# Patient Record
Sex: MALE | Race: WHITE | NOT HISPANIC OR LATINO | Employment: OTHER | URBAN - METROPOLITAN AREA
[De-identification: names, ages, dates, MRNs, and addresses within clinical notes are randomized per-mention and may not be internally consistent; named-entity substitution may affect disease eponyms.]

---

## 2021-03-26 ENCOUNTER — APPOINTMENT (OUTPATIENT)
Dept: GENERAL RADIOLOGY | Facility: HOSPITAL | Age: 81
End: 2021-03-26

## 2021-03-26 ENCOUNTER — APPOINTMENT (OUTPATIENT)
Dept: CT IMAGING | Facility: HOSPITAL | Age: 81
End: 2021-03-26

## 2021-03-26 ENCOUNTER — APPOINTMENT (OUTPATIENT)
Dept: MRI IMAGING | Facility: HOSPITAL | Age: 81
End: 2021-03-26

## 2021-03-26 ENCOUNTER — HOSPITAL ENCOUNTER (INPATIENT)
Facility: HOSPITAL | Age: 81
LOS: 11 days | Discharge: NURSING, REHAB OR LONG-TERM CARE | End: 2021-04-08
Attending: EMERGENCY MEDICINE | Admitting: INTERNAL MEDICINE

## 2021-03-26 DIAGNOSIS — R47.01 APHASIA: ICD-10-CM

## 2021-03-26 DIAGNOSIS — R47.1 DYSARTHRIA: ICD-10-CM

## 2021-03-26 DIAGNOSIS — G30.9 ALZHEIMER'S DEMENTIA WITHOUT BEHAVIORAL DISTURBANCE, UNSPECIFIED TIMING OF DEMENTIA ONSET: Primary | ICD-10-CM

## 2021-03-26 DIAGNOSIS — F02.80 ALZHEIMER'S DEMENTIA WITHOUT BEHAVIORAL DISTURBANCE, UNSPECIFIED TIMING OF DEMENTIA ONSET: Primary | ICD-10-CM

## 2021-03-26 DIAGNOSIS — R13.12 OROPHARYNGEAL DYSPHAGIA: ICD-10-CM

## 2021-03-26 DIAGNOSIS — R53.1 ACUTE LEFT-SIDED WEAKNESS: ICD-10-CM

## 2021-03-26 PROBLEM — E03.9 HYPOTHYROIDISM (ACQUIRED): Status: ACTIVE | Noted: 2021-03-26

## 2021-03-26 PROBLEM — K21.9 GERD WITHOUT ESOPHAGITIS: Status: ACTIVE | Noted: 2021-03-26

## 2021-03-26 PROBLEM — F03.90 DEMENTIA WITHOUT BEHAVIORAL DISTURBANCE (HCC): Status: ACTIVE | Noted: 2021-03-26

## 2021-03-26 LAB
ALT SERPL W P-5'-P-CCNC: 12 U/L (ref 1–41)
APTT PPP: 31.1 SECONDS (ref 24–37)
AST SERPL-CCNC: 20 U/L (ref 1–40)
BASE EXCESS BLDA CALC-SCNC: 6 MMOL/L (ref -5–5)
BASOPHILS # BLD AUTO: 0.04 10*3/MM3 (ref 0–0.2)
BASOPHILS NFR BLD AUTO: 0.5 % (ref 0–1.5)
CA-I BLDA-SCNC: 1.2 MMOL/L (ref 1.2–1.32)
CO2 BLDA-SCNC: 32 MMOL/L (ref 24–29)
CREAT BLDA-MCNC: 1.3 MG/DL (ref 0.6–1.3)
DEPRECATED RDW RBC AUTO: 44 FL (ref 37–54)
EOSINOPHIL # BLD AUTO: 0.09 10*3/MM3 (ref 0–0.4)
EOSINOPHIL NFR BLD AUTO: 1.1 % (ref 0.3–6.2)
ERYTHROCYTE [DISTWIDTH] IN BLOOD BY AUTOMATED COUNT: 13.2 % (ref 12.3–15.4)
FLUAV RNA RESP QL NAA+PROBE: NOT DETECTED
FLUBV RNA RESP QL NAA+PROBE: NOT DETECTED
GLUCOSE BLDC GLUCOMTR-MCNC: 113 MG/DL (ref 70–130)
HCO3 BLDA-SCNC: 30.7 MMOL/L (ref 22–26)
HCT VFR BLD AUTO: 45.2 % (ref 37.5–51)
HCT VFR BLDA CALC: 46 % (ref 38–51)
HGB BLD-MCNC: 14.4 G/DL (ref 13–17.7)
HGB BLDA-MCNC: 15.6 G/DL (ref 12–17)
HOLD SPECIMEN: NORMAL
IMM GRANULOCYTES # BLD AUTO: 0.02 10*3/MM3 (ref 0–0.05)
IMM GRANULOCYTES NFR BLD AUTO: 0.2 % (ref 0–0.5)
LYMPHOCYTES # BLD AUTO: 1.83 10*3/MM3 (ref 0.7–3.1)
LYMPHOCYTES NFR BLD AUTO: 22.6 % (ref 19.6–45.3)
MCH RBC QN AUTO: 28.9 PG (ref 26.6–33)
MCHC RBC AUTO-ENTMCNC: 31.9 G/DL (ref 31.5–35.7)
MCV RBC AUTO: 90.8 FL (ref 79–97)
MONOCYTES # BLD AUTO: 0.68 10*3/MM3 (ref 0.1–0.9)
MONOCYTES NFR BLD AUTO: 8.4 % (ref 5–12)
NEUTROPHILS NFR BLD AUTO: 5.45 10*3/MM3 (ref 1.7–7)
NEUTROPHILS NFR BLD AUTO: 67.2 % (ref 42.7–76)
NRBC BLD AUTO-RTO: 0 /100 WBC (ref 0–0.2)
PCO2 BLDA: 49.8 MM HG (ref 35–45)
PH BLDA: 7.4 PH UNITS (ref 7.35–7.6)
PLATELET # BLD AUTO: 219 10*3/MM3 (ref 140–450)
PMV BLD AUTO: 10.1 FL (ref 6–12)
PO2 BLDA: 13 MMHG (ref 80–105)
POTASSIUM BLDA-SCNC: 3.9 MMOL/L (ref 3.5–4.9)
QT INTERVAL: 432 MS
QTC INTERVAL: 466 MS
RBC # BLD AUTO: 4.98 10*6/MM3 (ref 4.14–5.8)
SAO2 % BLDA: 15 % (ref 95–98)
SARS-COV-2 RNA RESP QL NAA+PROBE: NOT DETECTED
SODIUM BLD-SCNC: 145 MMOL/L (ref 138–146)
TROPONIN T SERPL-MCNC: <0.01 NG/ML (ref 0–0.03)
WBC # BLD AUTO: 8.11 10*3/MM3 (ref 3.4–10.8)
WHOLE BLOOD HOLD SPECIMEN: NORMAL
WHOLE BLOOD HOLD SPECIMEN: NORMAL

## 2021-03-26 PROCEDURE — 70450 CT HEAD/BRAIN W/O DYE: CPT

## 2021-03-26 PROCEDURE — 99223 1ST HOSP IP/OBS HIGH 75: CPT | Performed by: NURSE PRACTITIONER

## 2021-03-26 PROCEDURE — 82947 ASSAY GLUCOSE BLOOD QUANT: CPT

## 2021-03-26 PROCEDURE — 70496 CT ANGIOGRAPHY HEAD: CPT

## 2021-03-26 PROCEDURE — 0042T HC CT CEREBRAL PERFUSION W/WO CONTRAST: CPT

## 2021-03-26 PROCEDURE — 82330 ASSAY OF CALCIUM: CPT

## 2021-03-26 PROCEDURE — 84450 TRANSFERASE (AST) (SGOT): CPT | Performed by: EMERGENCY MEDICINE

## 2021-03-26 PROCEDURE — 85730 THROMBOPLASTIN TIME PARTIAL: CPT | Performed by: EMERGENCY MEDICINE

## 2021-03-26 PROCEDURE — G0378 HOSPITAL OBSERVATION PER HR: HCPCS

## 2021-03-26 PROCEDURE — 85025 COMPLETE CBC W/AUTO DIFF WBC: CPT | Performed by: EMERGENCY MEDICINE

## 2021-03-26 PROCEDURE — 84460 ALANINE AMINO (ALT) (SGPT): CPT | Performed by: EMERGENCY MEDICINE

## 2021-03-26 PROCEDURE — 70498 CT ANGIOGRAPHY NECK: CPT

## 2021-03-26 PROCEDURE — 84484 ASSAY OF TROPONIN QUANT: CPT | Performed by: EMERGENCY MEDICINE

## 2021-03-26 PROCEDURE — 87636 SARSCOV2 & INF A&B AMP PRB: CPT | Performed by: INTERNAL MEDICINE

## 2021-03-26 PROCEDURE — 82803 BLOOD GASES ANY COMBINATION: CPT

## 2021-03-26 PROCEDURE — 70551 MRI BRAIN STEM W/O DYE: CPT

## 2021-03-26 PROCEDURE — 84132 ASSAY OF SERUM POTASSIUM: CPT

## 2021-03-26 PROCEDURE — 85014 HEMATOCRIT: CPT

## 2021-03-26 PROCEDURE — 71045 X-RAY EXAM CHEST 1 VIEW: CPT

## 2021-03-26 PROCEDURE — 99285 EMERGENCY DEPT VISIT HI MDM: CPT

## 2021-03-26 PROCEDURE — 99220 PR INITIAL OBSERVATION CARE/DAY 70 MINUTES: CPT | Performed by: INTERNAL MEDICINE

## 2021-03-26 PROCEDURE — 84295 ASSAY OF SERUM SODIUM: CPT

## 2021-03-26 PROCEDURE — 82565 ASSAY OF CREATININE: CPT

## 2021-03-26 PROCEDURE — 93005 ELECTROCARDIOGRAM TRACING: CPT | Performed by: EMERGENCY MEDICINE

## 2021-03-26 PROCEDURE — 0 IOPAMIDOL PER 1 ML: Performed by: EMERGENCY MEDICINE

## 2021-03-26 RX ORDER — ASPIRIN 300 MG/1
300 SUPPOSITORY RECTAL DAILY
Status: DISCONTINUED | OUTPATIENT
Start: 2021-03-26 | End: 2021-04-08 | Stop reason: HOSPADM

## 2021-03-26 RX ORDER — SODIUM CHLORIDE 0.9 % (FLUSH) 0.9 %
10 SYRINGE (ML) INJECTION EVERY 12 HOURS SCHEDULED
Status: DISCONTINUED | OUTPATIENT
Start: 2021-03-26 | End: 2021-04-08 | Stop reason: HOSPADM

## 2021-03-26 RX ORDER — LEVOTHYROXINE SODIUM 0.07 MG/1
75 TABLET ORAL DAILY
COMMUNITY

## 2021-03-26 RX ORDER — BISACODYL 5 MG/1
10 TABLET, DELAYED RELEASE ORAL DAILY PRN
COMMUNITY

## 2021-03-26 RX ORDER — MELATONIN
1000 DAILY
COMMUNITY

## 2021-03-26 RX ORDER — CLOPIDOGREL BISULFATE 75 MG/1
75 TABLET ORAL DAILY
Status: DISCONTINUED | OUTPATIENT
Start: 2021-03-26 | End: 2021-04-08 | Stop reason: HOSPADM

## 2021-03-26 RX ORDER — CLONAZEPAM 0.5 MG/1
0.5 TABLET ORAL 2 TIMES DAILY PRN
Status: ON HOLD | COMMUNITY
End: 2021-04-08 | Stop reason: SDUPTHER

## 2021-03-26 RX ORDER — LEVOTHYROXINE SODIUM 0.07 MG/1
75 TABLET ORAL
Status: DISCONTINUED | OUTPATIENT
Start: 2021-03-27 | End: 2021-04-08 | Stop reason: HOSPADM

## 2021-03-26 RX ORDER — FLUDROCORTISONE ACETATE 0.1 MG/1
TABLET ORAL DAILY
COMMUNITY

## 2021-03-26 RX ORDER — PANTOPRAZOLE SODIUM 40 MG/1
40 TABLET, DELAYED RELEASE ORAL DAILY
COMMUNITY

## 2021-03-26 RX ORDER — CLONAZEPAM 1 MG/1
1 TABLET ORAL NIGHTLY
Status: DISPENSED | OUTPATIENT
Start: 2021-03-26 | End: 2021-04-02

## 2021-03-26 RX ORDER — ATORVASTATIN CALCIUM 80 MG/1
80 TABLET, FILM COATED ORAL DAILY
COMMUNITY

## 2021-03-26 RX ORDER — SODIUM CHLORIDE 0.9 % (FLUSH) 0.9 %
10 SYRINGE (ML) INJECTION AS NEEDED
Status: DISCONTINUED | OUTPATIENT
Start: 2021-03-26 | End: 2021-04-08 | Stop reason: HOSPADM

## 2021-03-26 RX ORDER — ASPIRIN 81 MG/1
81 TABLET ORAL DAILY
COMMUNITY
End: 2021-04-08 | Stop reason: HOSPADM

## 2021-03-26 RX ORDER — FLUDROCORTISONE ACETATE 0.1 MG/1
100 TABLET ORAL DAILY
Status: DISCONTINUED | OUTPATIENT
Start: 2021-03-27 | End: 2021-04-08 | Stop reason: HOSPADM

## 2021-03-26 RX ORDER — ASPIRIN 325 MG
325 TABLET ORAL DAILY
Status: DISCONTINUED | OUTPATIENT
Start: 2021-03-26 | End: 2021-04-08 | Stop reason: HOSPADM

## 2021-03-26 RX ORDER — LANOLIN ALCOHOL/MO/W.PET/CERES
1000 CREAM (GRAM) TOPICAL DAILY
COMMUNITY

## 2021-03-26 RX ORDER — CLONAZEPAM 1 MG/1
1 TABLET ORAL 2 TIMES DAILY PRN
COMMUNITY
End: 2021-04-08 | Stop reason: HOSPADM

## 2021-03-26 RX ORDER — CLOPIDOGREL BISULFATE 75 MG/1
75 TABLET ORAL DAILY
COMMUNITY

## 2021-03-26 RX ORDER — LORAZEPAM 2 MG/ML
0.25 INJECTION INTRAMUSCULAR NIGHTLY PRN
Status: COMPLETED | OUTPATIENT
Start: 2021-03-26 | End: 2021-03-27

## 2021-03-26 RX ORDER — ACETAMINOPHEN 325 MG/1
650 TABLET ORAL EVERY 6 HOURS PRN
COMMUNITY

## 2021-03-26 RX ORDER — CLONAZEPAM 0.5 MG/1
0.5 TABLET ORAL DAILY PRN
Status: DISCONTINUED | OUTPATIENT
Start: 2021-03-26 | End: 2021-04-08 | Stop reason: HOSPADM

## 2021-03-26 RX ORDER — PANTOPRAZOLE SODIUM 40 MG/1
40 TABLET, DELAYED RELEASE ORAL
Status: DISCONTINUED | OUTPATIENT
Start: 2021-03-27 | End: 2021-04-08 | Stop reason: HOSPADM

## 2021-03-26 RX ORDER — ATORVASTATIN CALCIUM 40 MG/1
80 TABLET, FILM COATED ORAL NIGHTLY
Status: DISCONTINUED | OUTPATIENT
Start: 2021-03-26 | End: 2021-04-08 | Stop reason: HOSPADM

## 2021-03-26 RX ADMIN — IOPAMIDOL 100 ML: 755 INJECTION, SOLUTION INTRAVENOUS at 15:00

## 2021-03-26 RX ADMIN — SODIUM CHLORIDE, PRESERVATIVE FREE 10 ML: 5 INJECTION INTRAVENOUS at 20:13

## 2021-03-27 ENCOUNTER — APPOINTMENT (OUTPATIENT)
Dept: CARDIOLOGY | Facility: HOSPITAL | Age: 81
End: 2021-03-27

## 2021-03-27 LAB
ANION GAP SERPL CALCULATED.3IONS-SCNC: 10 MMOL/L (ref 5–15)
BACTERIA UR QL AUTO: ABNORMAL /HPF
BILIRUB UR QL STRIP: NEGATIVE
BUN SERPL-MCNC: 11 MG/DL (ref 8–23)
BUN/CREAT SERPL: 8.9 (ref 7–25)
CALCIUM SPEC-SCNC: 8.5 MG/DL (ref 8.6–10.5)
CHLORIDE SERPL-SCNC: 106 MMOL/L (ref 98–107)
CHOLEST SERPL-MCNC: 142 MG/DL (ref 0–200)
CLARITY UR: CLEAR
CO2 SERPL-SCNC: 29 MMOL/L (ref 22–29)
COLOR UR: YELLOW
CREAT SERPL-MCNC: 1.24 MG/DL (ref 0.76–1.27)
DEPRECATED RDW RBC AUTO: 43.8 FL (ref 37–54)
ERYTHROCYTE [DISTWIDTH] IN BLOOD BY AUTOMATED COUNT: 13.1 % (ref 12.3–15.4)
GFR SERPL CREATININE-BSD FRML MDRD: 56 ML/MIN/1.73
GLUCOSE BLDC GLUCOMTR-MCNC: 120 MG/DL (ref 70–130)
GLUCOSE BLDC GLUCOMTR-MCNC: 94 MG/DL (ref 70–130)
GLUCOSE SERPL-MCNC: 87 MG/DL (ref 65–99)
GLUCOSE UR STRIP-MCNC: NEGATIVE MG/DL
HBA1C MFR BLD: 5.4 % (ref 4.8–5.6)
HCT VFR BLD AUTO: 44 % (ref 37.5–51)
HDLC SERPL-MCNC: 37 MG/DL (ref 40–60)
HGB BLD-MCNC: 13.9 G/DL (ref 13–17.7)
HGB UR QL STRIP.AUTO: ABNORMAL
HYALINE CASTS UR QL AUTO: ABNORMAL /LPF
KETONES UR QL STRIP: ABNORMAL
LDLC SERPL CALC-MCNC: 85 MG/DL (ref 0–100)
LDLC/HDLC SERPL: 2.25 {RATIO}
LEUKOCYTE ESTERASE UR QL STRIP.AUTO: NEGATIVE
MCH RBC QN AUTO: 28.7 PG (ref 26.6–33)
MCHC RBC AUTO-ENTMCNC: 31.6 G/DL (ref 31.5–35.7)
MCV RBC AUTO: 90.9 FL (ref 79–97)
NITRITE UR QL STRIP: NEGATIVE
PH UR STRIP.AUTO: 5.5 [PH] (ref 5–8)
PLATELET # BLD AUTO: 207 10*3/MM3 (ref 140–450)
PMV BLD AUTO: 10.5 FL (ref 6–12)
POTASSIUM SERPL-SCNC: 3.8 MMOL/L (ref 3.5–5.2)
PROT UR QL STRIP: ABNORMAL
RBC # BLD AUTO: 4.84 10*6/MM3 (ref 4.14–5.8)
RBC # UR: ABNORMAL /HPF
REF LAB TEST METHOD: ABNORMAL
SODIUM SERPL-SCNC: 145 MMOL/L (ref 136–145)
SP GR UR STRIP: 1.04 (ref 1–1.03)
SQUAMOUS #/AREA URNS HPF: ABNORMAL /HPF
TRIGL SERPL-MCNC: 109 MG/DL (ref 0–150)
TSH SERPL DL<=0.05 MIU/L-ACNC: 3.57 UIU/ML (ref 0.27–4.2)
UROBILINOGEN UR QL STRIP: ABNORMAL
VLDLC SERPL-MCNC: 20 MG/DL (ref 5–40)
WBC # BLD AUTO: 7.67 10*3/MM3 (ref 3.4–10.8)
WBC UR QL AUTO: ABNORMAL /HPF

## 2021-03-27 PROCEDURE — 99226 PR SBSQ OBSERVATION CARE/DAY 35 MINUTES: CPT | Performed by: FAMILY MEDICINE

## 2021-03-27 PROCEDURE — G0378 HOSPITAL OBSERVATION PER HR: HCPCS

## 2021-03-27 PROCEDURE — 92610 EVALUATE SWALLOWING FUNCTION: CPT

## 2021-03-27 PROCEDURE — 80061 LIPID PANEL: CPT | Performed by: NURSE PRACTITIONER

## 2021-03-27 PROCEDURE — 25010000002 LORAZEPAM PER 2 MG: Performed by: INTERNAL MEDICINE

## 2021-03-27 PROCEDURE — 82962 GLUCOSE BLOOD TEST: CPT

## 2021-03-27 PROCEDURE — 85027 COMPLETE CBC AUTOMATED: CPT | Performed by: INTERNAL MEDICINE

## 2021-03-27 PROCEDURE — 99223 1ST HOSP IP/OBS HIGH 75: CPT | Performed by: PSYCHIATRY & NEUROLOGY

## 2021-03-27 PROCEDURE — 83036 HEMOGLOBIN GLYCOSYLATED A1C: CPT | Performed by: NURSE PRACTITIONER

## 2021-03-27 PROCEDURE — 84443 ASSAY THYROID STIM HORMONE: CPT | Performed by: INTERNAL MEDICINE

## 2021-03-27 PROCEDURE — 93306 TTE W/DOPPLER COMPLETE: CPT | Performed by: INTERNAL MEDICINE

## 2021-03-27 PROCEDURE — 92523 SPEECH SOUND LANG COMPREHEN: CPT

## 2021-03-27 PROCEDURE — 80048 BASIC METABOLIC PNL TOTAL CA: CPT | Performed by: INTERNAL MEDICINE

## 2021-03-27 PROCEDURE — 93306 TTE W/DOPPLER COMPLETE: CPT

## 2021-03-27 PROCEDURE — 81001 URINALYSIS AUTO W/SCOPE: CPT | Performed by: INTERNAL MEDICINE

## 2021-03-27 RX ORDER — DONEPEZIL HYDROCHLORIDE 10 MG/1
5 TABLET, FILM COATED ORAL NIGHTLY
Status: DISCONTINUED | OUTPATIENT
Start: 2021-03-27 | End: 2021-04-08 | Stop reason: HOSPADM

## 2021-03-27 RX ADMIN — DONEPEZIL HYDROCHLORIDE 5 MG: 10 TABLET, FILM COATED ORAL at 21:08

## 2021-03-27 RX ADMIN — ATORVASTATIN CALCIUM 80 MG: 40 TABLET, FILM COATED ORAL at 21:08

## 2021-03-27 RX ADMIN — CLONAZEPAM 1 MG: 1 TABLET ORAL at 21:08

## 2021-03-27 RX ADMIN — CLOPIDOGREL BISULFATE 75 MG: 75 TABLET ORAL at 11:37

## 2021-03-27 RX ADMIN — FLUDROCORTISONE ACETATE 100 MCG: 0.1 TABLET ORAL at 11:37

## 2021-03-27 RX ADMIN — ASPIRIN 325 MG ORAL TABLET 325 MG: 325 PILL ORAL at 11:37

## 2021-03-27 RX ADMIN — LORAZEPAM 0.25 MG: 2 INJECTION INTRAMUSCULAR; INTRAVENOUS at 01:35

## 2021-03-27 RX ADMIN — SERTRALINE HYDROCHLORIDE 50 MG: 50 TABLET ORAL at 11:38

## 2021-03-27 RX ADMIN — SODIUM CHLORIDE, PRESERVATIVE FREE 10 ML: 5 INJECTION INTRAVENOUS at 11:38

## 2021-03-27 RX ADMIN — SODIUM CHLORIDE, PRESERVATIVE FREE 10 ML: 5 INJECTION INTRAVENOUS at 21:09

## 2021-03-28 LAB
ANION GAP SERPL CALCULATED.3IONS-SCNC: 10 MMOL/L (ref 5–15)
BH CV ECHO MEAS - AO MAX PG (FULL): 3.3 MMHG
BH CV ECHO MEAS - AO MAX PG: 5.7 MMHG
BH CV ECHO MEAS - AO MEAN PG (FULL): 2.1 MMHG
BH CV ECHO MEAS - AO MEAN PG: 3.3 MMHG
BH CV ECHO MEAS - AO ROOT AREA (BSA CORRECTED): 2.1
BH CV ECHO MEAS - AO ROOT AREA: 10.6 CM^2
BH CV ECHO MEAS - AO ROOT DIAM: 3.7 CM
BH CV ECHO MEAS - AO V2 MAX: 119.3 CM/SEC
BH CV ECHO MEAS - AO V2 MEAN: 85.3 CM/SEC
BH CV ECHO MEAS - AO V2 VTI: 30.9 CM
BH CV ECHO MEAS - ASC AORTA: 3.1 CM
BH CV ECHO MEAS - AVA(I,A): 1.9 CM^2
BH CV ECHO MEAS - AVA(I,D): 1.9 CM^2
BH CV ECHO MEAS - AVA(V,A): 2 CM^2
BH CV ECHO MEAS - AVA(V,D): 2 CM^2
BH CV ECHO MEAS - BSA(HAYCOCK): 1.7 M^2
BH CV ECHO MEAS - BSA: 1.8 M^2
BH CV ECHO MEAS - BZI_BMI: 20.2 KILOGRAMS/M^2
BH CV ECHO MEAS - BZI_METRIC_HEIGHT: 175.3 CM
BH CV ECHO MEAS - BZI_METRIC_WEIGHT: 62.1 KG
BH CV ECHO MEAS - EDV(CUBED): 176.4 ML
BH CV ECHO MEAS - EDV(MOD-SP4): 143 ML
BH CV ECHO MEAS - EDV(TEICH): 154.2 ML
BH CV ECHO MEAS - EF(CUBED): 46.6 %
BH CV ECHO MEAS - EF(MOD-SP4): 33.6 %
BH CV ECHO MEAS - EF(TEICH): 38.5 %
BH CV ECHO MEAS - ESV(CUBED): 94.2 ML
BH CV ECHO MEAS - ESV(MOD-SP4): 95 ML
BH CV ECHO MEAS - ESV(TEICH): 94.8 ML
BH CV ECHO MEAS - FS: 18.9 %
BH CV ECHO MEAS - IVS/LVPW: 0.81
BH CV ECHO MEAS - IVSD: 1 CM
BH CV ECHO MEAS - LA DIMENSION: 3 CM
BH CV ECHO MEAS - LA/AO: 0.81
BH CV ECHO MEAS - LAD MAJOR: 4.6 CM
BH CV ECHO MEAS - LAT PEAK E' VEL: 3.9 CM/SEC
BH CV ECHO MEAS - LATERAL E/E' RATIO: 13.5
BH CV ECHO MEAS - LV DIASTOLIC VOL/BSA (35-75): 81.3 ML/M^2
BH CV ECHO MEAS - LV MASS(C)D: 231.3 GRAMS
BH CV ECHO MEAS - LV MASS(C)DI: 131.5 GRAMS/M^2
BH CV ECHO MEAS - LV MAX PG: 2.4 MMHG
BH CV ECHO MEAS - LV MEAN PG: 1.3 MMHG
BH CV ECHO MEAS - LV SYSTOLIC VOL/BSA (12-30): 54 ML/M^2
BH CV ECHO MEAS - LV V1 MAX: 77.5 CM/SEC
BH CV ECHO MEAS - LV V1 MEAN: 52.4 CM/SEC
BH CV ECHO MEAS - LV V1 VTI: 18.8 CM
BH CV ECHO MEAS - LVIDD: 5.6 CM
BH CV ECHO MEAS - LVIDS: 4.5 CM
BH CV ECHO MEAS - LVLD AP4: 7.5 CM
BH CV ECHO MEAS - LVLS AP4: 7.2 CM
BH CV ECHO MEAS - LVOT AREA (M): 3.1 CM^2
BH CV ECHO MEAS - LVOT AREA: 3.1 CM^2
BH CV ECHO MEAS - LVOT DIAM: 2 CM
BH CV ECHO MEAS - LVPWD: 1.1 CM
BH CV ECHO MEAS - MED PEAK E' VEL: 3.3 CM/SEC
BH CV ECHO MEAS - MEDIAL E/E' RATIO: 15.7
BH CV ECHO MEAS - MV A MAX VEL: 76.5 CM/SEC
BH CV ECHO MEAS - MV DEC TIME: 0.29 SEC
BH CV ECHO MEAS - MV E MAX VEL: 54.3 CM/SEC
BH CV ECHO MEAS - MV E/A: 0.71
BH CV ECHO MEAS - MV MAX PG: 2.6 MMHG
BH CV ECHO MEAS - MV MEAN PG: 1 MMHG
BH CV ECHO MEAS - MV V2 MAX: 80 CM/SEC
BH CV ECHO MEAS - MV V2 MEAN: 47.9 CM/SEC
BH CV ECHO MEAS - MV V2 VTI: 39.6 CM
BH CV ECHO MEAS - MVA(VTI): 1.5 CM^2
BH CV ECHO MEAS - PA MAX PG: 3 MMHG
BH CV ECHO MEAS - PA V2 MAX: 86.9 CM/SEC
BH CV ECHO MEAS - SI(AO): 186.7 ML/M^2
BH CV ECHO MEAS - SI(CUBED): 46.7 ML/M^2
BH CV ECHO MEAS - SI(LVOT): 32.7 ML/M^2
BH CV ECHO MEAS - SI(MOD-SP4): 27.3 ML/M^2
BH CV ECHO MEAS - SI(TEICH): 33.7 ML/M^2
BH CV ECHO MEAS - SV(AO): 328.4 ML
BH CV ECHO MEAS - SV(CUBED): 82.2 ML
BH CV ECHO MEAS - SV(LVOT): 57.6 ML
BH CV ECHO MEAS - SV(MOD-SP4): 48 ML
BH CV ECHO MEAS - SV(TEICH): 59.3 ML
BH CV ECHO MEAS - TAPSE (>1.6): 2.5 CM
BH CV ECHO MEAS - TR MAX PG: 12 MMHG
BH CV ECHO MEAS - TR MAX VEL: 172.7 CM/SEC
BH CV ECHO MEAS - TV MAX PG: 0.46 MMHG
BH CV ECHO MEAS - TV V2 MAX: 34.1 CM/SEC
BH CV ECHO MEASUREMENTS AVERAGE E/E' RATIO: 15.08
BH CV VAS BP RIGHT ARM: NORMAL MMHG
BH CV XLRA - RV BASE: 2.8 CM
BH CV XLRA - RV LENGTH: 7.1 CM
BH CV XLRA - RV MID: 2.9 CM
BH CV XLRA - TDI S': 11.4 CM/SEC
BUN SERPL-MCNC: 11 MG/DL (ref 8–23)
BUN/CREAT SERPL: 10.1 (ref 7–25)
CALCIUM SPEC-SCNC: 8.2 MG/DL (ref 8.6–10.5)
CHLORIDE SERPL-SCNC: 104 MMOL/L (ref 98–107)
CO2 SERPL-SCNC: 27 MMOL/L (ref 22–29)
CREAT SERPL-MCNC: 1.09 MG/DL (ref 0.76–1.27)
DEPRECATED RDW RBC AUTO: 43 FL (ref 37–54)
ERYTHROCYTE [DISTWIDTH] IN BLOOD BY AUTOMATED COUNT: 13.2 % (ref 12.3–15.4)
GFR SERPL CREATININE-BSD FRML MDRD: 65 ML/MIN/1.73
GLUCOSE SERPL-MCNC: 81 MG/DL (ref 65–99)
HCT VFR BLD AUTO: 42 % (ref 37.5–51)
HGB BLD-MCNC: 13.4 G/DL (ref 13–17.7)
LEFT ATRIUM VOLUME INDEX: 21 ML/M^2
LEFT ATRIUM VOLUME: 37 ML
LV EF 2D ECHO EST: 35 %
MAGNESIUM SERPL-MCNC: 2 MG/DL (ref 1.6–2.4)
MAXIMAL PREDICTED HEART RATE: 138 BPM
MCH RBC QN AUTO: 28.3 PG (ref 26.6–33)
MCHC RBC AUTO-ENTMCNC: 31.9 G/DL (ref 31.5–35.7)
MCV RBC AUTO: 88.6 FL (ref 79–97)
PLATELET # BLD AUTO: 202 10*3/MM3 (ref 140–450)
PMV BLD AUTO: 10.8 FL (ref 6–12)
POTASSIUM SERPL-SCNC: 3.1 MMOL/L (ref 3.5–5.2)
POTASSIUM SERPL-SCNC: 3.5 MMOL/L (ref 3.5–5.2)
RBC # BLD AUTO: 4.74 10*6/MM3 (ref 4.14–5.8)
SODIUM SERPL-SCNC: 141 MMOL/L (ref 136–145)
STRESS TARGET HR: 117 BPM
WBC # BLD AUTO: 6.98 10*3/MM3 (ref 3.4–10.8)

## 2021-03-28 PROCEDURE — 97166 OT EVAL MOD COMPLEX 45 MIN: CPT

## 2021-03-28 PROCEDURE — 97162 PT EVAL MOD COMPLEX 30 MIN: CPT

## 2021-03-28 PROCEDURE — 84132 ASSAY OF SERUM POTASSIUM: CPT | Performed by: FAMILY MEDICINE

## 2021-03-28 PROCEDURE — 80048 BASIC METABOLIC PNL TOTAL CA: CPT | Performed by: FAMILY MEDICINE

## 2021-03-28 PROCEDURE — 99232 SBSQ HOSP IP/OBS MODERATE 35: CPT | Performed by: FAMILY MEDICINE

## 2021-03-28 PROCEDURE — 85027 COMPLETE CBC AUTOMATED: CPT | Performed by: FAMILY MEDICINE

## 2021-03-28 PROCEDURE — 99231 SBSQ HOSP IP/OBS SF/LOW 25: CPT | Performed by: PSYCHIATRY & NEUROLOGY

## 2021-03-28 PROCEDURE — 83735 ASSAY OF MAGNESIUM: CPT | Performed by: FAMILY MEDICINE

## 2021-03-28 RX ORDER — POTASSIUM CHLORIDE 750 MG/1
40 CAPSULE, EXTENDED RELEASE ORAL AS NEEDED
Status: DISCONTINUED | OUTPATIENT
Start: 2021-03-28 | End: 2021-04-08 | Stop reason: HOSPADM

## 2021-03-28 RX ORDER — POTASSIUM CHLORIDE 1.5 G/1.77G
40 POWDER, FOR SOLUTION ORAL AS NEEDED
Status: DISCONTINUED | OUTPATIENT
Start: 2021-03-28 | End: 2021-04-08 | Stop reason: HOSPADM

## 2021-03-28 RX ORDER — MAGNESIUM SULFATE HEPTAHYDRATE 40 MG/ML
2 INJECTION, SOLUTION INTRAVENOUS AS NEEDED
Status: DISCONTINUED | OUTPATIENT
Start: 2021-03-28 | End: 2021-04-08 | Stop reason: HOSPADM

## 2021-03-28 RX ORDER — MAGNESIUM SULFATE HEPTAHYDRATE 40 MG/ML
4 INJECTION, SOLUTION INTRAVENOUS AS NEEDED
Status: DISCONTINUED | OUTPATIENT
Start: 2021-03-28 | End: 2021-04-08 | Stop reason: HOSPADM

## 2021-03-28 RX ORDER — POTASSIUM CHLORIDE 7.45 MG/ML
10 INJECTION INTRAVENOUS
Status: DISCONTINUED | OUTPATIENT
Start: 2021-03-28 | End: 2021-04-08 | Stop reason: HOSPADM

## 2021-03-28 RX ADMIN — SERTRALINE HYDROCHLORIDE 50 MG: 50 TABLET ORAL at 10:40

## 2021-03-28 RX ADMIN — ATORVASTATIN CALCIUM 80 MG: 40 TABLET, FILM COATED ORAL at 20:56

## 2021-03-28 RX ADMIN — SODIUM CHLORIDE, PRESERVATIVE FREE 10 ML: 5 INJECTION INTRAVENOUS at 20:56

## 2021-03-28 RX ADMIN — POTASSIUM CHLORIDE 40 MEQ: 750 CAPSULE, EXTENDED RELEASE ORAL at 20:57

## 2021-03-28 RX ADMIN — DONEPEZIL HYDROCHLORIDE 5 MG: 10 TABLET, FILM COATED ORAL at 20:57

## 2021-03-28 RX ADMIN — LEVOTHYROXINE SODIUM 75 MCG: 75 TABLET ORAL at 05:16

## 2021-03-28 RX ADMIN — ASPIRIN 325 MG ORAL TABLET 325 MG: 325 PILL ORAL at 10:39

## 2021-03-28 RX ADMIN — FLUDROCORTISONE ACETATE 100 MCG: 0.1 TABLET ORAL at 10:40

## 2021-03-28 RX ADMIN — SODIUM CHLORIDE, PRESERVATIVE FREE 10 ML: 5 INJECTION INTRAVENOUS at 10:40

## 2021-03-28 RX ADMIN — POTASSIUM CHLORIDE 40 MEQ: 750 CAPSULE, EXTENDED RELEASE ORAL at 17:07

## 2021-03-28 RX ADMIN — PANTOPRAZOLE SODIUM 40 MG: 40 TABLET, DELAYED RELEASE ORAL at 05:15

## 2021-03-28 RX ADMIN — CLONAZEPAM 1 MG: 1 TABLET ORAL at 20:57

## 2021-03-28 RX ADMIN — CLOPIDOGREL BISULFATE 75 MG: 75 TABLET ORAL at 10:39

## 2021-03-29 PROBLEM — E78.5 DYSLIPIDEMIA: Status: ACTIVE | Noted: 2021-03-29

## 2021-03-29 PROBLEM — I42.9 CARDIOMYOPATHY (HCC): Status: ACTIVE | Noted: 2021-03-29

## 2021-03-29 PROBLEM — I10 ESSENTIAL HYPERTENSION: Status: ACTIVE | Noted: 2021-03-29

## 2021-03-29 LAB
ANION GAP SERPL CALCULATED.3IONS-SCNC: 7 MMOL/L (ref 5–15)
BUN SERPL-MCNC: 11 MG/DL (ref 8–23)
BUN/CREAT SERPL: 9 (ref 7–25)
CALCIUM SPEC-SCNC: 8.8 MG/DL (ref 8.6–10.5)
CHLORIDE SERPL-SCNC: 109 MMOL/L (ref 98–107)
CO2 SERPL-SCNC: 29 MMOL/L (ref 22–29)
CREAT SERPL-MCNC: 1.22 MG/DL (ref 0.76–1.27)
GFR SERPL CREATININE-BSD FRML MDRD: 57 ML/MIN/1.73
GLUCOSE SERPL-MCNC: 99 MG/DL (ref 65–99)
PA ADP PRP-ACNC: 107 PRU
POTASSIUM SERPL-SCNC: 3.8 MMOL/L (ref 3.5–5.2)
SODIUM SERPL-SCNC: 145 MMOL/L (ref 136–145)

## 2021-03-29 PROCEDURE — 92526 ORAL FUNCTION THERAPY: CPT

## 2021-03-29 PROCEDURE — 85576 BLOOD PLATELET AGGREGATION: CPT | Performed by: NURSE PRACTITIONER

## 2021-03-29 PROCEDURE — 97530 THERAPEUTIC ACTIVITIES: CPT

## 2021-03-29 PROCEDURE — 97110 THERAPEUTIC EXERCISES: CPT

## 2021-03-29 PROCEDURE — 80048 BASIC METABOLIC PNL TOTAL CA: CPT | Performed by: FAMILY MEDICINE

## 2021-03-29 PROCEDURE — 99232 SBSQ HOSP IP/OBS MODERATE 35: CPT | Performed by: FAMILY MEDICINE

## 2021-03-29 PROCEDURE — 99233 SBSQ HOSP IP/OBS HIGH 50: CPT | Performed by: STUDENT IN AN ORGANIZED HEALTH CARE EDUCATION/TRAINING PROGRAM

## 2021-03-29 PROCEDURE — 99222 1ST HOSP IP/OBS MODERATE 55: CPT | Performed by: INTERNAL MEDICINE

## 2021-03-29 PROCEDURE — 92507 TX SP LANG VOICE COMM INDIV: CPT

## 2021-03-29 RX ORDER — CARVEDILOL 3.12 MG/1
3.12 TABLET ORAL 2 TIMES DAILY WITH MEALS
Status: DISCONTINUED | OUTPATIENT
Start: 2021-03-29 | End: 2021-04-08 | Stop reason: HOSPADM

## 2021-03-29 RX ADMIN — FLUDROCORTISONE ACETATE 100 MCG: 0.1 TABLET ORAL at 10:04

## 2021-03-29 RX ADMIN — ASPIRIN 325 MG ORAL TABLET 325 MG: 325 PILL ORAL at 10:04

## 2021-03-29 RX ADMIN — PANTOPRAZOLE SODIUM 40 MG: 40 TABLET, DELAYED RELEASE ORAL at 05:09

## 2021-03-29 RX ADMIN — LEVOTHYROXINE SODIUM 75 MCG: 75 TABLET ORAL at 05:09

## 2021-03-29 RX ADMIN — DONEPEZIL HYDROCHLORIDE 5 MG: 10 TABLET, FILM COATED ORAL at 22:38

## 2021-03-29 RX ADMIN — SERTRALINE HYDROCHLORIDE 50 MG: 50 TABLET ORAL at 10:04

## 2021-03-29 RX ADMIN — SODIUM CHLORIDE, PRESERVATIVE FREE 10 ML: 5 INJECTION INTRAVENOUS at 22:39

## 2021-03-29 RX ADMIN — SODIUM CHLORIDE, PRESERVATIVE FREE 10 ML: 5 INJECTION INTRAVENOUS at 10:04

## 2021-03-29 RX ADMIN — CARVEDILOL 3.12 MG: 3.12 TABLET, FILM COATED ORAL at 17:03

## 2021-03-29 RX ADMIN — CLONAZEPAM 0.5 MG: 0.5 TABLET ORAL at 13:49

## 2021-03-29 RX ADMIN — CLONAZEPAM 1 MG: 1 TABLET ORAL at 22:39

## 2021-03-29 RX ADMIN — CLOPIDOGREL BISULFATE 75 MG: 75 TABLET ORAL at 10:04

## 2021-03-29 RX ADMIN — ATORVASTATIN CALCIUM 80 MG: 40 TABLET, FILM COATED ORAL at 22:38

## 2021-03-30 ENCOUNTER — APPOINTMENT (OUTPATIENT)
Dept: GENERAL RADIOLOGY | Facility: HOSPITAL | Age: 81
End: 2021-03-30

## 2021-03-30 PROCEDURE — 99231 SBSQ HOSP IP/OBS SF/LOW 25: CPT | Performed by: NURSE PRACTITIONER

## 2021-03-30 PROCEDURE — 74230 X-RAY XM SWLNG FUNCJ C+: CPT

## 2021-03-30 PROCEDURE — 99232 SBSQ HOSP IP/OBS MODERATE 35: CPT | Performed by: FAMILY MEDICINE

## 2021-03-30 PROCEDURE — 92507 TX SP LANG VOICE COMM INDIV: CPT

## 2021-03-30 PROCEDURE — 97535 SELF CARE MNGMENT TRAINING: CPT

## 2021-03-30 PROCEDURE — 97530 THERAPEUTIC ACTIVITIES: CPT

## 2021-03-30 PROCEDURE — 92611 MOTION FLUOROSCOPY/SWALLOW: CPT

## 2021-03-30 RX ADMIN — BARIUM SULFATE 100 ML: 0.81 POWDER, FOR SUSPENSION ORAL at 11:55

## 2021-03-30 RX ADMIN — PANTOPRAZOLE SODIUM 40 MG: 40 TABLET, DELAYED RELEASE ORAL at 05:26

## 2021-03-30 RX ADMIN — CARVEDILOL 3.12 MG: 3.12 TABLET, FILM COATED ORAL at 10:09

## 2021-03-30 RX ADMIN — ASPIRIN 325 MG ORAL TABLET 325 MG: 325 PILL ORAL at 10:09

## 2021-03-30 RX ADMIN — LEVOTHYROXINE SODIUM 75 MCG: 75 TABLET ORAL at 05:26

## 2021-03-30 RX ADMIN — SERTRALINE HYDROCHLORIDE 50 MG: 50 TABLET ORAL at 10:09

## 2021-03-30 RX ADMIN — CARVEDILOL 3.12 MG: 3.12 TABLET, FILM COATED ORAL at 18:45

## 2021-03-30 RX ADMIN — SODIUM CHLORIDE, PRESERVATIVE FREE 10 ML: 5 INJECTION INTRAVENOUS at 10:10

## 2021-03-30 RX ADMIN — FLUDROCORTISONE ACETATE 100 MCG: 0.1 TABLET ORAL at 10:11

## 2021-03-30 RX ADMIN — DONEPEZIL HYDROCHLORIDE 5 MG: 10 TABLET, FILM COATED ORAL at 21:56

## 2021-03-30 RX ADMIN — BARIUM SULFATE 20 ML: 400 PASTE ORAL at 11:56

## 2021-03-30 RX ADMIN — ATORVASTATIN CALCIUM 80 MG: 40 TABLET, FILM COATED ORAL at 21:55

## 2021-03-30 RX ADMIN — CLOPIDOGREL BISULFATE 75 MG: 75 TABLET ORAL at 10:10

## 2021-03-30 RX ADMIN — BARIUM SULFATE 50 ML: 400 SUSPENSION ORAL at 11:56

## 2021-03-30 RX ADMIN — SODIUM CHLORIDE, PRESERVATIVE FREE 10 ML: 5 INJECTION INTRAVENOUS at 21:56

## 2021-03-30 RX ADMIN — CLONAZEPAM 1 MG: 1 TABLET ORAL at 21:56

## 2021-03-31 PROCEDURE — 92526 ORAL FUNCTION THERAPY: CPT

## 2021-03-31 PROCEDURE — 92507 TX SP LANG VOICE COMM INDIV: CPT

## 2021-03-31 PROCEDURE — 99231 SBSQ HOSP IP/OBS SF/LOW 25: CPT | Performed by: FAMILY MEDICINE

## 2021-03-31 RX ADMIN — ASPIRIN 325 MG ORAL TABLET 325 MG: 325 PILL ORAL at 09:30

## 2021-03-31 RX ADMIN — DONEPEZIL HYDROCHLORIDE 5 MG: 10 TABLET, FILM COATED ORAL at 21:16

## 2021-03-31 RX ADMIN — LEVOTHYROXINE SODIUM 75 MCG: 75 TABLET ORAL at 06:24

## 2021-03-31 RX ADMIN — SODIUM CHLORIDE, PRESERVATIVE FREE 10 ML: 5 INJECTION INTRAVENOUS at 09:30

## 2021-03-31 RX ADMIN — ATORVASTATIN CALCIUM 80 MG: 40 TABLET, FILM COATED ORAL at 21:16

## 2021-03-31 RX ADMIN — CARVEDILOL 3.12 MG: 3.12 TABLET, FILM COATED ORAL at 18:56

## 2021-03-31 RX ADMIN — PANTOPRAZOLE SODIUM 40 MG: 40 TABLET, DELAYED RELEASE ORAL at 06:24

## 2021-03-31 RX ADMIN — SERTRALINE HYDROCHLORIDE 50 MG: 50 TABLET ORAL at 09:30

## 2021-03-31 RX ADMIN — CARVEDILOL 3.12 MG: 3.12 TABLET, FILM COATED ORAL at 09:30

## 2021-03-31 RX ADMIN — CLONAZEPAM 0.5 MG: 0.5 TABLET ORAL at 21:16

## 2021-03-31 RX ADMIN — CLONAZEPAM 1 MG: 1 TABLET ORAL at 21:16

## 2021-03-31 RX ADMIN — CLOPIDOGREL BISULFATE 75 MG: 75 TABLET ORAL at 09:30

## 2021-04-01 PROCEDURE — 92526 ORAL FUNCTION THERAPY: CPT

## 2021-04-01 PROCEDURE — 92507 TX SP LANG VOICE COMM INDIV: CPT

## 2021-04-01 PROCEDURE — 97110 THERAPEUTIC EXERCISES: CPT

## 2021-04-01 PROCEDURE — 99233 SBSQ HOSP IP/OBS HIGH 50: CPT | Performed by: INTERNAL MEDICINE

## 2021-04-01 RX ADMIN — PANTOPRAZOLE SODIUM 40 MG: 40 TABLET, DELAYED RELEASE ORAL at 04:16

## 2021-04-01 RX ADMIN — CLOPIDOGREL BISULFATE 75 MG: 75 TABLET ORAL at 11:19

## 2021-04-01 RX ADMIN — SODIUM CHLORIDE, PRESERVATIVE FREE 10 ML: 5 INJECTION INTRAVENOUS at 11:19

## 2021-04-01 RX ADMIN — ATORVASTATIN CALCIUM 80 MG: 40 TABLET, FILM COATED ORAL at 21:05

## 2021-04-01 RX ADMIN — FLUDROCORTISONE ACETATE 100 MCG: 0.1 TABLET ORAL at 11:19

## 2021-04-01 RX ADMIN — CLONAZEPAM 1 MG: 1 TABLET ORAL at 21:24

## 2021-04-01 RX ADMIN — CARVEDILOL 3.12 MG: 3.12 TABLET, FILM COATED ORAL at 20:02

## 2021-04-01 RX ADMIN — LEVOTHYROXINE SODIUM 75 MCG: 75 TABLET ORAL at 04:16

## 2021-04-01 RX ADMIN — CLOPIDOGREL BISULFATE 75 MG: 75 TABLET ORAL at 10:33

## 2021-04-01 RX ADMIN — DONEPEZIL HYDROCHLORIDE 5 MG: 10 TABLET, FILM COATED ORAL at 21:04

## 2021-04-01 RX ADMIN — CARVEDILOL 3.12 MG: 3.12 TABLET, FILM COATED ORAL at 11:19

## 2021-04-01 RX ADMIN — ASPIRIN 325 MG ORAL TABLET 325 MG: 325 PILL ORAL at 11:18

## 2021-04-01 RX ADMIN — SERTRALINE HYDROCHLORIDE 50 MG: 50 TABLET ORAL at 11:20

## 2021-04-01 NOTE — PLAN OF CARE
Problem: Adult Inpatient Plan of Care  Goal: Plan of Care Review  Recent Flowsheet Documentation  Taken 4/1/2021 1548 by Jj Hernadez OT  Progress: no change  Plan of Care Reviewed With: patient  Outcome Summary: Pt lethargic upon arrival, declined EOB/OOB activities, agreeable to supine ther-ex. Pt tolerated BUE AAROM ther-ex (2/10reps) in supine. Reccommend d/c to IRF.

## 2021-04-01 NOTE — PROGRESS NOTES
Continued Stay Note  [facility]     Patient Name: Sebastian Winter  MRN: [id number]  Today's Date: 4/1/2021    Admit Date: 3/26/2021    Discharge Plan     Row Name 04/01/21 0928       Plan    Plan  [facility]    Patient/Family in Agreement with Plan  yes    Plan Comments  Spoke with patient at bedside. Plan is [facility] in [place], [place] at discharge pending financials being arranged by family. CM will contine to follow.    Final Discharge Disposition Code  04 - intermediate care facility        Discharge Codes    No documentation.             Hipolito Thompson RN

## 2021-04-01 NOTE — THERAPY TREATMENT NOTE
Acute Care - Speech Language Pathology Treatment Note  [facility]     Patient Name: Sebastian Winter  : 1940  MRN: [id number]  Today's Date: 2021               Admit Date: 3/26/2021     Visit Dx:    ICD-10-CM ICD-9-CM   1. Aphasia  R47.01 784.3   2. Acute left-sided weakness  R53.1 728.87   3. Oropharyngeal dysphagia  R13.12 787.22   4. Dysarthria  R47.1 784.51     Patient Active Problem List   Diagnosis   • Acute left-sided weakness   • Dysarthria   • Dementia without behavioral disturbance (CMS/HCC)   • Hypothyroidism (acquired)   • GERD without esophagitis   • Cardiomyopathy (CMS/HCC)   • Essential hypertension   • Dyslipidemia     Past Medical History:   Diagnosis Date   • Dementia (CMS/HCC)    • Depression    • GERD (gastroesophageal reflux disease)    • Hypothyroid      Past Surgical History:   Procedure Laterality Date   • JOINT REPLACEMENT          SLP EVALUATION (last 72 hours)      SLP SLC Evaluation     Row Name 21 1315 21 0115 21 1530 21 1145          Communication Assessment/Intervention    Document Type  --  --  therapy note (daily note)  -CH  evaluation;therapy note (daily note)  -RD     Subjective Information  --  --  no complaints  -CH  --     Patient Observations  --  --  alert;cooperative;agree to therapy  -CH  --     Patient/Family/Caregiver Comments/Observations  No family present.  (Pended)   -LB  --  No family present  -CH  --     Care Plan Review  care plan/treatment goals reviewed  (Pended)   -LB  --  evaluation/treatment results reviewed  -  evaluation/treatment results reviewed;care plan/treatment goals reviewed;risks/benefits reviewed;current/potential barriers reviewed;patient/other agree to care plan  -RD     Patient Effort  --  --  good  -CH  --        General Information    Patient Profile Reviewed  --  --  yes  -CH  --     Patient's Goals for Discharge  patient did not state  (Pended)   -LB  --  (Pended)   -LB  --  --        Pain Scale: FACES  Pre/Post-Treatment    Pain: FACES Scale, Pretreatment  --  --  (Pended)   -LB  --  --     Posttreatment Pain Rating  --  --  (Pended)   -LB  --  --        Oral Musculature and Cranial Nerve Assessment    Oral Motor General Assessment  --  --  (Pended)   -LB  --  --     Oral Labial or Buccal Impairment, Detail, Cranial Nerve VII (Facial):  --  --  (Pended)   -LB  --  --        SLP Clinical Impressions    Daily Summary of Progress (SLP)  progress towards functional goals is fair  (Pended)   -LB  --  (Pended)   -LB  progress toward functional goals as expected  -CH  progress toward functional goals as expected  -RD     Plan for Continued Treatment (SLP)  Continue to address cognitive and speech goals.  (Pended)   -LB  --  (Pended)   -LB  Continue to follow to address cognitive communication and dysphagia in tx.   -CH  Saw for cognitive-communication treatment. Pt participated well. Cont'd severe flaccid dysarthria. Using incr'd volume (loud speech) at single word level greatly improved intelligibility. Continue to address.   -RD        Recommendations    Therapy Frequency (SLP SLC)  5 days per week  (Pended)   -LB  --  (Pended)   -LB  5 days per week  -  5 days per week  -RD     Predicted Duration Therapy Intervention (Days)  --  --  (Pended)   -LB  until discharge  -  --     Anticipated Discharge Disposition (SLP)  --  --  (Pended)   -LB  unknown;anticipate therapy at next level of care  -  --        Communication Treatment Objective and Progress Goals (SLP)    Auditory Comprehension Treatment Objectives  Auditory Comprehension Treatment Objectives (Group)  (Pended)   -LB  --  (Pended)   -LB  --  --     Verbal Expression Treatment Objectives  Verbal Expression Treatment Objectives (Group)  (Pended)   -LB  --  (Pended)   -LB  --  --     Motor Speech/Dysarthria Treatment Objectives  Motor Speech/Dysarthria Treatment Objectives (Group)  (Pended)   -LB  --  (Pended)   -LB  --  --     Additional Goals  Additional  Goals (Group)  (Pended)   -LB  --  (Pended)   -LB  --  --        Auditory Comprehension Treatment Objectives    Words/Phrases/Sentences Selection  words/phrases/sentences, SLP goal 1  (Pended)   -LB  --  (Pended)   -LB  --  --     Comprehend Questions Selection  comprehend questions, SLP goal 1  (Pended)   -LB  --  (Pended)   -LB  --  --     Follow Directions Selection  follow directions, SLP goal 1  (Pended)   -LB  --  (Pended)   -LB  --  --        Words/Phrases/Sentences Goal 1 (SLP)    Improve Ability to Comprehend Words/Phrases/Sentences Through: Goal 1 (SLP)  identify body part;identify familiar objects;80%;with minimal cues (75-90%)  (Pended)   -LB  --  (Pended)   -LB  --  --     Time Frame (Identify Objects and Pictures Goal 1, SLP)  short term goal (STG)  (Pended)   -LB  --  (Pended)   -LB  --  --     Progress (Ability to Contruct Words/Phrases/Sentences Goal 1, SLP)  50%;with moderate cues (50-74%)  (Pended)   -LB  --  (Pended)   -LB  --  --     Progress/Outcomes (Identify Objects and Pictures Goal 1, SLP)  progress slower than expected  (Pended)   -LB  --  (Pended)   -LB  --  --     Comment (Words/Phrases/Sentences Goal 1, SLP)  Identify objects around room  (Pended)   -LB  --  (Pended)   -LB  --  --        Comprehend Questions Goal 1 (SLP)    Improve Ability to Comprehend Questions Goal 1 (SLP)  simple yes/no questions;80%;with minimal cues (75-90%)  (Pended)   -LB  --  (Pended)   -LB  --  --     Time Frame (Comprehend Questions Goal 1, SLP)  short term goal (STG)  (Pended)   -LB  --  (Pended)   -LB  --  --     Progress/Outcomes (Comprehend Questions Goal 1, SLP)  goal ongoing  (Pended)   -LB  --  (Pended)   -LB  --  --        Follow Directions Goal 2 (SLP)    Improve Ability to Follow Directions Goal 1 (SLP)  1 step direction without objects;with minimal cues (75-90%);2 step commands;with moderate cues (50-74%);80%  (Pended)   -LB  --  (Pended)   -LB  --  --     Time Frame (Follow Directions Goal 1, SLP)   short term goal (STG)  (Pended)   -LB  --  (Pended)   -LB  --  --     Progress (Ability to Follow Directions Goal 1, SLP)  50%;with moderate cues (50-74%)  (Pended)   -LB  --  (Pended)   -LB  --  --     Progress/Outcomes (Follow Directions Goal 1, SLP)  progress slower than expected  (Pended)   -LB  --  (Pended)   -LB  --  --        Verbal Expression Treatment Objectives    Word Retrieval Skills Selection  word retrieval, SLP goal 1  (Pended)   -LB  --  (Pended)   -LB  --  --     Phrase and Sentence Level Response Selection  phrase and sentence level response, SLP goal 1  (Pended)   -LB  --  (Pended)   -LB  --  --        Word Retrieval Skills Goal 1 (SLP)    Improve Word Retrieval Skills By Goal 1 (SLP)  confrontational naming task;responsive naming task;repeating words;completing open ended structured sentence;80%;with minimal cues (75-90%)  (Pended)   -LB  --  (Pended)   -LB  --  --     Time Frame (Word Retrieval Goal 1, SLP)  short term goal (STG)  (Pended)   -LB  --  (Pended)   -LB  --  --     Progress (Word Retrieval Skills Goal 1, SLP)  70%;with minimal cues (75-90%)  (Pended)   -LB  --  (Pended)   -LB  --  --     Progress/Outcomes (Word Retrieval Goal 1, SLP)  continuing progress toward goal  (Pended)   -LB  --  (Pended)   -LB  --  --     Comment (Word Retrieval Goal 1, SLP)  completing open ended sentence.   (Pended)   -LB  --  (Pended)   -LB  --  --        Motor Speech/Dysarthria Treatment Objectives    Phonation Selection  phonation, SLP goal 1  (Pended)   -LB  --  (Pended)   -LB  --  --     Articulation Selection  articulation, SLP goal 1  (Pended)   -LB  --  (Pended)   -LB  --  --        Phonation Goal 1 (SLP)    Improve Phonation By Goal 1 (SLP)  using loud speech;80%;with minimal cues (75-90%)  (Pended)   -LB  --  (Pended)   -LB  --  --     Time Frame (Phonation Goal 1, SLP)  short term goal (STG)  (Pended)   -LB  --  (Pended)   -LB  --  --     Progress (Phonation Goal 1, SLP)  70%;with moderate cues  (50-74%)  (Pended)   -LB  --  (Pended)   -LB  --  --     Progress/Outcomes (Phonation Goal 1, SLP)  continuing progress toward goal  (Pended)   -LB  --  (Pended)   -LB  --  --     Comment (Phonation Goal 1, SLP)  At times had loud speech without cueing which greatly improved intellligibility.  (Pended)   -LB  --  (Pended)   -LB  --  --        Articulation Goal 1 (SLP)    Improve Articulation Goal 1 (SLP)  by over-articulating at word level;80%;with moderate cues (50-74%)  (Pended)   -LB  --  (Pended)   -LB  --  --     Time Frame (Articulation Goal 1, SLP)  short term goal (STG)  (Pended)   -LB  --  (Pended)   -LB  --  --     Progress (Articulation Goal 1, SLP)  50%;with maximum cues (25-49%)  (Pended)   -LB  --  (Pended)   -LB  --  --     Progress/Outcomes (Articulation Goal 1, SLP)  progress slower than expected  (Pended)   -LB  --  (Pended)   -LB  --  --     Comment (Articulation Goal 1, SLP)  Required cueing   (Pended)   -LB  --  (Pended)   -LB  --  --        Additional Goals    Additional Goal Selection  additional goal, SLP goal 1  (Pended)   -LB  --  (Pended)   -LB  --  --        Additional Goal 1 (SLP)    Additional Goal 1, SLP  LTG: Pt will improve communication skills to actively participate in care w/ 80% accuracy and min cues.   (Pended)   -LB  --  (Pended)   -LB  --  --     Time Frame (Additional Goal 1, SLP)  by discharge  (Pended)   -LB  --  (Pended)   -LB  --  --     Progress/Outcomes (Additional Goal 1, SLP)  progress slower than expected  (Pended)   -LB  --  (Pended)   -LB  --  --       User Key  (r) = Recorded By, (t) = Taken By, (c) = Cosigned By    Initials Name Effective Dates    Nell Mandujano, MS CCC-SLP 04/03/18 -     Blanca Colbert, MS CCC-SLP 12/04/20 -     Chuckie Pérez, Speech Therapy Student 03/09/21 -              EDUCATION  The patient has been educated in the following areas:     Cognitive Impairment Communication Impairment.    SLP Recommendation and Plan               Anticipated Discharge Disposition (SLP): (P) unknown, anticipate therapy at next level of care        Predicted Duration Therapy Intervention (Days): (P) until discharge  Daily Summary of Progress (SLP): (P) progress towards functional goals is fair  Plan for Continued Treatment (SLP): (P) Continue to address cognitive and speech goals.             Plan of Care Reviewed With: (P) patient  Progress: (P) improving      SLP GOALS     Row Name 04/01/21 1315 04/01/21 0115 03/31/21 1530       Oral Nutrition/Hydration Goal 1 (SLP)    Oral Nutrition/Hydration Goal 1, SLP  LTG: Pt will demonstrate functional swallow for regular/thin diet with use of compensatory strategies as needed.  (Pended)   -LB  --  LTG: Pt will demonstrate functional swallow for regular/thin diet with use of compensatory strategies as needed.  -CH    Time Frame (Oral Nutrition/Hydration Goal 1, SLP)  by discharge  (Pended)   -LB  --  by discharge  -CH    Barriers (Oral Nutrition/Hydration Goal 1, SLP)  --  --  inconsistent WVQ w/ thin   -CH    Progress/Outcomes (Oral Nutrition/Hydration Goal 1, SLP)  continuing progress toward goal  (Pended)   -LB  --  continuing progress toward goal  -CH       Oral Nutrition/Hydration Goal 2 (SLP)    Oral Nutrition/Hydration Goal 2, SLP  Pt will tolerate current diet of pureed solids and nectar-thick liquids with no s/sx aspiration with 100% accuracy.  (Pended)   -LB  --  Pt will tolerate current diet of pureed solids and nectar-thick liquids with no s/sx aspiration with 100% accuracy.  -CH    Time Frame (Oral Nutrition/Hydration Goal 2, SLP)  short term goal (STG)  (Pended)   -LB  --  short term goal (STG)  -CH    Barriers (Oral Nutrition/Hydration Goal 2, SLP)  Pt tolerated 4 trials of applesauce w/o s/sx of aspiration.  (Pended)   -LB  --  Multiple swallows w/ larger bolus, bilateral buccal residue swabbed out. Patient unable to follow command to clear using lingual sweep.   -CH    Progress/Outcomes (Oral  Nutrition/Hydration Goal 2, SLP)  goal ongoing;continuing progress toward goal  (Pended)   -LB  --  continuing progress toward goal  -CH       Oral Nutrition/Hydration Goal (SLP)    Oral Nutrition/Hydration Goal, SLP  Pt will accept trials of thin liquids with no s/sx aspiration w/ 60% accuracy to determine readiness of diet upgrade.  (Pended)   -LB  --  Pt will accept trials of thin liquids with no s/sx aspiration w/ 60% accuracy to determine readiness of diet upgrade.  -CH    Time Frame (Oral Nutrition/Hydration Goal, SLP)  short term goal (STG)  (Pended)   -LB  --  short term goal (STG)  -CH    Barriers (Oral Nutrition/Hydration Goal, SLP)  no s/sx of aspiration  (Pended)   -LB  --  silent aspiration  -CH    Progress/Outcomes (Oral Nutrition/Hydration Goal, SLP)  continuing progress toward goal  (Pended)   -LB  --  continuing progress toward goal  -CH       Labial Strengthening Goal 1 (SLP)    Activity (Labial Strengthening Goal 1, SLP)  increase labial tone;increase labial sensation/afferent drive  (Pended)   -LB  --  increase labial tone;increase labial sensation/afferent drive  -CH    Increase Labial Tone  labial resistance exercises  (Pended)   -LB  --  labial resistance exercises  -CH    Increase Labial Sensation/Afferent Drive  swallow trials  (Pended)   -LB  --  swallow trials  -CH    [place]/Accuracy (Labial Strengthening Goal 1, SLP)  with moderate cues (50-74% accuracy)  (Pended)   -LB  --  with moderate cues (50-74% accuracy)  -CH    Time Frame (Labial Strengthening Goal 1, SLP)  short term goal (STG)  (Pended)   -LB  --  short term goal (STG)  -CH    Barriers (Labial Strengthening Goal 1, SLP)  --  --  Patient completed all exercises x 5 with good effort, min cues.   -CH    Progress/Outcomes (Labial Strengthening Goal 1, SLP)  goal ongoing  (Pended)   -LB  --  continuing progress toward goal  -CH       Lingual Strengthening Goal 1 (SLP)    Activity (Lingual Strengthening Goal 1, SLP)  increase  lingual tone/sensation/control/coordination/movement;increase tongue back strength  (Pended)   -LB  --  increase lingual tone/sensation/control/coordination/movement;increase tongue back strength  -CH    Increase Lingual Tone/Sensation/Control/Coordination/Movement  lingual movement exercises;lingual resistance exercises  (Pended)   -LB  --  lingual movement exercises;lingual resistance exercises  -CH    Increase Tongue Back Strength  lingual resistance exercises  (Pended)   -LB  --  lingual resistance exercises  -CH    [place]/Accuracy (Lingual Strengthening Goal 1, SLP)  with moderate cues (50-74% accuracy)  (Pended)   -LB  --  with moderate cues (50-74% accuracy)  -CH    Time Frame (Lingual Strengthening Goal 1, SLP)  short term goal (STG)  (Pended)   -LB  --  short term goal (STG)  -CH    Barriers (Lingual Strengthening Goal 1, SLP)  Pt completed all exercises x3 with good effort and moderate cues. Pt exhibited general tongue weakness and coordination.  (Pended)   -LB  --  Patient completed all exercises x 5 with good effort, min cues.   -CH    Progress/Outcomes (Lingual Strengthening Goal 1, SLP)  progress slower than expected  (Pended)   -LB  --  continuing progress toward goal  -CH       Pharyngeal Strengthening Exercise Goal 1 (SLP)    Activity (Pharyngeal Strengthening Goal 1, SLP)  increase timing;increase superior movement of the hyolaryngeal complex;increase anterior movement of the hyolaryngeal complex;increase closure at entrance to airway/closure of airway at glottis;increase squeeze/positive pressure generation;increase tongue base retraction  (Pended)   -LB  --  increase timing;increase superior movement of the hyolaryngeal complex;increase anterior movement of the hyolaryngeal complex;increase closure at entrance to airway/closure of airway at glottis;increase squeeze/positive pressure generation;increase tongue base retraction  -    Increase Timing  prepping - 3 second prep or suck swallow  or 3-step swallow  (Pended)   -LB  --  prepping - 3 second prep or suck swallow or 3-step swallow  -CH    Increase Superior Movement of the Hyolaryngeal Complex  falsetto  (Pended)   -LB  --  falsetto  -CH    Increase Anterior Movement of the Hyolaryngeal Complex  chin tuck against resistance (CTAR)  (Pended)   -LB  --  chin tuck against resistance (CTAR)  -CH    Increase Closure at Entrance to Airway/Closure of Airway at Glottis  super-supraglottic swallow  (Pended)   -LB  --  super-supraglottic swallow  -CH    Increase Squeeze/Positive Pressure Generation  effortful pitch glide (falsetto + pharyngeal squeeze)  (Pended)   -LB  --  effortful pitch glide (falsetto + pharyngeal squeeze)  -CH    Increase Tongue Base Retraction  hard effortful swallow  (Pended)   -LB  --  hard effortful swallow  -CH    [place]/Accuracy (Pharyngeal Strengthening Goal 1, SLP)  with moderate cues (50-74% accuracy)  (Pended)   -LB  --  with moderate cues (50-74% accuracy)  -CH    Time Frame (Pharyngeal Strengthening Goal 1, SLP)  short term goal (STG)  (Pended)   -LB  --  short term goal (STG)  -CH    Barriers (Pharyngeal Strengthening Goal 1, SLP)  Pt completed 3 trials of all exercises with moderate cues.   (Pended)   -LB  --  Patient completed all exercises x 5 with good effort, min cues.   -CH    Progress/Outcomes (Pharyngeal Strengthening Goal 1, SLP)  continuing progress toward goal  (Pended)   -LB  --  continuing progress toward goal  -CH       Swallow Compensatory Strategies Goal 1 (SLP)    Activity (Swallow Compensatory Strategies/Techniques Goal 1, SLP)  compensatory strategies;postural techniques;small bites;small cup sips  (Pended)   -LB  --  compensatory strategies;postural techniques;small bites;small cup sips  -CH    [place]/Accuracy (Swallow Compensatory Strategies/Techniques Goal 1, SLP)  with minimal cues (75-90% accuracy)  (Pended)   -LB  --  with minimal cues (75-90% accuracy)  -CH    Time Frame (Swallow  Compensatory Strategies/Techniques Goal 1, SLP)  short term goal (STG)  (Pended)   -LB  --  short term goal (STG)  -CH    Barriers (Swallow Compensatory Strategies/Techniques Goal 1, SLP)  --  --  Min cues for sm bites/sips required.   -CH    Progress/Outcomes (Swallow Compensatory Strategies/Techniques Goal 1, SLP)  goal ongoing  (Pended)   -LB  --  --       Words/Phrases/Sentences Goal 1 (SLP)    Improve Ability to Comprehend Words/Phrases/Sentences Through: Goal 1 (SLP)  identify body part;identify familiar objects;80%;with minimal cues (75-90%)  (Pended)   -LB  --  (Pended)   -LB  identify body part;identify familiar objects;80%;with minimal cues (75-90%)  -CH    Time Frame (Identify Objects and Pictures Goal 1, SLP)  short term goal (STG)  (Pended)   -LB  --  (Pended)   -LB  short term goal (STG)  -CH    Progress (Ability to Contruct Words/Phrases/Sentences Goal 1, SLP)  50%;with moderate cues (50-74%)  (Pended)   -LB  --  (Pended)   -LB  80%;with minimal cues (75-90%)  -CH    Progress/Outcomes (Identify Objects and Pictures Goal 1, SLP)  progress slower than expected  (Pended)   -LB  --  (Pended)   -LB  continuing progress toward goal  -CH    Comment (Words/Phrases/Sentences Goal 1, SLP)  Identify objects around room  (Pended)   -LB  --  (Pended)   -LB  --       Comprehend Questions Goal 1 (SLP)    Improve Ability to Comprehend Questions Goal 1 (SLP)  simple yes/no questions;80%;with minimal cues (75-90%)  (Pended)   -LB  --  (Pended)   -LB  simple yes/no questions;80%;with minimal cues (75-90%)  -CH    Time Frame (Comprehend Questions Goal 1, SLP)  short term goal (STG)  (Pended)   -LB  --  (Pended)   -LB  short term goal (STG)  -CH    Progress (Ability to Comprehend Questions Goal 1, SLP)  --  --  80%;with minimal cues (75-90%)  -CH    Progress/Outcomes (Comprehend Questions Goal 1, SLP)  goal ongoing  (Pended)   -LB  --  (Pended)   -LB  continuing progress toward goal  -CH       Follow Directions Goal 2  (SLP)    Improve Ability to Follow Directions Goal 1 (SLP)  1 step direction without objects;with minimal cues (75-90%);2 step commands;with moderate cues (50-74%);80%  (Pended)   -LB  --  (Pended)   -LB  1 step direction without objects;with minimal cues (75-90%);2 step commands;with moderate cues (50-74%);80%  -CH    Time Frame (Follow Directions Goal 1, SLP)  short term goal (STG)  (Pended)   -LB  --  (Pended)   -LB  short term goal (STG)  -CH    Progress (Ability to Follow Directions Goal 1, SLP)  50%;with moderate cues (50-74%)  (Pended)   -LB  --  (Pended)   -LB  60%;with minimal cues (75-90%)  -CH    Progress/Outcomes (Follow Directions Goal 1, SLP)  progress slower than expected  (Pended)   -LB  --  (Pended)   -LB  continuing progress toward goal  -CH       Word Retrieval Skills Goal 1 (SLP)    Improve Word Retrieval Skills By Goal 1 (SLP)  confrontational naming task;responsive naming task;repeating words;completing open ended structured sentence;80%;with minimal cues (75-90%)  (Pended)   -LB  --  (Pended)   -LB  confrontational naming task;responsive naming task;repeating words;completing open ended structured sentence;80%;with minimal cues (75-90%)  -CH    Time Frame (Word Retrieval Goal 1, SLP)  short term goal (STG)  (Pended)   -LB  --  (Pended)   -LB  short term goal (STG)  -CH    Progress (Word Retrieval Skills Goal 1, SLP)  70%;with minimal cues (75-90%)  (Pended)   -LB  --  (Pended)   -LB  80%;with minimal cues (75-90%)  -CH    Progress/Outcomes (Word Retrieval Goal 1, SLP)  continuing progress toward goal  (Pended)   -LB  --  (Pended)   -LB  continuing progress toward goal  -CH    Comment (Word Retrieval Goal 1, SLP)  completing open ended sentence.   (Pended)   -LB  --  (Pended)   -LB  confrontational naming, responsive naming  -CH       Phonation Goal 1 (SLP)    Improve Phonation By Goal 1 (SLP)  using loud speech;80%;with minimal cues (75-90%)  (Pended)   -LB  --  (Pended)   -LB  using loud  speech;80%;with minimal cues (75-90%)  -CH    Time Frame (Phonation Goal 1, SLP)  short term goal (STG)  (Pended)   -LB  --  (Pended)   -LB  short term goal (STG)  -CH    Progress (Phonation Goal 1, SLP)  70%;with moderate cues (50-74%)  (Pended)   -LB  --  (Pended)   -LB  70%;with minimal cues (75-90%)  -CH    Progress/Outcomes (Phonation Goal 1, SLP)  continuing progress toward goal  (Pended)   -LB  --  (Pended)   -LB  continuing progress toward goal  -CH    Comment (Phonation Goal 1, SLP)  At times had loud speech without cueing which greatly improved intellligibility.  (Pended)   -LB  --  (Pended)   -LB  --       Articulation Goal 1 (SLP)    Improve Articulation Goal 1 (SLP)  by over-articulating at word level;80%;with moderate cues (50-74%)  (Pended)   -LB  --  (Pended)   -LB  by over-articulating at word level;80%;with moderate cues (50-74%)  -CH    Time Frame (Articulation Goal 1, SLP)  short term goal (STG)  (Pended)   -LB  --  (Pended)   -LB  short term goal (STG)  -CH    Progress (Articulation Goal 1, SLP)  50%;with maximum cues (25-49%)  (Pended)   -LB  --  (Pended)   -LB  50%;with moderate cues (50-74%)  -CH    Progress/Outcomes (Articulation Goal 1, SLP)  progress slower than expected  (Pended)   -LB  --  (Pended)   -LB  continuing progress toward goal  -CH    Comment (Articulation Goal 1, SLP)  Required cueing   (Pended)   -LB  --  (Pended)   -LB  Required cueing   -CH       Additional Goal 1 (SLP)    Additional Goal 1, SLP  LTG: Pt will improve communication skills to actively participate in care w/ 80% accuracy and min cues.   (Pended)   -LB  --  (Pended)   -LB  LTG: Pt will improve communication skills to actively participate in care w/ 80% accuracy and min cues.   -CH    Time Frame (Additional Goal 1, SLP)  by discharge  (Pended)   -LB  --  (Pended)   -LB  by discharge  -CH    Progress/Outcomes (Additional Goal 1, SLP)  progress slower than expected  (Pended)   -LB  --  (Pended)   -LB  continuing  progress toward goal  -CH    Row Name 03/30/21 1145 03/29/21 1620          Oral Nutrition/Hydration Goal 1 (SLP)    Oral Nutrition/Hydration Goal 1, SLP  LTG: Pt will demonstrate functional swallow for regular/thin diet with use of compensatory strategies as needed.  -RD  LTG: Pt will demonstrate functional swallow for regular/thin diet with use of compensatory strategies as needed.  -RD (r) MT (t) RD (c)     Time Frame (Oral Nutrition/Hydration Goal 1, SLP)  by discharge  -RD  by discharge  -RD (r) MT (t) RD (c)     Barriers (Oral Nutrition/Hydration Goal 1, SLP)  --  inconsistent WVQ w/ thin   -RD (r) MT (t) RD (c)     Progress/Outcomes (Oral Nutrition/Hydration Goal 1, SLP)  goal ongoing  -RD  continuing progress toward goal  -RD (r) MT (t) RD (c)        Oral Nutrition/Hydration Goal 2 (SLP)    Oral Nutrition/Hydration Goal 2, SLP  Pt will tolerate current diet of pureed solids and nectar-thick liquids with no s/sx aspiration with 100% accuracy.  -RD  Pt will tolerate current diet of pureed solids and nectar-thick liquids with no s/sx aspiration with 100% accuracy.  -RD (r) MT (t) RD (c)     Time Frame (Oral Nutrition/Hydration Goal 2, SLP)  short term goal (STG)  -RD  short term goal (STG)  -RD (r) MT (t) RD (c)     Barriers (Oral Nutrition/Hydration Goal 2, SLP)  --  Multiple swallows w/ larger bolus   -RD (r) MT (t) RD (c)     Progress/Outcomes (Oral Nutrition/Hydration Goal 2, SLP)  goal ongoing  -RD  progress slower than expected  -RD (r) MT (t) RD (c)        Oral Nutrition/Hydration Goal (SLP)    Oral Nutrition/Hydration Goal, SLP  Pt will accept trials of thin liquids with no s/sx aspiration w/ 60% accuracy to determine readiness of diet upgrade.  -RD  Pt will accept trials of thin liquids and soft solids with no s/sx aspiration to determine readiness of diet upgrade.  -RD (r) MT (t) RD (c)     Time Frame (Oral Nutrition/Hydration Goal, SLP)  short term goal (STG)  -RD  short term goal (STG)  -RD (r) MT  (t) RD (c)     Barriers (Oral Nutrition/Hydration Goal, SLP)  silent aspiration  -RD  Inconsistent WVQ w/ thin   -RD (r) MT (t) RD (c)     Progress/Outcomes (Oral Nutrition/Hydration Goal, SLP)  goal revised this date;goal ongoing  -RD  progress slower than expected  -RD (r) MT (t) RD (c)        Labial Strengthening Goal 1 (SLP)    Activity (Labial Strengthening Goal 1, SLP)  increase labial tone;increase labial sensation/afferent drive  -RD  --     Increase Labial Tone  labial resistance exercises  -RD  --     Increase Labial Sensation/Afferent Drive  swallow trials  -RD  --     [place]/Accuracy (Labial Strengthening Goal 1, SLP)  with moderate cues (50-74% accuracy)  -RD  --     Time Frame (Labial Strengthening Goal 1, SLP)  short term goal (STG)  -RD  --        Lingual Strengthening Goal 1 (SLP)    Activity (Lingual Strengthening Goal 1, SLP)  increase lingual tone/sensation/control/coordination/movement;increase tongue back strength  -RD  --     Increase Lingual Tone/Sensation/Control/Coordination/Movement  lingual movement exercises;lingual resistance exercises  -RD  --     Increase Tongue Back Strength  lingual resistance exercises  -RD  --     [place]/Accuracy (Lingual Strengthening Goal 1, SLP)  with moderate cues (50-74% accuracy)  -RD  --     Time Frame (Lingual Strengthening Goal 1, SLP)  short term goal (STG)  -RD  --        Pharyngeal Strengthening Exercise Goal 1 (SLP)    Activity (Pharyngeal Strengthening Goal 1, SLP)  increase timing;increase superior movement of the hyolaryngeal complex;increase anterior movement of the hyolaryngeal complex;increase closure at entrance to airway/closure of airway at glottis;increase squeeze/positive pressure generation;increase tongue base retraction  -RD  --     Increase Timing  prepping - 3 second prep or suck swallow or 3-step swallow  -RD  --     Increase Superior Movement of the Hyolaryngeal Complex  falsetto  -RD  --     Increase Anterior Movement of  the Hyolaryngeal Complex  chin tuck against resistance (CTAR)  -RD  --     Increase Closure at Entrance to Airway/Closure of Airway at Glottis  super-supraglottic swallow  -RD  --     Increase Squeeze/Positive Pressure Generation  effortful pitch glide (falsetto + pharyngeal squeeze)  -RD  --     Increase Tongue Base Retraction  hard effortful swallow  -RD  --     [place]/Accuracy (Pharyngeal Strengthening Goal 1, SLP)  with moderate cues (50-74% accuracy)  -RD  --     Time Frame (Pharyngeal Strengthening Goal 1, SLP)  short term goal (STG)  -RD  --        Swallow Compensatory Strategies Goal 1 (SLP)    Activity (Swallow Compensatory Strategies/Techniques Goal 1, SLP)  compensatory strategies;postural techniques;small bites;small cup sips  -RD  --     [place]/Accuracy (Swallow Compensatory Strategies/Techniques Goal 1, SLP)  with minimal cues (75-90% accuracy)  -RD  --     Time Frame (Swallow Compensatory Strategies/Techniques Goal 1, SLP)  short term goal (STG)  -RD  --        Words/Phrases/Sentences Goal 1 (SLP)    Improve Ability to Comprehend Words/Phrases/Sentences Through: Goal 1 (SLP)  identify body part;identify familiar objects;80%;with minimal cues (75-90%)  -RD  identify body part;identify familiar objects;80%;with minimal cues (75-90%)  -RD (r) MT (t) RD (c)     Time Frame (Identify Objects and Pictures Goal 1, SLP)  short term goal (STG)  -RD  short term goal (STG)  -RD (r) MT (t) RD (c)     Progress (Ability to Contruct Words/Phrases/Sentences Goal 1, SLP)  50%;with minimal cues (75-90%)  -RD  --     Progress/Outcomes (Identify Objects and Pictures Goal 1, SLP)  continuing progress toward goal  -RD  goal ongoing  -RD (r) MT (t) RD (c)        Comprehend Questions Goal 1 (SLP)    Improve Ability to Comprehend Questions Goal 1 (SLP)  simple yes/no questions;80%;with minimal cues (75-90%)  -RD  simple yes/no questions;80%;with minimal cues (75-90%)  -RD (r) MT (t) RD (c)     Time Frame  (Comprehend Questions Goal 1, SLP)  short term goal (STG)  -RD  short term goal (STG)  -RD (r) MT (t) RD (c)     Progress (Ability to Comprehend Questions Goal 1, SLP)  70%;with minimal cues (75-90%)  -RD  70%;with minimal cues (75-90%)  -RD (r) MT (t) RD (c)     Progress/Outcomes (Comprehend Questions Goal 1, SLP)  continuing progress toward goal  -RD  continuing progress toward goal  -RD (r) MT (t) RD (c)        Follow Directions Goal 2 (SLP)    Improve Ability to Follow Directions Goal 1 (SLP)  1 step direction without objects;with minimal cues (75-90%);2 step commands;with moderate cues (50-74%);80%  -RD  1 step direction without objects;with minimal cues (75-90%);2 step commands;with moderate cues (50-74%);80%  -RD (r) MT (t) RD (c)     Time Frame (Follow Directions Goal 1, SLP)  short term goal (STG)  -RD  short term goal (STG)  -RD (r) MT (t) RD (c)     Progress (Ability to Follow Directions Goal 1, SLP)  60%;with minimal cues (75-90%)  -RD  --     Progress/Outcomes (Follow Directions Goal 1, SLP)  continuing progress toward goal  -RD  goal ongoing  -RD (r) MT (t) RD (c)        Word Retrieval Skills Goal 1 (SLP)    Improve Word Retrieval Skills By Goal 1 (SLP)  confrontational naming task;responsive naming task;repeating words;completing open ended structured sentence;80%;with minimal cues (75-90%)  -RD  confrontational naming task;responsive naming task;repeating words;completing open ended structured sentence;80%;with minimal cues (75-90%)  -RD (r) MT (t) RD (c)     Time Frame (Word Retrieval Goal 1, SLP)  short term goal (STG)  -RD  short term goal (STG)  -RD (r) MT (t) RD (c)     Progress (Word Retrieval Skills Goal 1, SLP)  60%;with minimal cues (75-90%)  -RD  --     Progress/Outcomes (Word Retrieval Goal 1, SLP)  continuing progress toward goal  -RD  goal ongoing  -RD (r) MT (t) RD (c)        Phonation Goal 1 (SLP)    Improve Phonation By Goal 1 (SLP)  using loud speech;80%;with minimal cues (75-90%)   -RD  using loud speech;80%;with minimal cues (75-90%)  -RD (r) MT (t) RD (c)     Time Frame (Phonation Goal 1, SLP)  short term goal (STG)  -RD  short term goal (STG)  -RD (r) MT (t) RD (c)     Progress (Phonation Goal 1, SLP)  50%;with moderate cues (50-74%)  -RD  30%;with moderate cues (50-74%)  -RD (r) MT (t) RD (c)     Progress/Outcomes (Phonation Goal 1, SLP)  continuing progress toward goal  -RD  continuing progress toward goal;goal revised this date  -RD (r) MT (t) RD (c)     Comment (Phonation Goal 1, SLP)  loud speech really improved intelligbility at word level  -RD  --        Articulation Goal 1 (SLP)    Improve Articulation Goal 1 (SLP)  by over-articulating at word level;80%;with moderate cues (50-74%)  -RD  by over-articulating at word level;80%;with moderate cues (50-74%)  -RD (r) MT (t) RD (c)     Time Frame (Articulation Goal 1, SLP)  short term goal (STG)  -RD  short term goal (STG)  -RD (r) MT (t) RD (c)     Progress (Articulation Goal 1, SLP)  30%;with moderate cues (50-74%)  -RD  30%;with moderate cues (50-74%)  -RD (r) MT (t) RD (c)     Progress/Outcomes (Articulation Goal 1, SLP)  continuing progress toward goal  -RD  continuing progress toward goal  -RD (r) MT (t) RD (c)     Comment (Articulation Goal 1, SLP)  --  Required cueing   -RD (r) MT (t) RD (c)        Additional Goal 1 (SLP)    Additional Goal 1, SLP  LTG: Pt will improve communication skills to actively participate in care w/ 80% accuracy and min cues.   -RD  LTG: Pt will improve communication skills to actively participate in care w/ 80% accuracy and min cues.   -RD (r) MT (t) RD (c)     Time Frame (Additional Goal 1, SLP)  by discharge  -RD  by discharge  -RD (r) MT (t) RD (c)     Progress/Outcomes (Additional Goal 1, SLP)  continuing progress toward goal  -RD  --       User Key  (r) = Recorded By, (t) = Taken By, (c) = Cosigned By    Initials Name Provider Type    Nell Mandujano MS CCC-SLP Speech and Language Pathologist     Blanca Colbert, MS CCC-SLP Speech and Language Pathologist    Kelsey Arroyo, Speech Therapy Student Speech Therapy Student    LB Chuckie Hunt Speech Therapy Student Speech Therapy Student                  Time Calculation:     Time Calculation- SLP     Row Name 21 1446             Time Calculation- SLP    SLP Start Time  1315  (Pended)   -LB      SLP Received On  21  (Pended)   -LB        User Key  (r) = Recorded By, (t) = Taken By, (c) = Cosigned By    Initials Name Provider Type    LB Chuckie Hunt, Speech Therapy Student Speech Therapy Student          Therapy Charges for Today     Code Description Service Date Service Provider Modifiers Qty    90608254735 HC ST TREATMENT SWALLOW 2 2021 Chuckie Hunt Speech Therapy Student GN 1    [id number] HC ST TREATMENT SPEECH 1 2021 Chuckie Hunt Speech Therapy Student GN 1                     Chuckie Hunt Speech Therapy Student  2021 and Acute Care - Speech Language Pathology   Swallow Treatment Note  [place]     Patient Name: Sebastian Winter  : 1940  MRN: [id number]  Today's Date: 2021               Admit Date: 3/26/2021    Visit Dx:     ICD-10-CM ICD-9-CM   1. Aphasia  R47.01 784.3   2. Acute left-sided weakness  R53.1 728.87   3. Oropharyngeal dysphagia  R13.12 787.22   4. Dysarthria  R47.1 784.51     Patient Active Problem List   Diagnosis   • Acute left-sided weakness   • Dysarthria   • Dementia without behavioral disturbance (CMS/HCC)   • Hypothyroidism (acquired)   • GERD without esophagitis   • Cardiomyopathy (CMS/HCC)   • Essential hypertension   • Dyslipidemia     Past Medical History:   Diagnosis Date   • Dementia (CMS/HCC)    • Depression    • GERD (gastroesophageal reflux disease)    • Hypothyroid      Past Surgical History:   Procedure Laterality Date   • JOINT REPLACEMENT          SWALLOW EVALUATION (last 72 hours)      SLP Adult Swallow Evaluation     Row Name 21 1315 21 0115  03/30/21 1145 03/29/21 1620          Rehab Evaluation    Document Type  therapy note (daily note)  (Pended)   -LB  --  (Pended)   -LB  --  therapy note (daily note)  -RD (r) MT (t) RD (c)     Subjective Information  no complaints  (Pended)   -LB  --  (Pended)   -LB  no complaints  -RD  no complaints  -RD (r) MT (t) RD (c)     Patient Observations  alert;cooperative;agree to therapy  (Pended)   -LB  --  (Pended)   -LB  alert;cooperative;agree to therapy  -RD  alert;cooperative;agree to therapy  -RD (r) MT (t) RD (c)     Patient/Family/Caregiver Comments/Observations  --  --  No family present  -RD  No family present   -RD (r) MT (t) RD (c)     Care Plan Review  --  --  --  evaluation/treatment results reviewed;care plan/treatment goals reviewed;risks/benefits reviewed;current/potential barriers reviewed;patient/other agree to care plan  -RD (r) MT (t) RD (c)     Patient Effort  good  (Pended)   -LB  --  (Pended)   -LB  good  -RD  good  -RD (r) MT (t) RD (c)        General Information    Patient Profile Reviewed  yes  (Pended)   -LB  --  (Pended)   -LB  yes  -RD  --     Pertinent History Of Current Problem  --  --  Adm w/ R facial droop, dysarthia/dysphagia, L leg wkns. Hx of dementia, prior L frontal stroke. Per neuro pt w/ suspected R frontal stroke as well as possible exacerbation of chronic R pontine infarct. Hx of CAD, GERD, HTN. MBS today to r/o pharyngeal dysphagia.   -RD  --     Current Method of Nutrition  --  --  NPO  -RD  --     Precautions/Limitations, Vision  --  --  WFL with corrective lenses;for purposes of eval  -RD  --     Precautions/Limitations, Hearing  other (see comments)  (Pended)  Pt had difficulties hearing at times.   -LB  --  (Pended)  Pt had difficulties hearing at times.   -LB  WFL;for purposes of eval  -RD  --     Prior Level of Function-Communication  --  --  cognitive-linguistic impairment;other (see comments) prior strokes, hx dementia  -RD  --     Prior Level of Function-Swallowing   --  --  unknown  -RD  --     Plans/Goals Discussed with  --  --  patient;agreed upon  -RD  --     Barriers to Rehab  hearing deficit  (Pended)   -LB  --  (Pended)   -LB  previous functional deficit  -RD  --     Patient's Goals for Discharge  --  --  patient did not state  -RD  --        Pain    Additional Documentation  --  --  (Pended)   -LB  Pain Scale: FACES Pre/Post-Treatment (Group)  -RD  Pain Scale: FACES Pre/Post-Treatment (Group)  -RD (r) MT (t) RD (c)        Pain Scale: Numbers Pre/Post-Treatment    Pretreatment Pain Rating  0/10 - no pain  (Pended)   -LB  --  (Pended)   -LB  --  --     Posttreatment Pain Rating  0/10 - no pain  (Pended)   -LB  --  (Pended)   -LB  --  --        Pain Scale: FACES Pre/Post-Treatment    Pain: FACES Scale, Pretreatment  0-->no hurt  (Pended)   -LB  --  0-->no hurt  -RD  0-->no hurt  -RD (r) MT (t) RD (c)     Posttreatment Pain Rating  0-->no hurt  (Pended)   -LB  --  0-->no hurt  -RD  0-->no hurt  -RD (r) MT (t) RD (c)        Oral Musculature and Cranial Nerve Assessment    Oral Motor General Assessment  generalized oral motor weakness;oral labial or buccal impairment  (Pended)   -LB  --  --  --     Oral Labial or Buccal Impairment, Detail, Cranial Nerve VII (Facial):  right labial droop;reduced ROM;reduced strength bilaterally  (Pended)   -LB  --  --  --        MBS/VFSS    Utensils Used  --  --  spoon;cup;straw  -RD  --     Consistencies Trialed  --  --  thin liquids;nectar/syrup-thick liquids;pudding thick;regular textures  -RD  --        MBS/VFSS Interpretation    Oral Prep Phase  --  --  impaired oral phase of swallowing  -RD  --     Oral Transit Phase  --  --  impaired  -RD  --     Oral Residue  --  --  impaired  -RD  --     Oral Phase, Comment  --  --  Moderate-severe oral dysphagia. Significantly delayed oral initiation, prep, & transit. Manipulation appears adquate for pureed/pudding consistency at this time.   -RD  --        Oral Preparatory Phase    Oral Preparatory  Phase  --  --  reduced lip closure around utensil;anterior loss;prolonged manipulation;inadequate manipulation  -RD  --     Reduced Lip Closure Around Utensil  --  --  all consistencies tested;secondary to reduced labial seal  -RD  --     Anterior Loss  --  --  thin liquids;secondary to reduced labial seal  -RD  --     Prolonged Manipulation  --  --  all consistencies tested;secondary to reduced lingual strength;secondary to reduced lingual range of motion  -RD  --     Inadequate Manipulation  --  --  regular textures;secondary to reduced lingual strength;secondary to reduced lingual range of motion;other (see comments) significantly prolonged prep w/ unchewed pieces  -RD  --     Oral Preparatory Phase, Comment  --  --  Pt able to pull thin liquid via straw, but it was uncontrolled & inconsistent & contributed to amount of pre-spill when using.   -RD  --        Oral Transit Phase    Impaired Oral Transit Phase  --  --  delayed initiation of bolus transit;increased A-P transit time;premature spillage of liquids into pharynx  -RD  --     Delayed Initiation of bolus transit  --  --  all consistencies tested;secondary to impaired cognitive status  -RD  --     Increased A-P Transit Time  --  --  all consistencies tested;secondary to reduced lingual control  -RD  --     Premature Spillage of Liquids into Pharynx  --  --  thin liquids;secondary to reduced lingual control  -RD  --        Oral Residue    Impaired Oral Residue  --  --  diffuse residue throughout oral cavity;lingual residue;lateral sulci residue  -RD  --     Lingual Residue  --  --  all consistencies tested;secondary to reduced lingual strength;secondary to reduced lingual range of motion  -RD  --     Lateral Sulci Residue  --  --  all consistencies tested;secondary to reduced lingual strength;secondary to reduced lingual range of motion  -RD  --     Diffuse Residue throughout Oral Cavity  --  --  all consistencies tested;secondary to reduced lingual  strength;secondary to reduced lingual range of motion  -RD  --     Response to Oral Residue  --  --  unable to clear residue  -RD  --     Oral Residue, Comment  --  --  Moderate oral residue w/ pudding, moderate-severe w/ solids, otherwise mild diffuse oral residue w/ liquids.   -RD  --        Initiation of Pharyngeal Swallow    Initiation of Pharyngeal Swallow  --  --  bolus in pyriform sinuses  -RD  --     Pharyngeal Phase  --  --  impaired pharyngeal phase of swallowing  -RD  --     Penetration During the Swallow  --  --  thin liquids;nectar-thick liquids;secondary to delayed swallow initiation or mistiming;secondary to reduced laryngeal elevation;secondary to reduced vestibular closure  -RD  --     Aspiration During the Swallow  --  --  thin liquids;secondary to delayed swallow initiation or mistiming;secondary to reduced laryngeal elevation;secondary to reduced vestibular closure  -RD  --     Aspiration After the Swallow  --  --  thin liquids;secondary to residue;in laryngeal vestibule  -RD  --     Response to Penetration  --  --  no response  -RD  --     Response to Aspiration  --  --  no response, silent aspiration;response with cue only;could not clear subglottic material  -RD  --     Rosenbek's Scale  --  --  thin:;8--->level 8;nectar:;2--->level 2;pudding/puree:;regular textures:;1--->level 1  -RD  --     Pharyngeal Residue  --  --  all consistencies tested;diffuse within pharynx;secondary to reduced base of tongue retraction;secondary to reduced posterior pharyngeal wall stripping;secondary to reduced laryngeal elevation;secondary to reduced hyolaryngeal excursion;other (see comments) mild diffuse pharyngeal residue  -RD  --     Response to Residue  --  --  unable to clear residue  -RD  --     Attempted Compensatory Maneuvers  --  --  bolus size;bolus presentation style;other (see comments) compensations difficult 2' comprehension/cognition  -RD  --     Response to Attempted Compensatory Maneuvers  --  --   prevented aspiration;other (see comments) w/ small single tsp/cup sips of nectar-thick  -RD  --     Pharyngeal Phase, Comment  --  --  Mild-moderate pharyngeal dysphagia. Initially no aspiration, but pt quickly fatigued. Penetration/aspiration during the swallow w/ thin liquids 2' pre-spill, significantly delayed initiation, and reduced vestibular closure. Pt continued to aspirate thin vestibular residue t/o exam w/o sensation. All aspiration was silent. Cued cough was weak & did not clear subglottic material. No penetration w/ nectar-thick via tsp. Minimal penetration during the swallow w/ nectar-thick via cup, which appeared to clear upon completion of the swallow. DNT straw w/ nectar-thick given oral control issues noted w/ thin straw bolus. No aspiration observed w/ small single tsp/cup sips of nectar-thick liquids or pudding consistencies. Mild diffuse pharyngeal residue w/ all trials 2' reduced BOT retraction, decr'd pharyngeal stripping wave, reduced elevation, and decr'd excursion. Residue did not appear to build, although thin vestibular residue remained upon completion of exam. Pt unable to adequately follow compensations 2' comprehension/cog deficits & initiation difficulty.   -RD  --        Esophageal Phase    Esophageal Phase  --  --  other (see comments)  -RD  --     Esophageal Phase, Comment  --  --  No immediate retrograde flow observed, although shoulder obstructing full view of esophageal phase.   -RD  --        Clinical Impression    Daily Summary of Progress (SLP)  --  --  --  progress toward functional goals as expected  -RD (r) MT (t) RD (c)     SLP Swallowing Diagnosis  --  --  mod-severe;oral dysphagia;mild-moderate;pharyngeal dysphagia  -RD  oral dysphagia;suspected pharyngeal dysphagia  -RD (r) MT (t) RD (c)     Functional Impact  --  --  risk of aspiration/pneumonia  -RD  risk of aspiration/pneumonia  -RD (r) MT (t) RD (c)     Rehab Potential/Prognosis, Swallowing  --  --  good, to  achieve stated therapy goals  -RD  good, to achieve stated therapy goals  -RD (r) MT (t) RD (c)     Swallow Criteria for Skilled Therapeutic Interventions Met  --  --  demonstrates skilled criteria  -RD  demonstrates skilled criteria  -RD (r) MT (t) RD (c)     Plan for Continued Treatment (SLP)  --  --  --  Overt s/sxs of aspiration characterized by multiple swallows w/ large boluses of nectar thick liquids and incosistent wet vocal quality following thins. MBS warranted to r/o pharyngeal dysphagia. Pt agreeable. Continue to address dysarthria and aphasia with treatment.   -RD (r) MT (t) RD (c)        Recommendations    Therapy Frequency (Swallow)  --  --  5 days per week  -RD  5 days per week  -RD (r) MT (t) RD (c)     Predicted Duration Therapy Intervention (Days)  until discharge  (Pended)   -LB  --  until discharge  -RD  until discharge  -RD (r) MT (t) RD (c)     SLP Diet Recommendation  --  --  puree;nectar thick liquids  -RD  puree;nectar thick liquids  -RD (r) MT (t) RD (c)     Recommended Diagnostics  --  --  --  VFSS (MBS)  -RD (r) MT (t) RD (c)     Recommended Precautions and Strategies  --  --  upright posture during/after eating;small bites of food and sips of liquid;no straw;check mouth frequently for oral residue/pocketing;general aspiration precautions;reflux precautions;1:1 supervision  -RD  upright posture during/after eating;small bites of food and sips of liquid;general aspiration precautions;fatigue precautions;assist with feeding  -RD (r) MT (t) RD (c)     Oral Care Recommendations  --  --  Oral Care BID/PRN  -RD  Oral Care BID/PRN  -RD (r) MT (t) RD (c)     SLP Rec. for Method of Medication Administration  --  --  meds crushed;with pudding or applesauce;as tolerated  -RD  meds crushed;with pudding or applesauce;as tolerated  -RD (r) MT (t) RD (c)     Monitor for Signs of Aspiration  --  --  yes;notify SLP if any concerns  -RD  yes;notify SLP if any concerns  -RD (r) MT (t) RD (c)      Anticipated Discharge Disposition (SLP)  unknown;anticipate therapy at next level of care  (Pended)   -LB  --  unknown;anticipate therapy at next level of care  -RD  skilled nursing facility;anticipate therapy at next level of care  -RD (r) MT (t) RD (c)       User Key  (r) = Recorded By, (t) = Taken By, (c) = Cosigned By    Initials Name Effective Dates    RD Wilberjuancolin Nell SHAKA, MS CCC-SLP 04/03/18 -     Kelsey Arroyo, Speech Therapy Student 01/14/21 -     Chuckie Pérez, Speech Therapy Student 03/09/21 -           EDUCATION  The patient has been educated in the following areas:   Dysphagia (Swallowing Impairment).    SLP Recommendation and Plan                          Anticipated Discharge Disposition (SLP): (P) unknown, anticipate therapy at next level of care        Predicted Duration Therapy Intervention (Days): (P) until discharge     Daily Summary of Progress (SLP): (P) progress towards functional goals is fair    Plan for Continued Treatment (SLP): (P) Continue to address cognitive and speech goals.              Plan of Care Reviewed With: (P) patient  Progress: (P) improving    SLP GOALS     Row Name 04/01/21 1315 04/01/21 0115 03/31/21 1530       Oral Nutrition/Hydration Goal 1 (SLP)    Oral Nutrition/Hydration Goal 1, SLP  LTG: Pt will demonstrate functional swallow for regular/thin diet with use of compensatory strategies as needed.  (Pended)   -LB  --  LTG: Pt will demonstrate functional swallow for regular/thin diet with use of compensatory strategies as needed.  -CH    Time Frame (Oral Nutrition/Hydration Goal 1, SLP)  by discharge  (Pended)   -LB  --  by discharge  -CH    Barriers (Oral Nutrition/Hydration Goal 1, SLP)  --  --  inconsistent WVQ w/ thin   -CH    Progress/Outcomes (Oral Nutrition/Hydration Goal 1, SLP)  continuing progress toward goal  (Pended)   -LB  --  continuing progress toward goal  -CH       Oral Nutrition/Hydration Goal 2 (SLP)    Oral Nutrition/Hydration Goal 2, SLP   Pt will tolerate current diet of pureed solids and nectar-thick liquids with no s/sx aspiration with 100% accuracy.  (Pended)   -LB  --  Pt will tolerate current diet of pureed solids and nectar-thick liquids with no s/sx aspiration with 100% accuracy.  -CH    Time Frame (Oral Nutrition/Hydration Goal 2, SLP)  short term goal (STG)  (Pended)   -LB  --  short term goal (STG)  -CH    Barriers (Oral Nutrition/Hydration Goal 2, SLP)  Pt tolerated 4 trials of applesauce w/o s/sx of aspiration.  (Pended)   -LB  --  Multiple swallows w/ larger bolus, bilateral buccal residue swabbed out. Patient unable to follow command to clear using lingual sweep.   -CH    Progress/Outcomes (Oral Nutrition/Hydration Goal 2, SLP)  goal ongoing;continuing progress toward goal  (Pended)   -LB  --  continuing progress toward goal  -CH       Oral Nutrition/Hydration Goal (SLP)    Oral Nutrition/Hydration Goal, SLP  Pt will accept trials of thin liquids with no s/sx aspiration w/ 60% accuracy to determine readiness of diet upgrade.  (Pended)   -LB  --  Pt will accept trials of thin liquids with no s/sx aspiration w/ 60% accuracy to determine readiness of diet upgrade.  -CH    Time Frame (Oral Nutrition/Hydration Goal, SLP)  short term goal (STG)  (Pended)   -LB  --  short term goal (STG)  -CH    Barriers (Oral Nutrition/Hydration Goal, SLP)  no s/sx of aspiration  (Pended)   -LB  --  silent aspiration  -CH    Progress/Outcomes (Oral Nutrition/Hydration Goal, SLP)  continuing progress toward goal  (Pended)   -LB  --  continuing progress toward goal  -CH       Labial Strengthening Goal 1 (SLP)    Activity (Labial Strengthening Goal 1, SLP)  increase labial tone;increase labial sensation/afferent drive  (Pended)   -LB  --  increase labial tone;increase labial sensation/afferent drive  -CH    Increase Labial Tone  labial resistance exercises  (Pended)   -LB  --  labial resistance exercises  -    Increase Labial Sensation/Afferent Drive  swallow  trials  (Pended)   -LB  --  swallow trials  -CH    Refugio/Accuracy (Labial Strengthening Goal 1, SLP)  with moderate cues (50-74% accuracy)  (Pended)   -LB  --  with moderate cues (50-74% accuracy)  -CH    Time Frame (Labial Strengthening Goal 1, SLP)  short term goal (STG)  (Pended)   -LB  --  short term goal (STG)  -CH    Barriers (Labial Strengthening Goal 1, SLP)  --  --  Patient completed all exercises x 5 with good effort, min cues.   -CH    Progress/Outcomes (Labial Strengthening Goal 1, SLP)  goal ongoing  (Pended)   -LB  --  continuing progress toward goal  -CH       Lingual Strengthening Goal 1 (SLP)    Activity (Lingual Strengthening Goal 1, SLP)  increase lingual tone/sensation/control/coordination/movement;increase tongue back strength  (Pended)   -LB  --  increase lingual tone/sensation/control/coordination/movement;increase tongue back strength  -    Increase Lingual Tone/Sensation/Control/Coordination/Movement  lingual movement exercises;lingual resistance exercises  (Pended)   -LB  --  lingual movement exercises;lingual resistance exercises  -    Increase Tongue Back Strength  lingual resistance exercises  (Pended)   -LB  --  lingual resistance exercises  -    Refugio/Accuracy (Lingual Strengthening Goal 1, SLP)  with moderate cues (50-74% accuracy)  (Pended)   -LB  --  with moderate cues (50-74% accuracy)  -    Time Frame (Lingual Strengthening Goal 1, SLP)  short term goal (STG)  (Pended)   -LB  --  short term goal (STG)  -CH    Barriers (Lingual Strengthening Goal 1, SLP)  Pt completed all exercises x3 with good effort and moderate cues. Pt exhibited general tongue weakness and coordination.  (Pended)   -LB  --  Patient completed all exercises x 5 with good effort, min cues.   -CH    Progress/Outcomes (Lingual Strengthening Goal 1, SLP)  progress slower than expected  (Pended)   -LB  --  continuing progress toward goal  -CH       Pharyngeal Strengthening Exercise Goal 1 (SLP)     Activity (Pharyngeal Strengthening Goal 1, SLP)  increase timing;increase superior movement of the hyolaryngeal complex;increase anterior movement of the hyolaryngeal complex;increase closure at entrance to airway/closure of airway at glottis;increase squeeze/positive pressure generation;increase tongue base retraction  (Pended)   -LB  --  increase timing;increase superior movement of the hyolaryngeal complex;increase anterior movement of the hyolaryngeal complex;increase closure at entrance to airway/closure of airway at glottis;increase squeeze/positive pressure generation;increase tongue base retraction  -CH    Increase Timing  prepping - 3 second prep or suck swallow or 3-step swallow  (Pended)   -LB  --  prepping - 3 second prep or suck swallow or 3-step swallow  -CH    Increase Superior Movement of the Hyolaryngeal Complex  falsetto  (Pended)   -LB  --  falsetto  -CH    Increase Anterior Movement of the Hyolaryngeal Complex  chin tuck against resistance (CTAR)  (Pended)   -LB  --  chin tuck against resistance (CTAR)  -CH    Increase Closure at Entrance to Airway/Closure of Airway at Glottis  super-supraglottic swallow  (Pended)   -LB  --  super-supraglottic swallow  -CH    Increase Squeeze/Positive Pressure Generation  effortful pitch glide (falsetto + pharyngeal squeeze)  (Pended)   -LB  --  effortful pitch glide (falsetto + pharyngeal squeeze)  -CH    Increase Tongue Base Retraction  hard effortful swallow  (Pended)   -LB  --  hard effortful swallow  -CH    Ransom/Accuracy (Pharyngeal Strengthening Goal 1, SLP)  with moderate cues (50-74% accuracy)  (Pended)   -LB  --  with moderate cues (50-74% accuracy)  -CH    Time Frame (Pharyngeal Strengthening Goal 1, SLP)  short term goal (STG)  (Pended)   -LB  --  short term goal (STG)  -CH    Barriers (Pharyngeal Strengthening Goal 1, SLP)  Pt completed 3 trials of all exercises with moderate cues.   (Pended)   -LB  --  Patient completed all exercises x 5  with good effort, min cues.   -CH    Progress/Outcomes (Pharyngeal Strengthening Goal 1, SLP)  continuing progress toward goal  (Pended)   -LB  --  continuing progress toward goal  -CH       Swallow Compensatory Strategies Goal 1 (SLP)    Activity (Swallow Compensatory Strategies/Techniques Goal 1, SLP)  compensatory strategies;postural techniques;small bites;small cup sips  (Pended)   -LB  --  compensatory strategies;postural techniques;small bites;small cup sips  -CH    [place]/Accuracy (Swallow Compensatory Strategies/Techniques Goal 1, SLP)  with minimal cues (75-90% accuracy)  (Pended)   -LB  --  with minimal cues (75-90% accuracy)  -    Time Frame (Swallow Compensatory Strategies/Techniques Goal 1, SLP)  short term goal (STG)  (Pended)   -LB  --  short term goal (STG)  -CH    Barriers (Swallow Compensatory Strategies/Techniques Goal 1, SLP)  --  --  Min cues for sm bites/sips required.   -CH    Progress/Outcomes (Swallow Compensatory Strategies/Techniques Goal 1, SLP)  goal ongoing  (Pended)   -LB  --  --       Words/Phrases/Sentences Goal 1 (SLP)    Improve Ability to Comprehend Words/Phrases/Sentences Through: Goal 1 (SLP)  identify body part;identify familiar objects;80%;with minimal cues (75-90%)  (Pended)   -LB  --  (Pended)   -LB  identify body part;identify familiar objects;80%;with minimal cues (75-90%)  -CH    Time Frame (Identify Objects and Pictures Goal 1, SLP)  short term goal (STG)  (Pended)   -LB  --  (Pended)   -LB  short term goal (STG)  -CH    Progress (Ability to Contruct Words/Phrases/Sentences Goal 1, SLP)  50%;with moderate cues (50-74%)  (Pended)   -LB  --  (Pended)   -LB  80%;with minimal cues (75-90%)  -CH    Progress/Outcomes (Identify Objects and Pictures Goal 1, SLP)  progress slower than expected  (Pended)   -LB  --  (Pended)   -LB  continuing progress toward goal  -CH    Comment (Words/Phrases/Sentences Goal 1, SLP)  Identify objects around room  (Pended)   -LB  --   (Pended)   -LB  --       Comprehend Questions Goal 1 (SLP)    Improve Ability to Comprehend Questions Goal 1 (SLP)  simple yes/no questions;80%;with minimal cues (75-90%)  (Pended)   -LB  --  (Pended)   -LB  simple yes/no questions;80%;with minimal cues (75-90%)  -CH    Time Frame (Comprehend Questions Goal 1, SLP)  short term goal (STG)  (Pended)   -LB  --  (Pended)   -LB  short term goal (STG)  -CH    Progress (Ability to Comprehend Questions Goal 1, SLP)  --  --  80%;with minimal cues (75-90%)  -CH    Progress/Outcomes (Comprehend Questions Goal 1, SLP)  goal ongoing  (Pended)   -LB  --  (Pended)   -LB  continuing progress toward goal  -CH       Follow Directions Goal 2 (SLP)    Improve Ability to Follow Directions Goal 1 (SLP)  1 step direction without objects;with minimal cues (75-90%);2 step commands;with moderate cues (50-74%);80%  (Pended)   -LB  --  (Pended)   -LB  1 step direction without objects;with minimal cues (75-90%);2 step commands;with moderate cues (50-74%);80%  -CH    Time Frame (Follow Directions Goal 1, SLP)  short term goal (STG)  (Pended)   -LB  --  (Pended)   -LB  short term goal (STG)  -CH    Progress (Ability to Follow Directions Goal 1, SLP)  50%;with moderate cues (50-74%)  (Pended)   -LB  --  (Pended)   -LB  60%;with minimal cues (75-90%)  -CH    Progress/Outcomes (Follow Directions Goal 1, SLP)  progress slower than expected  (Pended)   -LB  --  (Pended)   -LB  continuing progress toward goal  -CH       Word Retrieval Skills Goal 1 (SLP)    Improve Word Retrieval Skills By Goal 1 (SLP)  confrontational naming task;responsive naming task;repeating words;completing open ended structured sentence;80%;with minimal cues (75-90%)  (Pended)   -LB  --  (Pended)   -LB  confrontational naming task;responsive naming task;repeating words;completing open ended structured sentence;80%;with minimal cues (75-90%)  -CH    Time Frame (Word Retrieval Goal 1, SLP)  short term goal (STG)  (Pended)   -LB   --  (Pended)   -LB  short term goal (STG)  -CH    Progress (Word Retrieval Skills Goal 1, SLP)  70%;with minimal cues (75-90%)  (Pended)   -LB  --  (Pended)   -LB  80%;with minimal cues (75-90%)  -CH    Progress/Outcomes (Word Retrieval Goal 1, SLP)  continuing progress toward goal  (Pended)   -LB  --  (Pended)   -LB  continuing progress toward goal  -CH    Comment (Word Retrieval Goal 1, SLP)  completing open ended sentence.   (Pended)   -LB  --  (Pended)   -LB  confrontational naming, responsive naming  -CH       Phonation Goal 1 (SLP)    Improve Phonation By Goal 1 (SLP)  using loud speech;80%;with minimal cues (75-90%)  (Pended)   -LB  --  (Pended)   -LB  using loud speech;80%;with minimal cues (75-90%)  -CH    Time Frame (Phonation Goal 1, SLP)  short term goal (STG)  (Pended)   -LB  --  (Pended)   -LB  short term goal (STG)  -CH    Progress (Phonation Goal 1, SLP)  70%;with moderate cues (50-74%)  (Pended)   -LB  --  (Pended)   -LB  70%;with minimal cues (75-90%)  -CH    Progress/Outcomes (Phonation Goal 1, SLP)  continuing progress toward goal  (Pended)   -LB  --  (Pended)   -LB  continuing progress toward goal  -CH    Comment (Phonation Goal 1, SLP)  At times had loud speech without cueing which greatly improved intellligibility.  (Pended)   -LB  --  (Pended)   -LB  --       Articulation Goal 1 (SLP)    Improve Articulation Goal 1 (SLP)  by over-articulating at word level;80%;with moderate cues (50-74%)  (Pended)   -LB  --  (Pended)   -LB  by over-articulating at word level;80%;with moderate cues (50-74%)  -CH    Time Frame (Articulation Goal 1, SLP)  short term goal (STG)  (Pended)   -LB  --  (Pended)   -LB  short term goal (STG)  -CH    Progress (Articulation Goal 1, SLP)  50%;with maximum cues (25-49%)  (Pended)   -LB  --  (Pended)   -LB  50%;with moderate cues (50-74%)  -CH    Progress/Outcomes (Articulation Goal 1, SLP)  progress slower than expected  (Pended)   -LB  --  (Pended)   -LB  continuing  progress toward goal  -CH    Comment (Articulation Goal 1, SLP)  Required cueing   (Pended)   -LB  --  (Pended)   -LB  Required cueing   -CH       Additional Goal 1 (SLP)    Additional Goal 1, SLP  LTG: Pt will improve communication skills to actively participate in care w/ 80% accuracy and min cues.   (Pended)   -LB  --  (Pended)   -LB  LTG: Pt will improve communication skills to actively participate in care w/ 80% accuracy and min cues.   -CH    Time Frame (Additional Goal 1, SLP)  by discharge  (Pended)   -LB  --  (Pended)   -LB  by discharge  -CH    Progress/Outcomes (Additional Goal 1, SLP)  progress slower than expected  (Pended)   -LB  --  (Pended)   -LB  continuing progress toward goal  -CH    Row Name 03/30/21 1145 03/29/21 1620          Oral Nutrition/Hydration Goal 1 (SLP)    Oral Nutrition/Hydration Goal 1, SLP  LTG: Pt will demonstrate functional swallow for regular/thin diet with use of compensatory strategies as needed.  -RD  LTG: Pt will demonstrate functional swallow for regular/thin diet with use of compensatory strategies as needed.  -RD (r) MT (t) RD (c)     Time Frame (Oral Nutrition/Hydration Goal 1, SLP)  by discharge  -RD  by discharge  -RD (r) MT (t) RD (c)     Barriers (Oral Nutrition/Hydration Goal 1, SLP)  --  inconsistent WVQ w/ thin   -RD (r) MT (t) RD (c)     Progress/Outcomes (Oral Nutrition/Hydration Goal 1, SLP)  goal ongoing  -RD  continuing progress toward goal  -RD (r) MT (t) RD (c)        Oral Nutrition/Hydration Goal 2 (SLP)    Oral Nutrition/Hydration Goal 2, SLP  Pt will tolerate current diet of pureed solids and nectar-thick liquids with no s/sx aspiration with 100% accuracy.  -RD  Pt will tolerate current diet of pureed solids and nectar-thick liquids with no s/sx aspiration with 100% accuracy.  -RD (r) MT (t) RD (c)     Time Frame (Oral Nutrition/Hydration Goal 2, SLP)  short term goal (STG)  -RD  short term goal (STG)  -RD (r) MT (t) RD (c)     Barriers (Oral  Nutrition/Hydration Goal 2, SLP)  --  Multiple swallows w/ larger bolus   -RD (r) MT (t) RD (c)     Progress/Outcomes (Oral Nutrition/Hydration Goal 2, SLP)  goal ongoing  -RD  progress slower than expected  -RD (r) MT (t) RD (c)        Oral Nutrition/Hydration Goal (SLP)    Oral Nutrition/Hydration Goal, SLP  Pt will accept trials of thin liquids with no s/sx aspiration w/ 60% accuracy to determine readiness of diet upgrade.  -RD  Pt will accept trials of thin liquids and soft solids with no s/sx aspiration to determine readiness of diet upgrade.  -RD (r) MT (t) RD (c)     Time Frame (Oral Nutrition/Hydration Goal, SLP)  short term goal (STG)  -RD  short term goal (STG)  -RD (r) MT (t) RD (c)     Barriers (Oral Nutrition/Hydration Goal, SLP)  silent aspiration  -RD  Inconsistent WVQ w/ thin   -RD (r) MT (t) RD (c)     Progress/Outcomes (Oral Nutrition/Hydration Goal, SLP)  goal revised this date;goal ongoing  -RD  progress slower than expected  -RD (r) MT (t) RD (c)        Labial Strengthening Goal 1 (SLP)    Activity (Labial Strengthening Goal 1, SLP)  increase labial tone;increase labial sensation/afferent drive  -RD  --     Increase Labial Tone  labial resistance exercises  -RD  --     Increase Labial Sensation/Afferent Drive  swallow trials  -RD  --     [place]aines/Accuracy (Labial Strengthening Goal 1, SLP)  with moderate cues (50-74% accuracy)  -RD  --     Time Frame (Labial Strengthening Goal 1, SLP)  short term goal (STG)  -RD  --        Lingual Strengthening Goal 1 (SLP)    Activity (Lingual Strengthening Goal 1, SLP)  increase lingual tone/sensation/control/coordination/movement;increase tongue back strength  -RD  --     Increase Lingual Tone/Sensation/Control/Coordination/Movement  lingual movement exercises;lingual resistance exercises  -RD  --     Increase Tongue Back Strength  lingual resistance exercises  -RD  --     [place]aines/Accuracy (Lingual Strengthening Goal 1, SLP)  with moderate cues  (50-74% accuracy)  -RD  --     Time Frame (Lingual Strengthening Goal 1, SLP)  short term goal (STG)  -RD  --        Pharyngeal Strengthening Exercise Goal 1 (SLP)    Activity (Pharyngeal Strengthening Goal 1, SLP)  increase timing;increase superior movement of the hyolaryngeal complex;increase anterior movement of the hyolaryngeal complex;increase closure at entrance to airway/closure of airway at glottis;increase squeeze/positive pressure generation;increase tongue base retraction  -RD  --     Increase Timing  prepping - 3 second prep or suck swallow or 3-step swallow  -RD  --     Increase Superior Movement of the Hyolaryngeal Complex  falsetto  -RD  --     Increase Anterior Movement of the Hyolaryngeal Complex  chin tuck against resistance (CTAR)  -RD  --     Increase Closure at Entrance to Airway/Closure of Airway at Glottis  super-supraglottic swallow  -RD  --     Increase Squeeze/Positive Pressure Generation  effortful pitch glide (falsetto + pharyngeal squeeze)  -RD  --     Increase Tongue Base Retraction  hard effortful swallow  -RD  --     Baker/Accuracy (Pharyngeal Strengthening Goal 1, SLP)  with moderate cues (50-74% accuracy)  -RD  --     Time Frame (Pharyngeal Strengthening Goal 1, SLP)  short term goal (STG)  -RD  --        Swallow Compensatory Strategies Goal 1 (SLP)    Activity (Swallow Compensatory Strategies/Techniques Goal 1, SLP)  compensatory strategies;postural techniques;small bites;small cup sips  -RD  --     Baker/Accuracy (Swallow Compensatory Strategies/Techniques Goal 1, SLP)  with minimal cues (75-90% accuracy)  -RD  --     Time Frame (Swallow Compensatory Strategies/Techniques Goal 1, SLP)  short term goal (STG)  -RD  --        Words/Phrases/Sentences Goal 1 (SLP)    Improve Ability to Comprehend Words/Phrases/Sentences Through: Goal 1 (SLP)  identify body part;identify familiar objects;80%;with minimal cues (75-90%)  -RD  identify body part;identify familiar  objects;80%;with minimal cues (75-90%)  -RD (r) MT (t) RD (c)     Time Frame (Identify Objects and Pictures Goal 1, SLP)  short term goal (STG)  -RD  short term goal (STG)  -RD (r) MT (t) RD (c)     Progress (Ability to Contruct Words/Phrases/Sentences Goal 1, SLP)  50%;with minimal cues (75-90%)  -RD  --     Progress/Outcomes (Identify Objects and Pictures Goal 1, SLP)  continuing progress toward goal  -RD  goal ongoing  -RD (r) MT (t) RD (c)        Comprehend Questions Goal 1 (SLP)    Improve Ability to Comprehend Questions Goal 1 (SLP)  simple yes/no questions;80%;with minimal cues (75-90%)  -RD  simple yes/no questions;80%;with minimal cues (75-90%)  -RD (r) MT (t) RD (c)     Time Frame (Comprehend Questions Goal 1, SLP)  short term goal (STG)  -RD  short term goal (STG)  -RD (r) MT (t) RD (c)     Progress (Ability to Comprehend Questions Goal 1, SLP)  70%;with minimal cues (75-90%)  -RD  70%;with minimal cues (75-90%)  -RD (r) MT (t) RD (c)     Progress/Outcomes (Comprehend Questions Goal 1, SLP)  continuing progress toward goal  -RD  continuing progress toward goal  -RD (r) MT (t) RD (c)        Follow Directions Goal 2 (SLP)    Improve Ability to Follow Directions Goal 1 (SLP)  1 step direction without objects;with minimal cues (75-90%);2 step commands;with moderate cues (50-74%);80%  -RD  1 step direction without objects;with minimal cues (75-90%);2 step commands;with moderate cues (50-74%);80%  -RD (r) MT (t) RD (c)     Time Frame (Follow Directions Goal 1, SLP)  short term goal (STG)  -RD  short term goal (STG)  -RD (r) MT (t) RD (c)     Progress (Ability to Follow Directions Goal 1, SLP)  60%;with minimal cues (75-90%)  -RD  --     Progress/Outcomes (Follow Directions Goal 1, SLP)  continuing progress toward goal  -RD  goal ongoing  -RD (r) MT (t) RD (c)        Word Retrieval Skills Goal 1 (SLP)    Improve Word Retrieval Skills By Goal 1 (SLP)  confrontational naming task;responsive naming task;repeating  words;completing open ended structured sentence;80%;with minimal cues (75-90%)  -RD  confrontational naming task;responsive naming task;repeating words;completing open ended structured sentence;80%;with minimal cues (75-90%)  -RD (r) MT (t) RD (c)     Time Frame (Word Retrieval Goal 1, SLP)  short term goal (STG)  -RD  short term goal (STG)  -RD (r) MT (t) RD (c)     Progress (Word Retrieval Skills Goal 1, SLP)  60%;with minimal cues (75-90%)  -RD  --     Progress/Outcomes (Word Retrieval Goal 1, SLP)  continuing progress toward goal  -RD  goal ongoing  -RD (r) MT (t) RD (c)        Phonation Goal 1 (SLP)    Improve Phonation By Goal 1 (SLP)  using loud speech;80%;with minimal cues (75-90%)  -RD  using loud speech;80%;with minimal cues (75-90%)  -RD (r) MT (t) RD (c)     Time Frame (Phonation Goal 1, SLP)  short term goal (STG)  -RD  short term goal (STG)  -RD (r) MT (t) RD (c)     Progress (Phonation Goal 1, SLP)  50%;with moderate cues (50-74%)  -RD  30%;with moderate cues (50-74%)  -RD (r) MT (t) RD (c)     Progress/Outcomes (Phonation Goal 1, SLP)  continuing progress toward goal  -RD  continuing progress toward goal;goal revised this date  -RD (r) MT (t) RD (c)     Comment (Phonation Goal 1, SLP)  loud speech really improved intelligbility at word level  -RD  --        Articulation Goal 1 (SLP)    Improve Articulation Goal 1 (SLP)  by over-articulating at word level;80%;with moderate cues (50-74%)  -RD  by over-articulating at word level;80%;with moderate cues (50-74%)  -RD (r) MT (t) RD (c)     Time Frame (Articulation Goal 1, SLP)  short term goal (STG)  -RD  short term goal (STG)  -RD (r) MT (t) RD (c)     Progress (Articulation Goal 1, SLP)  30%;with moderate cues (50-74%)  -RD  30%;with moderate cues (50-74%)  -RD (r) MT (t) RD (c)     Progress/Outcomes (Articulation Goal 1, SLP)  continuing progress toward goal  -RD  continuing progress toward goal  -RD (r) MT (t) RD (c)     Comment (Articulation Goal 1,  SLP)  --  Required cueing   -RD (r) MT (t) RD (c)        Additional Goal 1 (SLP)    Additional Goal 1, SLP  LTG: Pt will improve communication skills to actively participate in care w/ 80% accuracy and min cues.   -RD  LTG: Pt will improve communication skills to actively participate in care w/ 80% accuracy and min cues.   -RD (r) MT (t) RD (c)     Time Frame (Additional Goal 1, SLP)  by discharge  -RD  by discharge  -RD (r) MT (t) RD (c)     Progress/Outcomes (Additional Goal 1, SLP)  continuing progress toward goal  -RD  --       User Key  (r) = Recorded By, (t) = Taken By, (c) = Cosigned By    Initials Name Provider Type    Nell Mandjuano, MS CCC-SLP Speech and Language Pathologist    [facility] Blanca Lenz, MS CCC-SLP Speech and Language Pathologist    Kelsey Arroyo, Speech Therapy Student Speech Therapy Student    Chuckie Pérez, Speech Therapy Student Speech Therapy Student             Time Calculation:   Time Calculation- SLP     Row Name 04/01/21 1446             Time Calculation- SLP    SLP Start Time  1315  (Pended)   -LB      SLP Received On  04/01/21  (Pended)   -LB        User Key  (r) = Recorded By, (t) = Taken By, (c) = Cosigned By    Initials Name Provider Type    Chuckie Pérez, Speech Therapy Student Speech Therapy Student          Therapy Charges for Today     Code Description Service Date Service Provider Modifiers Qty    [id number] HC ST TREATMENT SWALLOW 2 4/1/2021 Chuckie Hunt Speech Therapy Student GN 1    [id number] HC ST TREATMENT SPEECH 1 4/1/2021 Chuckie Hunt Speech Therapy Student GN 1            Patient was not wearing a face mask and did not exhibit coughing during this therapy encounter.  Procedure performed was not aerosolizing, involved close contact (within 6 feet for at least 15 minutes or longer), and did not involve contact with infectious secretions or specimens.  Therapist used appropriate personal protective equipment including gloves, standard  procedure mask and eye protection.  Appropriate PPE was worn during the entire therapy session.  Hand hygiene was completed before and after therapy session.   Chuckie Hunt, Speech Therapy Student  4/1/2021

## 2021-04-01 NOTE — PROGRESS NOTES
"                  Clinical Nutrition     Reason for Visit:   Follow-up protocol    Patient Name: Sebastian Winter  YOB: 1940  MRN: [id number]  Date of Encounter: 04/01/21 08:58 EDT  Admission date: 3/26/2021    Nutrition Assessment   Assessment     Admission diagnosis  Dysarthria  CVA    Additional diagnosis/conditions/procedures this admission  L-sided weakness    (3/27) SLP eval, rec -puree, nectar thick liquids  (3/29) SLP eval - puree, nectar thick liquids  (3/30) SLP MBS, rec- puree, nectar thick liquids    Additional PMH/procedures:  HF  GERD  Hypothyroid  Dementia  Depression    Joint replacement    Reported/Observed/Food/Nutrition Related History:     4/1: Patient continues to tolerate pureed diet well. Total feed. Per available PO data, consuming average of 50% of past meals. Patient reports eating well. Still full from breakfast. Denies any preferences.    3/29: Patient sitting in chair eating lunch at visit. No family present in room. Patient still with dysarthria but able to make out some of responses. Reports food was good today. RD observed patient ate ~50% at time of visit. He reports tolerating textures/consistencies well. Unsure if patient has tried Magic Cup supplement.    3/27: Noted results of SLP masoud done today and recs for pureed texture foods with nectar thick liquids.  RD spoke with pt in room; difficult to obtain info r/t po/wt hx given pt's dysarthria. RD attempted to call Aranza Nelson(Friend) contact listed in EMR, but no answer when I called.      Anthropometrics     Height: 175.3 cm (69\")  Last filed wt: Weight: 62.1 kg (137 lb) (03/26/21 1524)    BMI: BMI (Calculated): 20.2  Normal: 18.5-24.9kg/m2    Ideal Body Weight (IBW) (kg): 73.69  Admission wt: 137 lb  Method obtained: weight per charting 3/26      Labs reviewed     Results from last 7 days   Lab Units 03/29/21  0718 03/28/21  1431 03/28/21  0529 03/27/21  0730 03/26/21  1459   GLUCOSE mg/dL 99  --  81 87  --  "   BUN mg/dL 11  --  11 11  --    CREATININE mg/dL 1.22  --  1.09 1.24  --    SODIUM mmol/L 145  --  141 145  --    CHLORIDE mmol/L 109*  --  104 106  --    POTASSIUM mmol/L 3.8 3.5 3.1* 3.8  --    MAGNESIUM mg/dL  --  2.0  --   --   --    ALT (SGPT) U/L  --   --   --   --  12     Results from last 7 days   Lab Units 03/27/21  0730   CHOLESTEROL mg/dL 142   TRIGLYCERIDES mg/dL 109       Results from last 7 days   Lab Units 03/27/21  0626 03/27/21  0001 03/26/21  1455   GLUCOSE mg/dL 94 120 113     Lab Results   Lab Value Date/Time    HGBA1C 5.40 03/27/2021 0730       Medications reviewed   Pertinent: coreg, klonopin, aricept, fludrocortisone, synthroid, protonix      Intake/Output 24 hrs (7:00AM - 6:59 AM)     Intake & Output (last day)       03/31 0701 - 04/01 0700 04/01 0701 - 04/02 0700    P.O. 240     Total Intake(mL/kg) 240 (3.9)     Urine (mL/kg/hr) 250 (0.2)     Total Output 250     Net -10           Urine Unmeasured Occurrence  1 x    Stool Unmeasured Occurrence  1 x          Current Nutrition Prescription     PO: Diet Dysphagia; II - Pureed; Nectar / Syrup Thick; Other, No Straws; small sips; Cardiac  Dietary Nutrition Supplements: Magic Cup Daily  Intake: 50% x 5 meals      Nutrition Diagnosis     3/27 updated 3/29, 4/1  Problem Swallowing difficulty   Etiology Dysphagia   Signs/Symptoms SLP eval 3/27 - puree, NTL   Status: ongoing    3/29 updated 4/1  Problem Inadequate oral intake   Etiology Dysphagia, modified diet   Signs/Symptoms PO Intake (41% x 4 meals)   Status: ongoing    Nutrition Intervention   1.  Follow treatment progress, Care plan reviewed  2.  Advise alternate selection, Interview for preferences   3. Cont to send oral nutrition supplement - Magic Cup x2/day  4. Encouraged oral intake    Goal:   General: Nutrition support treatment  PO: Increase intake      Monitoring/Evaluation:   Per protocol, PO intake, Supplement intake, Pertinent labs, Weight, Symptoms      Veronika Vasquez RDN,  LD  Time Spent: 30 minutes

## 2021-04-01 NOTE — THERAPY TREATMENT NOTE
Patient Name: Sebastian Winter  : 1940    MRN: [id number]                              Today's Date: 2021       Admit Date: 3/26/2021    Visit Dx:     ICD-10-CM ICD-9-CM   1. Aphasia  R47.01 784.3   2. Acute left-sided weakness  R53.1 728.87   3. Oropharyngeal dysphagia  R13.12 787.22   4. Dysarthria  R47.1 784.51     Patient Active Problem List   Diagnosis   • Acute left-sided weakness   • Dysarthria   • Dementia without behavioral disturbance (CMS/HCC)   • Hypothyroidism (acquired)   • GERD without esophagitis   • Cardiomyopathy (CMS/HCC)   • Essential hypertension   • Dyslipidemia     Past Medical History:   Diagnosis Date   • Dementia (CMS/HCC)    • Depression    • GERD (gastroesophageal reflux disease)    • Hypothyroid      Past Surgical History:   Procedure Laterality Date   • JOINT REPLACEMENT       General Information     Row Name 21 1542          OT Time and Intention    Document Type  therapy note (daily note)  -CS     Mode of Treatment  occupational therapy  -CS     Row Name 21 1542          General Information    Patient Profile Reviewed  yes  -CS     Existing Precautions/Restrictions  fall  -CS     Barriers to Rehab  medically complex;previous functional deficit;cognitive status  -CS     Row Name 21 1542          Cognition    Orientation Status (Cognition)  oriented to;person;verbal cues/prompts needed for orientation;situation;place  -CS     Row Name 21 1542          Safety Issues, Functional Mobility    Safety Issues Affecting Function (Mobility)  ability to follow commands;awareness of need for assistance;insight into deficits/self-awareness;judgment;safety precaution awareness;problem-solving;safety precautions follow-through/compliance  -CS     Impairments Affecting Function (Mobility)  balance;cognition;endurance/activity tolerance;motor control;muscle tone abnormal;postural/trunk control;coordination;range of motion (ROM);strength  -CS     Cognitive Impairments,  Mobility Safety/Performance  awareness, need for assistance;insight into deficits/self-awareness;judgment;safety precaution awareness;safety precaution follow-through;sequencing abilities;problem-solving/reasoning  -CS       User Key  (r) = Recorded By, (t) = Taken By, (c) = Cosigned By    Initials Name Provider Type    Jj Sarah, OT Occupational Therapist          Mobility/ADL's     Row Name 04/01/21 1544          Bed Mobility    Comment (Bed Mobility)  Pt declined EOB/OOB activities  -     Row Name 04/01/21 1544          Transfers    Comment (Transfers)  Pt declined EOB/OOB activities  -     Row Name 04/01/21 1544          Functional Mobility    Functional Mobility- Comment  Pt declined EOB/OOB activities  -CS       User Key  (r) = Recorded By, (t) = Taken By, (c) = Cosigned By    Initials Name Provider Type    Jj Sarah OT Occupational Therapist        Obj/Interventions     [facility] Name 04/01/21 1547          Therapeutic Exercise    Therapeutic Exercise  shoulder;elbow/forearm;hand;wrist Pt tolerated BUE ther-ex on all planes for shoulder/elbow/wrist/hand.  -CS       User Key  (r) = Recorded By, (t) = Taken By, (c) = Cosigned By    Initials Name Provider Type    Jj Sarah, OT Occupational Therapist        Goals/Plan    No documentation.       Clinical Impression     [facility] Name 04/01/21 1548          Pain Assessment    Additional Documentation  Pain Scale: Numbers Pre/Post-Treatment (Group)  -CS     Row Name 04/01/21 1548          Pain Scale: Numbers Pre/Post-Treatment    Pretreatment Pain Rating  0/10 - no pain  -CS     Posttreatment Pain Rating  0/10 - no pain  -CS     Pain Intervention(s)  Repositioned;Ambulation/increased activity  -     Row Name 04/01/21 1543          Plan of Care Review    Plan of Care Reviewed With  patient  -CS     Progress  no change  -     Outcome Summary  Pt lethargic upon arrival, declined EOB/OOB activities, agreeable to supine ther-ex. Pt tolerated  ERIC ALFREDIGOR ther-ex (2/10reps) in supine. Reccommend d/c to IRF.  -     Row Name 04/01/21 1548          Therapy Plan Review/Discharge Plan (OT)    Anticipated Discharge Disposition (OT)  inpatient rehabilitation facility  -     Row Name 04/01/21 1548          Vital Signs    Pre Systolic BP Rehab  -- RN cleared for tx, VSS on RA  -CS     O2 Delivery Pre Treatment  room air  -CS     O2 Delivery Intra Treatment  room air  -CS     O2 Delivery Post Treatment  room air  -CS     Pre Patient Position  Supine  -CS     Intra Patient Position  Supine  -CS     Post Patient Position  Supine  -CS     Row Name 04/01/21 1548          Positioning and Restraints    Pre-Treatment Position  in bed  -CS     Post Treatment Position  bed  -CS     In Bed  notified nsg;supine;call light within reach;encouraged to call for assist;exit alarm on  -CS       User Key  (r) = Recorded By, (t) = Taken By, (c) = Cosigned By    Initials Name Provider Type    Jj Sarah OT Occupational Therapist        Outcome Measures     Row Name 04/01/21 1552          How much help from another is currently needed...    Putting on and taking off regular lower body clothing?  1  -CS     Bathing (including washing, rinsing, and drying)  1  -CS     Toileting (which includes using toilet bed pan or urinal)  1  -CS     Putting on and taking off regular upper body clothing  1  -CS     Taking care of personal grooming (such as brushing teeth)  2  -CS     Eating meals  2  -CS     AM-PAC 6 Clicks Score (OT)  8  -CS     Row Name 04/01/21 1552          Modified W[place] Scale    Modified W[place]all Scale  5 - Severe disability.  Bedridden, incontinent, and requiring constant nursing care and attention.  -     Row Name 04/01/21 1552          Functional Assessment    Outcome Measure Options  AM-PAC 6 Clicks Daily Activity (OT)  -CS       User Key  (r) = Recorded By, (t) = Taken By, (c) = Cosigned By    Initials Name Provider Type    Jj Sarah OT Occupational  Therapist        Occupational Therapy Education                 Title: PT OT SLP Therapies (In Progress)     Topic: Occupational Therapy (In Progress)     Point: ADL training (Done)     Description:   Instruct learner(s) on proper safety adaptation and remediation techniques during self care or transfers.   Instruct in proper use of assistive devices.              Learning Progress Summary           Patient Acceptance, E,TB,D,H, VU,NR by  at 3/30/2021 1546    Nonacceptance, E, NL by AN at 3/28/2021 0814    Comment: Educated pt on OT role and POC.                   Point: Home exercise program (Not Started)     Description:   Instruct learner(s) on appropriate technique for monitoring, assisting and/or progressing therapeutic exercises/activities.              Learner Progress:  Not documented in this visit.          Point: Precautions (Done)     Description:   Instruct learner(s) on prescribed precautions during self-care and functional transfers.              Learning Progress Summary           Patient Acceptance, E,TB,D,H, VU,NR by  at 3/30/2021 1546                   Point: Body mechanics (Done)     Description:   Instruct learner(s) on proper positioning and spine alignment during self-care, functional mobility activities and/or exercises.              Learning Progress Summary           Patient Acceptance, E,TB,D,H, VU,NR by  at 3/30/2021 1546                               User Key     Initials Effective Dates Name Provider Type Discipline     06/22/15 -  Rhoda Coker, OT Occupational Therapist OT     03/07/18 -  Romelia Nguyen, OT Occupational Therapist OT              OT Recommendation and Plan     Plan of Care Review  Plan of Care Reviewed With: patient  Progress: no change  Outcome Summary: Pt lethargic upon arrival, declined EOB/OOB activities, agreeable to supine ther-ex. Pt tolerated BUE AAROM ther-ex (2/10reps) in supine. Reccommend d/c to IRF.     Time Calculation:   Time Calculation- OT      Row Name 04/01/21 1552             Time Calculation- OT    OT Start Time  1441  -CS      OT Received On  04/01/21  -CS      OT Goal Re-Cert Due Date  04/07/21  -CS         Timed Charges    24894 - OT Therapeutic Exercise Minutes  10  -CS        User Key  (r) = Recorded By, (t) = Taken By, (c) = Cosigned By    Initials Name Provider Type    CS Jj Hernadez, OT Occupational Therapist        Therapy Charges for Today     Code Description Service Date Service Provider Modifiers Qty    72210411471 HC OT THER PROC EA 15 MIN 4/1/2021 Jj Hernadez OT GO 1               Jj Hernadez OT  4/1/2021

## 2021-04-01 NOTE — PLAN OF CARE
Goal Outcome Evaluation:  Plan of Care Reviewed With: (P) patient  Progress: (P) improving   SLP treatment completed. Will continue to address speech, cognition, and dysphagia goals. Please see note for further details and recommendations.

## 2021-04-01 NOTE — PROGRESS NOTES
[facility] Medicine Services  PROGRESS NOTE    Patient Name: Sebastian Winter  : 1940  MRN: [id number]    Date of Admission: 3/26/2021  Primary Care Physician: Yoav Bee MD    Subjective   Subjective     CC:  F/U CVA    HPI:   Slurred speech, denies focal weakness, no new complaints.     ROS:  Unable to assess     Objective   Objective     Vital Signs:   Temp:  [96.5 °F (35.8 °C)-98.1 °F (36.7 °C)] 97.3 °F (36.3 °C)  Heart Rate:  [51-70] 53  Resp:  [18] 18  BP: (119-150)/(66-93) 138/74  Total (NIH Stroke Scale): 4     Physical Exam:  Constitutional: No acute distress, awake, alert,  In bed, smiling  HENT: NCAT, mucous membranes moist  Respiratory: Clear to auscultation bilaterally, respiratory effort normal   Cardiovascular: RRR, no murmurs, rubs, or gallops  Gastrointestinal: Positive bowel sounds, soft, nontender, nondistended  Musculoskeletal: No bilateral ankle edema  Psychiatric:  cooperative  Neurologic: right facial droop, dysarthria, UE/LE weak but symmetric strength     Skin: No rashes      Results Reviewed:  Results from last 7 days   Lab Units 21  0529 21  0730 21  1459   WBC 10*3/mm3 6.98 7.67 8.11   HEMOGLOBIN g/dL 13.4 13.9 14.4   HEMATOCRIT % 42.0 44.0 45.2   PLATELETS 10*3/mm3 202 207 219     Results from last 7 days   Lab Units 21  0718 21  1431 21  0529 21  0730 21  1459   SODIUM mmol/L 145  --  141 145  --    POTASSIUM mmol/L 3.8 3.5 3.1* 3.8  --    CHLORIDE mmol/L 109*  --  104 106  --    CO2 mmol/L 29.0  --  27.0 29.0  --    BUN mg/dL 11  --  11 11  --    CREATININE mg/dL 1.22  --  1.09 1.24  --    GLUCOSE mg/dL 99  --  81 87  --    CALCIUM mg/dL 8.8  --  8.2* 8.5*  --    ALT (SGPT) U/L  --   --   --   --  12   AST (SGOT) U/L  --   --   --   --  20   TROPONIN T ng/mL  --   --   --   --  <0.010     Estimated Creatinine Clearance: 41 mL/min (by C-G formula based on SCr of 1.22 mg/dL).    Microbiology  Results Abnormal     Procedure Component Value - Date/Time    COVID PRE-OP / PRE-PROCEDURE SCREENING ORDER (NO ISOLATION) - Swab, Nasopharynx [[id number]]  (Normal) Collected: 03/26/21 1816    Lab Status: Final result Specimen: Swab from Nasopharynx Updated: 03/26/21 1852    Narrative:      The following orders were created for panel order COVID PRE-OP / PRE-PROCEDURE SCREENING ORDER (NO ISOLATION) - Swab, Nasopharynx.  Procedure                               Abnormality         Status                     ---------                               -----------         ------                     COVID-19 and FLU A/B PCR...[[id number]]  Normal              Final result                 Please view results for these tests on the individual orders.    COVID-19 and FLU A/B PCR - Swab, Nasopharynx [[id number]]  (Normal) Collected: 03/26/21 1816    Lab Status: Final result Specimen: Swab from Nasopharynx Updated: 03/26/21 1852     COVID19 Not Detected     Influenza A PCR Not Detected     Influenza B PCR Not Detected    Narrative:      Fact sheet for providers: https://www.fda.gov/media/294809/download    Fact sheet for patients: https://www.fda.gov/media/099144/download    Test performed by PCR.          Imaging Results (Last 24 Hours)     ** No results found for the last 24 hours. **          Results for orders placed during the hospital encounter of 03/26/21    Adult Transthoracic Echo Complete W/ Cont if Necessary Per Protocol (With Agitated Saline)    Interpretation Summary  · Estimated left ventricular EF = 35% Left ventricular systolic function is moderately decreased. Inferoposterior hypokinesis  · Trace to mild mitral valve regurgitation is present.  · Trace tricuspid valve regurgitation is present.  · Saline test results are negative for intracardiac shunting.  · No obvious cardiac source for embolus is seen.      I have reviewed the medications:  Scheduled Meds:aspirin, 325 mg, Oral, Daily   Or  aspirin, 300 mg, Rectal,  Daily  atorvastatin, 80 mg, Oral, Nightly  carvedilol, 3.125 mg, Oral, BID With Meals  clonazePAM, 1 mg, Oral, Nightly  clopidogrel, 75 mg, Oral, Daily  donepezil, 5 mg, Oral, Nightly  fludrocortisone, 100 mcg, Oral, Daily  levothyroxine, 75 mcg, Oral, Q AM  pantoprazole, 40 mg, Oral, Q AM  sertraline, 50 mg, Oral, Daily  sodium chloride, 10 mL, Intravenous, Q12H      Continuous Infusions:   PRN Meds:.clonazePAM  •  magnesium sulfate **OR** magnesium sulfate **OR** magnesium sulfate  •  potassium chloride **OR** potassium chloride **OR** potassium chloride  •  sodium chloride  •  sodium chloride    Assessment/Plan   Assessment & Plan     Active Hospital Problems    Diagnosis  POA   • **Dysarthria [R47.1]  Yes   • Cardiomyopathy (CMS/HCC) [I42.9]  Yes   • Essential hypertension [I10]  Yes   • Dyslipidemia [E78.5]  Yes   • Acute left-sided weakness [R53.1]  Yes   • Dementia without behavioral disturbance (CMS/HCC) [F03.90]  Yes   • Hypothyroidism (acquired) [E03.9]  Yes   • GERD without esophagitis [K21.9]  Yes      Resolved Hospital Problems   No resolved problems to display.        Brief Hospital Course to date:  Sebastian Winter is a 81 y.o. male with recent CVA treated at [facility] with no residual deficit per family, subsequently transitioned to long term care at [facility].   Sent to [facility] ED with acute L side weakness and dysarthria similar to his recent stroke as well as right sided facial droop.     Dysarthria and left sided weakness, acute  HX recent CVA  - CT head, CT perfusion, CTA head/neck with no acute abnormality or significant vascular stenosis/occlusion.    - MRI brain shows old lacunar infarct in the bud but no acute abnormality per radiology interpretation.  - exac of prev stroke vs new stroke; r/o cardioembolic   - P2Y12 103:  approp inhibition noted  - echo - EF 35, bubble study neg   -Continue ASA, plavix, atorvastatin  -PT/OT/SLP   - cardiac event monitor at DC: Alex -  discussed w dr loza      Diastolic Heart Failure  -EF 35% , grade I,   -cardiology consulted and started carvedilol   -had LHC at [facility], with some CAD but no LETICIA     Dementia  -Son-in-law reports he normally does well when he gets his nightly clonazepam     Hypothyroidism  -Continue synthroid     GERD  -Continue PPI     Vitamin B12 deficiency  Vitamin D-deficiency  -On replacement at SNF       DVT Prophylaxis:  Mechanical       Disposition: I expect the patient to be discharged TBD, will need IRF     CODE STATUS:   Code Status and Medical Interventions:   Ordered at: 03/26/21 1744     Limited Support to NOT Include:    Antiarrhythmic Drugs    Cardioversion/Defibrillation    Intubation     Code Status:    No CPR     Medical Interventions (Level of Support Prior to Arrest):    Limited       Lori Barrett MD  04/01/21

## 2021-04-01 NOTE — PLAN OF CARE
Goal Outcome Evaluation:  Plan of Care Reviewed With: patient  Progress: no change  Outcome Summary: VSS. No c/o pain or discomfort. Confused early in the shift, resting well throughout the night. Will continue to monitor.

## 2021-04-02 PROCEDURE — 99232 SBSQ HOSP IP/OBS MODERATE 35: CPT | Performed by: NURSE PRACTITIONER

## 2021-04-02 PROCEDURE — 97110 THERAPEUTIC EXERCISES: CPT

## 2021-04-02 PROCEDURE — 97530 THERAPEUTIC ACTIVITIES: CPT

## 2021-04-02 RX ORDER — ACETAMINOPHEN 325 MG/1
650 TABLET ORAL EVERY 6 HOURS PRN
Status: DISCONTINUED | OUTPATIENT
Start: 2021-04-02 | End: 2021-04-08 | Stop reason: HOSPADM

## 2021-04-02 RX ORDER — CLONAZEPAM 1 MG/1
1 TABLET ORAL NIGHTLY
Status: DISCONTINUED | OUTPATIENT
Start: 2021-04-02 | End: 2021-04-08 | Stop reason: HOSPADM

## 2021-04-02 RX ORDER — ACETAMINOPHEN 650 MG/1
650 SUPPOSITORY RECTAL EVERY 4 HOURS PRN
Status: DISCONTINUED | OUTPATIENT
Start: 2021-04-02 | End: 2021-04-08 | Stop reason: HOSPADM

## 2021-04-02 RX ADMIN — ACETAMINOPHEN 650 MG: 325 TABLET ORAL at 20:49

## 2021-04-02 RX ADMIN — FLUDROCORTISONE ACETATE 100 MCG: 0.1 TABLET ORAL at 09:32

## 2021-04-02 RX ADMIN — CARVEDILOL 3.12 MG: 3.12 TABLET, FILM COATED ORAL at 17:57

## 2021-04-02 RX ADMIN — SODIUM CHLORIDE, PRESERVATIVE FREE 10 ML: 5 INJECTION INTRAVENOUS at 09:34

## 2021-04-02 RX ADMIN — ASPIRIN 325 MG ORAL TABLET 325 MG: 325 PILL ORAL at 09:33

## 2021-04-02 RX ADMIN — PANTOPRAZOLE SODIUM 40 MG: 40 TABLET, DELAYED RELEASE ORAL at 05:28

## 2021-04-02 RX ADMIN — DONEPEZIL HYDROCHLORIDE 5 MG: 10 TABLET, FILM COATED ORAL at 20:35

## 2021-04-02 RX ADMIN — CARVEDILOL 3.12 MG: 3.12 TABLET, FILM COATED ORAL at 09:32

## 2021-04-02 RX ADMIN — ATORVASTATIN CALCIUM 80 MG: 40 TABLET, FILM COATED ORAL at 20:35

## 2021-04-02 RX ADMIN — SODIUM CHLORIDE, PRESERVATIVE FREE 10 ML: 5 INJECTION INTRAVENOUS at 20:36

## 2021-04-02 RX ADMIN — LEVOTHYROXINE SODIUM 75 MCG: 75 TABLET ORAL at 05:28

## 2021-04-02 RX ADMIN — SERTRALINE HYDROCHLORIDE 50 MG: 50 TABLET ORAL at 09:33

## 2021-04-02 RX ADMIN — CLONAZEPAM 1 MG: 1 TABLET ORAL at 20:35

## 2021-04-02 RX ADMIN — CLOPIDOGREL BISULFATE 75 MG: 75 TABLET ORAL at 09:33

## 2021-04-02 NOTE — PROGRESS NOTES
Continued Stay Note  [facility]     Patient Name: Sebastian Winter  MRN: [id number]  Today's Date: 4/2/2021    Admit Date: 3/26/2021    Discharge Plan     Row Name 04/02/21 0933       Plan    Plan  [facility]    Patient/Family in Agreement with Plan  yes    Plan Comments  Spoke with Jozef (daughter) by phone. Plan is [facility] when accepted by facility. Jayne from admissions is going to do an assessment on Monday on the patient. Called Jayne at [facility] and left a voice mail as she is off today, CM will continue to follow.    Final Discharge Disposition Code  03 - skilled nursing facility (SNF)        Discharge Codes    No documentation.             Hipolito Thompson RN

## 2021-04-02 NOTE — PLAN OF CARE
Goal Outcome Evaluation:  Plan of Care Reviewed With: patient  Progress: no change  Outcome Summary: Pt was alert but was difficult to understand and had difficulty following instruction for PT activities.  Pt required mod-maxA with bed mobility and required assist to perform LE strengthening exercises.  PT demonstrated posterior lean when sitting EOB requiring SBA-modA for sitting balance.  Continue inpatient PT per POC.  Pt will need SNF upon D/C.

## 2021-04-02 NOTE — PLAN OF CARE
Goal Outcome Evaluation:   Pt pleasant and cooperative. He rested well throughout the night. Pt only aware to self and that he is in the hospital. His does have some word salad when he speaks but is able to follow directions and answer some questions. He takes his pills whole in pudding. He is a feeder- see diet orders. [facility] is a 4. Plan to d/c to [facility] in [place] when ready. VSS on RA, will cont to monitor.

## 2021-04-02 NOTE — THERAPY TREATMENT NOTE
Patient Name: Sebastian Winter  : 1940    MRN: [id number]                              Today's Date: 2021       Admit Date: 3/26/2021    Visit Dx:     ICD-10-CM ICD-9-CM   1. Aphasia  R47.01 784.3   2. Acute left-sided weakness  R53.1 728.87   3. Oropharyngeal dysphagia  R13.12 787.22   4. Dysarthria  R47.1 784.51     Patient Active Problem List   Diagnosis   • Acute left-sided weakness   • Dysarthria   • Dementia without behavioral disturbance (CMS/HCC)   • Hypothyroidism (acquired)   • GERD without esophagitis   • Cardiomyopathy (CMS/HCC)   • Essential hypertension   • Dyslipidemia     Past Medical History:   Diagnosis Date   • Dementia (CMS/HCC)    • Depression    • GERD (gastroesophageal reflux disease)    • Hypothyroid      Past Surgical History:   Procedure Laterality Date   • JOINT REPLACEMENT       General Information     Row Name 21 1653          Physical Therapy Time and Intention    Document Type  therapy note (daily note)  -     Mode of Treatment  physical therapy  -     Row Name 21          General Information    Patient Profile Reviewed  yes  -LF     Existing Precautions/Restrictions  fall  -     Barriers to Rehab  cognitive status;medically complex;previous functional deficit  -     Row Name 21          Cognition    Orientation Status (Cognition)  oriented to;person;verbal cues/prompts needed for orientation Cues for place ([facility] instead of [facility] and 2021 instead of )  Oriented to month.  -     Row Name 21          Safety Issues, Functional Mobility    Safety Issues Affecting Function (Mobility)  ability to follow commands;at risk behavior observed;awareness of need for assistance;insight into deficits/self-awareness;safety precaution awareness;safety precautions follow-through/compliance;judgment;problem-solving;positioning of assistive device;sequencing abilities  -     Impairments Affecting Function (Mobility)  endurance/activity  tolerance;strength;range of motion (ROM);postural/trunk control;balance;coordination;motor control;cognition  -     Cognitive Impairments, Mobility Safety/Performance  awareness, need for assistance;insight into deficits/self-awareness;judgment;problem-solving/reasoning;safety precaution awareness;safety precaution follow-through;sequencing abilities  -       User Key  (r) = Recorded By, (t) = Taken By, (c) = Cosigned By    Initials Name Provider Type     Nava Wilburn, PT Physical Therapist        Mobility     Row Name 04/02/21 1656          Bed Mobility    Bed Mobility  rolling left;rolling right;scooting/bridging;supine-sit;sit-supine  -LF     Rolling Left [place] (Bed Mobility)  minimum assist (75% patient effort);1 person assist;verbal cues;nonverbal cues (demo/gesture)  -LF     Rolling Right [place] (Bed Mobility)  minimum assist (75% patient effort);1 person assist;verbal cues;nonverbal cues (demo/gesture)  -LF     Scooting/Bridging [place] (Bed Mobility)  maximum assist (25% patient effort);verbal cues;nonverbal cues (demo/gesture)  -LF     Supine-Sit [place] (Bed Mobility)  moderate assist (50% patient effort);1 person assist;verbal cues;nonverbal cues (demo/gesture)  -LF     Sit-Supine [place] (Bed Mobility)  moderate assist (50% patient effort);1 person assist;verbal cues;nonverbal cues (demo/gesture)  -LF     Assistive Device (Bed Mobility)  bed rails;head of bed elevated;draw sheet  -     Row Name 04/02/21 1656          Bed-Chair Transfer    Bed-Chair [place] (Transfers)  unable to assess  -     Row Name 04/02/21 1656          Sit-Stand Transfer    Sit-Stand [place] (Transfers)  unable to assess  -     Row Name 04/02/21 1656          Gait/Stairs (Locomotion)    [place] Level (Gait)  unable to assess  -     Comment (Gait/Stairs)  Pt requesting to get back in bed after sitting at EOB for about 5 minutes.  -       User Key  (r) = Recorded By, (t)  = Taken By, (c) = Cosigned By    Initials Name Provider Type    Nava Zhou PT Physical Therapist        Obj/Interventions     Row Name 04/02/21 1659          Motor Skills    Therapeutic Exercise  hip;knee;ankle  -LF     Row Name 04/02/21 1659          Hip (Therapeutic Exercise)    Hip (Therapeutic Exercise)  strengthening exercise;isometric exercises  -LF     Hip Isometrics (Therapeutic Exercise)  bilateral;gluteal sets;hamstring sets;other (see comments) 15-20x  -LF     Hip Strengthening (Therapeutic Exercise)  bilateral;marching while seated;flexion;aBduction;aDduction;other (see comments) 15-20x  -LF     Row Name 04/02/21 1659          Knee (Therapeutic Exercise)    Knee (Therapeutic Exercise)  isometric exercises  -LF     Knee Isometrics (Therapeutic Exercise)  bilateral;quad sets;other (see comments) 15-20x  -LF     Knee Strengthening (Therapeutic Exercise)  bilateral;LAQ (long arc quad);other (see comments) 15-20x  -LF     Row Name 04/02/21 1659          Ankle (Therapeutic Exercise)    Ankle AROM (Therapeutic Exercise)  bilateral;dorsiflexion;plantarflexion;other (see comments) 15-20x  -LF     Row Name 04/02/21 1659          Balance    Balance Assessment  sitting static balance;sitting dynamic balance;standing static balance;standing dynamic balance  -LF     Static Sitting Balance  mild impairment;supported;sitting, edge of bed  -LF     Dynamic Sitting Balance  moderate impairment;unsupported;sitting, edge of bed  -LF     Static Standing Balance  unable to perform activity  -LF     Dynamic Standing Balance  unable to perform activity  -LF     Balance Interventions  sitting;supported;static;dynamic;minimal challenge;dynamic reaching;weight shifting activity  -LF       User Key  (r) = Recorded By, (t) = Taken By, (c) = Cosigned By    Initials Name Provider Type    Nava Zhou PT Physical Therapist        Goals/Plan    No documentation.       Clinical Impression     Row Name 04/02/21 6237           Pain Scale: Numbers Pre/Post-Treatment    Pretreatment Pain Rating  0/10 - no pain  -LF     Posttreatment Pain Rating  0/10 - no pain  -LF     Row Name 04/02/21 1702          Plan of Care Review    Plan of Care Reviewed With  patient  -LF     Progress  no change  -LF     Outcome Summary  Pt was alert but was difficult to understand and had difficulty following instruction for PT activities.  Pt required mod-maxA with bed mobility and required assist to perform LE strengthening exercises.  PT demonstrated posterior lean when sitting EOB requiring SBA-modA for sitting balance.  Continue inpatient PT per POC.  Pt will need SNF upon D/C.  -     Row Name 04/02/21 1702          Therapy Assessment/Plan (PT)    Patient/Family Therapy Goals Statement (PT)  Unable to state.  -     Rehab Potential (PT)  fair, will monitor progress closely  -     Criteria for Skilled Interventions Met (PT)  yes;meets criteria;skilled treatment is necessary  -LF     Predicted Duration of Therapy Intervention (PT)  2 weeks  -     Row Name 04/02/21 1702          Vital Signs    Pre Systolic BP Rehab  139  -LF     Pre Treatment Diastolic BP  70  -LF     Post Systolic BP Rehab  136  -LF     Post Treatment Diastolic BP  104  -LF     Pretreatment Heart Rate (beats/min)  60  -LF     Posttreatment Heart Rate (beats/min)  58  -LF     O2 Delivery Pre Treatment  room air  -LF     O2 Delivery Intra Treatment  room air  -LF     O2 Delivery Post Treatment  room air  -LF     Pre Patient Position  Supine  -LF     Intra Patient Position  Sitting  -LF     Post Patient Position  Supine  -LF     Row Name 04/02/21 1702          Positioning and Restraints    Pre-Treatment Position  in bed  -LF     Post Treatment Position  bed  -LF     In Bed  fowlers;side rails up x2;call light within reach;encouraged to call for assist;exit alarm on;notified nsg  -LF       User Key  (r) = Recorded By, (t) = Taken By, (c) = Cosigned By    Initials Name Provider Type     Nava Zhou, PT Physical Therapist        Outcome Measures     Row Name 04/02/21 1707          How much help from another person do you currently need...    Turning from your back to your side while in flat bed without using bedrails?  2  -LF     Moving from lying on back to sitting on the side of a flat bed without bedrails?  2  -LF     Moving to and from a bed to a chair (including a wheelchair)?  2  -LF     Standing up from a chair using your arms (e.g., wheelchair, bedside chair)?  2  -LF     Climbing 3-5 steps with a railing?  1  -LF     To walk in hospital room?  1  -LF     AM-PAC 6 Clicks Score (PT)  10  -     Row Name 04/02/21 1707          Modified [place] Scale    Modified [place] Scale  5 - Severe disability.  Bedridden, incontinent, and requiring constant nursing care and attention.  -     Row Name 04/02/21 1707          Functional Assessment    Outcome Measure Options  AM-PAC 6 Clicks Basic Mobility (PT);Modified [place]  -LF       User Key  (r) = Recorded By, (t) = Taken By, (c) = Cosigned By    Initials Name Provider Type     Nava Wilburn, PT Physical Therapist        Physical Therapy Education                 Title: PT OT SLP Therapies (In Progress)     Topic: Physical Therapy (In Progress)     Point: Mobility training (In Progress)     Learning Progress Summary           Patient Acceptance, E,D, NL,NR by  at 4/2/2021 1530    Acceptance, E, NR by AS at 3/29/2021 1038    Acceptance, E, NR by SC at 3/28/2021 1010    Comment: REVIEWED BENEFITS OF ACTIVITY                   Point: Home exercise program (In Progress)     Learning Progress Summary           Patient Acceptance, E,D, NL,NR by  at 4/2/2021 1530    Acceptance, E, NR by AS at 3/29/2021 1038    Acceptance, E, NR by SC at 3/28/2021 1010    Comment: REVIEWED BENEFITS OF ACTIVITY                   Point: Body mechanics (In Progress)     Learning Progress Summary           Patient Acceptance, E,D, NL,NR by  at 4/2/2021 1530     Acceptance, E, NR by AS at 3/29/2021 1038    Acceptance, E, NR by SC at 3/28/2021 1010    Comment: REVIEWED BENEFITS OF ACTIVITY                   Point: Precautions (In Progress)     Learning Progress Summary           Patient Acceptance, E,D, NL,NR by  at 4/2/2021 1530    Acceptance, E, NR by AS at 3/29/2021 1038    Acceptance, E, NR by SC at 3/28/2021 1010    Comment: REVIEWED BENEFITS OF ACTIVITY                               User Key     Initials Effective Dates Name Provider Type Discipline    SC 06/19/15 -  Bryce Bran, PT Physical Therapist PT    AS 06/22/15 -  Annette Ramirez, PTA Physical Therapy Assistant PT     06/08/18 -  Nava Wilburn, PT Physical Therapist PT              PT Recommendation and Plan     Plan of Care Reviewed With: patient  Progress: no change  Outcome Summary: Pt was alert but was difficult to understand and had difficulty following instruction for PT activities.  Pt required mod-maxA with bed mobility and required assist to perform LE strengthening exercises.  PT demonstrated posterior lean when sitting EOB requiring SBA-modA for sitting balance.  Continue inpatient PT per POC.  Pt will need SNF upon D/C.     Time Calculation:   PT Charges     Row Name 04/02/21 1530             Time Calculation    Start Time  1530  -LF      PT Received On  04/02/21  -      PT Goal Re-Cert Due Date  04/06/21  -         Timed Charges    08556 - PT Therapeutic Exercise Minutes  16  -LF      32541 - PT Therapeutic Activity Minutes  20  -LF        User Key  (r) = Recorded By, (t) = Taken By, (c) = Cosigned By    Initials Name Provider Type     Nava Wilburn, PT Physical Therapist        Therapy Charges for Today     Code Description Service Date Service Provider Modifiers Qty    70153415039 HC PT THER PROC EA 15 MIN 4/2/2021 Nava Wilburn, PT GP 1    68969465090 HC PT THERAPEUTIC ACT EA 15 MIN 4/2/2021 Nava Wilburn, PT GP 1          PT G-Codes  Outcome Measure Options:  AM-PAC 6 Clicks Basic Mobility (PT), Modified [place]  AM-PAC 6 Clicks Score (PT): 10  AM-PAC 6 Clicks Score (OT): 8  Modified [place] Scale: 5 - Severe disability.  Bedridden, incontinent, and requiring constant nursing care and attention.    Nava Wilbunr, PT  4/2/2021

## 2021-04-02 NOTE — PLAN OF CARE
Goal Outcome Evaluation:  Plan of Care Reviewed With: patient  Progress: no change  Outcome Summary: PT with no new complaints. VSS. Up to chair x 2 hours with assist x 2. Awaiting rehab.

## 2021-04-02 NOTE — PROGRESS NOTES
[facility]  PROGRESS NOTE    Patient Name: Sebastian Winter  : 1940  MRN: [id number]    Date of Admission: 3/26/2021  Primary Care Physician: Yoav Bee MD    Subjective   Subjective     CC:  Follow up CVA    HPI:  Patient seen resting in bed in no apparent distress. No acute events overnight per nursing. He continues to have slurred speech. He is aware of plan to discharge to inpatient rehab and is agreeable. No new complaints at this time.     ROS:  Unable to obtain due to dysarthria.    Objective   Objective     Vital Signs:   Temp:  [97.4 °F (36.3 °C)-97.9 °F (36.6 °C)] 97.7 °F (36.5 °C)  Heart Rate:  [48-63] 50  Resp:  [18] 18  BP: (122-160)/(57-88) 155/88  Total (NIH Stroke Scale): 4     Physical Exam:  Constitutional: No acute distress, awake, alert  HENT: NCAT, mucous membranes moist  Respiratory: Clear to auscultation bilaterally, respiratory effort normal on RA  Cardiovascular: RRR, no murmurs, rubs, or gallops  Gastrointestinal: Positive bowel sounds, soft, nontender, nondistended  Musculoskeletal: No bilateral ankle edema  Psychiatric: Appropriate affect, cooperative  Neurologic: Oriented to person/place, right facial droop, dysarthria  Skin: warm, dry, no visible rash    Results Reviewed:  Results from last 7 days   Lab Units 21  0529 21  0730 21  1459   WBC 10*3/mm3 6.98 7.67 8.11   HEMOGLOBIN g/dL 13.4 13.9 14.4   HEMATOCRIT % 42.0 44.0 45.2   PLATELETS 10*3/mm3 202 207 219     Results from last 7 days   Lab Units 21  0718 21  1431 21  0529 21  0730 21  1459   SODIUM mmol/L 145  --  141 145  --    POTASSIUM mmol/L 3.8 3.5 3.1* 3.8  --    CHLORIDE mmol/L 109*  --  104 106  --    CO2 mmol/L 29.0  --  27.0 29.0  --    BUN mg/dL 11  --  11 11  --    CREATININE mg/dL 1.22  --  1.09 1.24  --    GLUCOSE mg/dL 99  --  81 87  --    CALCIUM mg/dL 8.8  --  8.2* 8.5*  --    ALT (SGPT) U/L  --   --   --    --  12   AST (SGOT) U/L  --   --   --   --  20   TROPONIN T ng/mL  --   --   --   --  <0.010     Estimated Creatinine Clearance: 41 mL/min (by C-G formula based on SCr of 1.22 mg/dL).    Microbiology Results Abnormal     Procedure Component Value - Date/Time    COVID PRE-OP / PRE-PROCEDURE SCREENING ORDER (NO ISOLATION) - Swab, Nasopharynx [[id number]]  (Normal) Collected: 03/26/21 1816    Lab Status: Final result Specimen: Swab from Nasopharynx Updated: 03/26/21 1852    Narrative:      The following orders were created for panel order COVID PRE-OP / PRE-PROCEDURE SCREENING ORDER (NO ISOLATION) - Swab, Nasopharynx.  Procedure                               Abnormality         Status                     ---------                               -----------         ------                     COVID-19 and FLU A/B PCR...[[id number]]  Normal              Final result                 Please view results for these tests on the individual orders.    COVID-19 and FLU A/B PCR - Swab, Nasopharynx [[id number]]  (Normal) Collected: 03/26/21 1816    Lab Status: Final result Specimen: Swab from Nasopharynx Updated: 03/26/21 1852     COVID19 Not Detected     Influenza A PCR Not Detected     Influenza B PCR Not Detected    Narrative:      Fact sheet for providers: https://www.fda.gov/media/710195/download    Fact sheet for patients: https://www.fda.gov/media/249387/download    Test performed by PCR.          Imaging Results (Last 24 Hours)     ** No results found for the last 24 hours. **          Results for orders placed during the hospital encounter of 03/26/21    Adult Transthoracic Echo Complete W/ Cont if Necessary Per Protocol (With Agitated Saline)    Interpretation Summary  · Estimated left ventricular EF = 35% Left ventricular systolic function is moderately decreased. Inferoposterior hypokinesis  · Trace to mild mitral valve regurgitation is present.  · Trace tricuspid valve regurgitation is present.  · Saline test results are  negative for intracardiac shunting.  · No obvious cardiac source for embolus is seen.      I have reviewed the medications:  Scheduled Meds:aspirin, 325 mg, Oral, Daily   Or  aspirin, 300 mg, Rectal, Daily  atorvastatin, 80 mg, Oral, Nightly  carvedilol, 3.125 mg, Oral, BID With Meals  clonazePAM, 1 mg, Oral, Nightly  clopidogrel, 75 mg, Oral, Daily  donepezil, 5 mg, Oral, Nightly  fludrocortisone, 100 mcg, Oral, Daily  levothyroxine, 75 mcg, Oral, Q AM  pantoprazole, 40 mg, Oral, Q AM  sertraline, 50 mg, Oral, Daily  sodium chloride, 10 mL, Intravenous, Q12H      Continuous Infusions:   PRN Meds:.clonazePAM  •  magnesium sulfate **OR** magnesium sulfate **OR** magnesium sulfate  •  potassium chloride **OR** potassium chloride **OR** potassium chloride  •  sodium chloride  •  sodium chloride    Assessment/Plan   Assessment & Plan     Active Hospital Problems    Diagnosis  POA   • **Dysarthria [R47.1]  Yes   • Cardiomyopathy (CMS/HCC) [I42.9]  Yes   • Essential hypertension [I10]  Yes   • Dyslipidemia [E78.5]  Yes   • Acute left-sided weakness [R53.1]  Yes   • Dementia without behavioral disturbance (CMS/HCC) [F03.90]  Yes   • Hypothyroidism (acquired) [E03.9]  Yes   • GERD without esophagitis [K21.9]  Yes      Resolved Hospital Problems   No resolved problems to display.        Brief Hospital Course to date:  Sebastian Winter is a 81 y.o. male with recent CVA treated at [facility] with no residual deficit per family, subsequently transitioned to long term care at [facility].   Sent to [facility] ED with acute L side weakness and dysarthria similar to his recent stroke as well as right sided facial droop.      This patient's problems and plans were partially entered by my partner and updated as appropriate by me 04/02/21.    Dysarthria and left sided weakness, acute  HX recent CVA  - CT head, CT perfusion, CTA head/neck with no acute abnormality or significant vascular stenosis/occlusion.    - MRI brain shows  old lacunar infarct in the bud but no acute abnormality per radiology interpretation.  - exac of prev stroke vs new stroke; r/o cardioembolic   - P2Y12 103:  approp inhibition noted  - echo - EF 35, bubble study neg   -Continue ASA, plavix, atorvastatin  -PT/OT/SLP   -cardiac event monitor at DC: Alex - discussed w dr loza   -AM labs     Diastolic Heart Failure  -EF 35% , grade I,   -cardiology consulted and started carvedilol   -had LHC at [facility], with some CAD but no LETICIA      Dementia  -Son-in-law reports he normally does well when he gets his nightly clonazepam  -continue clonazepam 1 mg at HS  -continue Aricept     Hypothyroidism  -Continue synthroid     GERD  -Continue PPI     Vitamin B12 deficiency  Vitamin D-deficiency  -On replacement at SNF         DVT Prophylaxis:  Mechanical         Disposition: I expect the patient to be discharged TBD, will need IRF     CODE STATUS:   Code Status and Medical Interventions:   Ordered at: 03/26/21 1746     Limited Support to NOT Include:    Antiarrhythmic Drugs    Cardioversion/Defibrillation    Intubation     Code Status:    No CPR     Medical Interventions (Level of Support Prior to Arrest):    Nichole Dodge, SOPHIA  04/02/21

## 2021-04-03 LAB
ANION GAP SERPL CALCULATED.3IONS-SCNC: 6 MMOL/L (ref 5–15)
BUN SERPL-MCNC: 11 MG/DL (ref 8–23)
BUN/CREAT SERPL: 10.5 (ref 7–25)
CALCIUM SPEC-SCNC: 8.6 MG/DL (ref 8.6–10.5)
CHLORIDE SERPL-SCNC: 106 MMOL/L (ref 98–107)
CO2 SERPL-SCNC: 31 MMOL/L (ref 22–29)
CREAT SERPL-MCNC: 1.05 MG/DL (ref 0.76–1.27)
DEPRECATED RDW RBC AUTO: 44.9 FL (ref 37–54)
ERYTHROCYTE [DISTWIDTH] IN BLOOD BY AUTOMATED COUNT: 13.2 % (ref 12.3–15.4)
GFR SERPL CREATININE-BSD FRML MDRD: 68 ML/MIN/1.73
GLUCOSE SERPL-MCNC: 97 MG/DL (ref 65–99)
HCT VFR BLD AUTO: 42.3 % (ref 37.5–51)
HGB BLD-MCNC: 13.3 G/DL (ref 13–17.7)
MAGNESIUM SERPL-MCNC: 2 MG/DL (ref 1.6–2.4)
MCH RBC QN AUTO: 28.8 PG (ref 26.6–33)
MCHC RBC AUTO-ENTMCNC: 31.4 G/DL (ref 31.5–35.7)
MCV RBC AUTO: 91.6 FL (ref 79–97)
PLATELET # BLD AUTO: 182 10*3/MM3 (ref 140–450)
PMV BLD AUTO: 11 FL (ref 6–12)
POTASSIUM SERPL-SCNC: 3.7 MMOL/L (ref 3.5–5.2)
RBC # BLD AUTO: 4.62 10*6/MM3 (ref 4.14–5.8)
SODIUM SERPL-SCNC: 143 MMOL/L (ref 136–145)
WBC # BLD AUTO: 7.13 10*3/MM3 (ref 3.4–10.8)

## 2021-04-03 PROCEDURE — 99232 SBSQ HOSP IP/OBS MODERATE 35: CPT | Performed by: INTERNAL MEDICINE

## 2021-04-03 PROCEDURE — 85027 COMPLETE CBC AUTOMATED: CPT | Performed by: NURSE PRACTITIONER

## 2021-04-03 PROCEDURE — 83735 ASSAY OF MAGNESIUM: CPT | Performed by: NURSE PRACTITIONER

## 2021-04-03 PROCEDURE — 80048 BASIC METABOLIC PNL TOTAL CA: CPT | Performed by: NURSE PRACTITIONER

## 2021-04-03 RX ADMIN — CARVEDILOL 3.12 MG: 3.12 TABLET, FILM COATED ORAL at 18:03

## 2021-04-03 RX ADMIN — ACETAMINOPHEN 650 MG: 325 TABLET ORAL at 21:22

## 2021-04-03 RX ADMIN — DONEPEZIL HYDROCHLORIDE 5 MG: 10 TABLET, FILM COATED ORAL at 21:22

## 2021-04-03 RX ADMIN — CLONAZEPAM 1 MG: 1 TABLET ORAL at 21:22

## 2021-04-03 RX ADMIN — FLUDROCORTISONE ACETATE 100 MCG: 0.1 TABLET ORAL at 11:07

## 2021-04-03 RX ADMIN — LEVOTHYROXINE SODIUM 75 MCG: 75 TABLET ORAL at 05:51

## 2021-04-03 RX ADMIN — SERTRALINE HYDROCHLORIDE 50 MG: 50 TABLET ORAL at 11:07

## 2021-04-03 RX ADMIN — PANTOPRAZOLE SODIUM 40 MG: 40 TABLET, DELAYED RELEASE ORAL at 05:51

## 2021-04-03 RX ADMIN — CLOPIDOGREL BISULFATE 75 MG: 75 TABLET ORAL at 11:07

## 2021-04-03 RX ADMIN — ATORVASTATIN CALCIUM 80 MG: 40 TABLET, FILM COATED ORAL at 21:22

## 2021-04-03 RX ADMIN — CARVEDILOL 3.12 MG: 3.12 TABLET, FILM COATED ORAL at 11:07

## 2021-04-03 RX ADMIN — ASPIRIN 325 MG ORAL TABLET 325 MG: 325 PILL ORAL at 11:06

## 2021-04-03 NOTE — PROGRESS NOTES
[facility] Medicine Services  PROGRESS NOTE    Patient Name: Sebastian Winter  : 1940  MRN: [id number]    Date of Admission: 3/26/2021  Primary Care Physician: Yoav Bee MD    Subjective   Subjective     CC:  Follow up CVA    HPI:  Asks if he is leaving today.  Does not think his speech is better or worse.     ROS:  Not hungry - hasn't eaten breakfast  No dyspnea  No chest pain     Objective   Objective     Vital Signs:   Temp:  [97.2 °F (36.2 °C)-97.8 °F (36.6 °C)] 97.8 °F (36.6 °C)  Heart Rate:  [47-73] 47  Resp:  [18] 18  BP: (128-154)/(59-89) 128/59  Total (NIH Stroke Scale): 4     Physical Exam:  Constitutional: No acute distress, awake, alert, flat affect  HENT: NCAT, mucous membranes moist  Respiratory: Clear to auscultation bilaterally, respiratory effort normal on RA  Cardiovascular: RRR, no murmurs, rubs, or gallops  Gastrointestinal: Positive bowel sounds, soft, nontender, nondistended  Musculoskeletal: No bilateral ankle edema  Psychiatric: flat affect, cooperative - more withdrawn than previously   Neurologic: Oriented to person/place, right facial droop, dysarthria seems stable  Skin: warm, dry, no visible rash    Results Reviewed:  Results from last 7 days   Lab Units 21  0516 21  0529   WBC 10*3/mm3 7.13 6.98   HEMOGLOBIN g/dL 13.3 13.4   HEMATOCRIT % 42.3 42.0   PLATELETS 10*3/mm3 182 202     Results from last 7 days   Lab Units 21  0718 21  1431 21  0529   SODIUM mmol/L 145  --  141   POTASSIUM mmol/L 3.8 3.5 3.1*   CHLORIDE mmol/L 109*  --  104   CO2 mmol/L 29.0  --  27.0   BUN mg/dL 11  --  11   CREATININE mg/dL 1.22  --  1.09   GLUCOSE mg/dL 99  --  81   CALCIUM mg/dL 8.8  --  8.2*     Estimated Creatinine Clearance: 41 mL/min (by C-G formula based on SCr of 1.22 mg/dL).    Microbiology Results Abnormal     Procedure Component Value - Date/Time    COVID PRE-OP / PRE-PROCEDURE SCREENING ORDER (NO ISOLATION) - Swab,  Nasopharynx [[id number]]  (Normal) Collected: 03/26/21 1816    Lab Status: Final result Specimen: Swab from Nasopharynx Updated: 03/26/21 1852    Narrative:      The following orders were created for panel order COVID PRE-OP / PRE-PROCEDURE SCREENING ORDER (NO ISOLATION) - Swab, Nasopharynx.  Procedure                               Abnormality         Status                     ---------                               -----------         ------                     COVID-19 and FLU A/B PCR...[[id number]]  Normal              Final result                 Please view results for these tests on the individual orders.    COVID-19 and FLU A/B PCR - Swab, Nasopharynx [[id number]]  (Normal) Collected: 03/26/21 1816    Lab Status: Final result Specimen: Swab from Nasopharynx Updated: 03/26/21 1852     COVID19 Not Detected     Influenza A PCR Not Detected     Influenza B PCR Not Detected    Narrative:      Fact sheet for providers: https://www.fda.gov/media/110071/download    Fact sheet for patients: https://www.fda.gov/media/595285/download    Test performed by PCR.          Imaging Results (Last 24 Hours)     ** No results found for the last 24 hours. **          Results for orders placed during the hospital encounter of 03/26/21    Adult Transthoracic Echo Complete W/ Cont if Necessary Per Protocol (With Agitated Saline)    Interpretation Summary  · Estimated left ventricular EF = 35% Left ventricular systolic function is moderately decreased. Inferoposterior hypokinesis  · Trace to mild mitral valve regurgitation is present.  · Trace tricuspid valve regurgitation is present.  · Saline test results are negative for intracardiac shunting.  · No obvious cardiac source for embolus is seen.      I have reviewed the medications:  Scheduled Meds:aspirin, 325 mg, Oral, Daily   Or  aspirin, 300 mg, Rectal, Daily  atorvastatin, 80 mg, Oral, Nightly  carvedilol, 3.125 mg, Oral, BID With Meals  clonazePAM, 1 mg, Oral,  Nightly  clopidogrel, 75 mg, Oral, Daily  donepezil, 5 mg, Oral, Nightly  fludrocortisone, 100 mcg, Oral, Daily  levothyroxine, 75 mcg, Oral, Q AM  pantoprazole, 40 mg, Oral, Q AM  sertraline, 50 mg, Oral, Daily  sodium chloride, 10 mL, Intravenous, Q12H      Continuous Infusions:   PRN Meds:.•  acetaminophen  •  acetaminophen  •  clonazePAM  •  magnesium sulfate **OR** magnesium sulfate **OR** magnesium sulfate  •  potassium chloride **OR** potassium chloride **OR** potassium chloride  •  sodium chloride  •  sodium chloride    Assessment/Plan   Assessment & Plan     Active Hospital Problems    Diagnosis  POA   • **Dysarthria [R47.1]  Yes   • Cardiomyopathy (CMS/HCC) [I42.9]  Yes   • Essential hypertension [I10]  Yes   • Dyslipidemia [E78.5]  Yes   • Acute left-sided weakness [R53.1]  Yes   • Dementia without behavioral disturbance (CMS/HCC) [F03.90]  Yes   • Hypothyroidism (acquired) [E03.9]  Yes   • GERD without esophagitis [K21.9]  Yes      Resolved Hospital Problems   No resolved problems to display.        Brief Hospital Course to date:  Sebastian Winter is a 81 y.o. male with recent CVA treated at [facility] with no residual deficit per family, subsequently transitioned to long term care at [facility].   Sent to [facility] ED with acute L side weakness and dysarthria similar to his recent stroke as well as right sided facial droop.      This patient's problems and plans were partially entered by my partner and updated as appropriate by me 04/03/21.    Dysarthria and left sided weakness, acute  HX recent CVA  - CT head, CT perfusion, CTA head/neck with no acute abnormality or significant vascular stenosis/occlusion.    - MRI brain shows old lacunar infarct in the bud but no acute abnormality per radiology interpretation.  - exac of prev stroke vs new stroke; r/o cardioembolic   - P2Y12 103:  approp inhibition noted  - echo - EF 35, bubble study neg   -Continue ASA, plavix, atorvastatin  -PT/OT/SLP    -cardiac event monitor at DC: Alex - discussed w dr loza   -AM labs     Diastolic Heart Failure  -EF 35% , grade I,   -cardiology consulted and started carvedilol   -had LHC at [facility], with some CAD but no LETICIA      Dementia  -Son-in-law reports he normally does well when he gets his nightly clonazepam  -continue clonazepam 1 mg at HS  -continue Aricept     Hypothyroidism  -Continue synthroid     GERD  -Continue PPI     Vitamin B12 deficiency  Vitamin D-deficiency  -On replacement at SNF         DVT Prophylaxis:  Mechanical         Disposition: I expect the patient to be discharged TBD, will need IRF     CODE STATUS:   Code Status and Medical Interventions:   Ordered at: 03/26/21 1748     Limited Support to NOT Include:    Antiarrhythmic Drugs    Cardioversion/Defibrillation    Intubation     Code Status:    No CPR     Medical Interventions (Level of Support Prior to Arrest):    Limited       Lori Barrett MD  04/03/21

## 2021-04-04 PROCEDURE — 99231 SBSQ HOSP IP/OBS SF/LOW 25: CPT | Performed by: INTERNAL MEDICINE

## 2021-04-04 RX ADMIN — FLUDROCORTISONE ACETATE 100 MCG: 0.1 TABLET ORAL at 10:05

## 2021-04-04 RX ADMIN — SERTRALINE HYDROCHLORIDE 50 MG: 50 TABLET ORAL at 10:05

## 2021-04-04 RX ADMIN — CARVEDILOL 3.12 MG: 3.12 TABLET, FILM COATED ORAL at 18:17

## 2021-04-04 RX ADMIN — CARVEDILOL 3.12 MG: 3.12 TABLET, FILM COATED ORAL at 10:05

## 2021-04-04 RX ADMIN — ASPIRIN 325 MG ORAL TABLET 325 MG: 325 PILL ORAL at 10:04

## 2021-04-04 RX ADMIN — DONEPEZIL HYDROCHLORIDE 5 MG: 10 TABLET, FILM COATED ORAL at 22:25

## 2021-04-04 RX ADMIN — ATORVASTATIN CALCIUM 80 MG: 40 TABLET, FILM COATED ORAL at 22:26

## 2021-04-04 RX ADMIN — PANTOPRAZOLE SODIUM 40 MG: 40 TABLET, DELAYED RELEASE ORAL at 05:49

## 2021-04-04 RX ADMIN — LEVOTHYROXINE SODIUM 75 MCG: 75 TABLET ORAL at 05:49

## 2021-04-04 RX ADMIN — CLOPIDOGREL BISULFATE 75 MG: 75 TABLET ORAL at 10:05

## 2021-04-04 RX ADMIN — CLONAZEPAM 1 MG: 1 TABLET ORAL at 22:25

## 2021-04-04 NOTE — PLAN OF CARE
Goal Outcome Evaluation:   Pt rested well this evening. VSS.  RA, SB/Afib on monitor. No s/s of acute distress. Patient has some word salad at times, but at other times seems to communicate well. Patient has history of anxiety and dementia. Pt has scheduled medications for this that are effective.  Pt can follow some simple directions, and he takes his pills crushed  in applesauce. Patient does need some assistance eating  NIH is a 4. Plan is to d/c to [facility] in [place] this week. Safety protocols in place. Will continue to monitor.

## 2021-04-04 NOTE — PROGRESS NOTES
[facility] Medicine Services  PROGRESS NOTE    Patient Name: Sebastian Winter  : 1940  MRN: [id number]    Date of Admission: 3/26/2021  Primary Care Physician: Yoav Bee MD    Subjective   Subjective     CC:  Follow up CVA    HPI:  Sleeping.  No new events per staff    ROS: no change per staff.     Objective   Objective     Vital Signs:   Temp:  [97.5 °F (36.4 °C)-98.5 °F (36.9 °C)] 98 °F (36.7 °C)  Heart Rate:  [55-66] 56  Resp:  [16-18] 17  BP: (134-151)/(71-81) 151/71  Total (NIH Stroke Scale): 4     Physical Exam:  Constitutional: No acute distress, asleep in bed  HENT: NCAT, mucous membranes moist  Respiratory: Clear to auscultation bilaterally, respiratory effort normal on RA  Cardiovascular: RRR, no murmurs, rubs, or gallops  Gastrointestinal: Positive bowel sounds, soft, nontender, nondistended  Musculoskeletal: No bilateral ankle edema  Psychiatric: cannot assess  Neurologic: R facial droop    Skin: warm, dry, no visible rash    Results Reviewed:  Results from last 7 days   Lab Units 21  0516   WBC 10*3/mm3 7.13   HEMOGLOBIN g/dL 13.3   HEMATOCRIT % 42.3   PLATELETS 10*3/mm3 182     Results from last 7 days   Lab Units 21  0856 21  0718 21  1431   SODIUM mmol/L 143 145  --    POTASSIUM mmol/L 3.7 3.8 3.5   CHLORIDE mmol/L 106 109*  --    CO2 mmol/L 31.0* 29.0  --    BUN mg/dL 11 11  --    CREATININE mg/dL 1.05 1.22  --    GLUCOSE mg/dL 97 99  --    CALCIUM mg/dL 8.6 8.8  --      Estimated Creatinine Clearance: 47.6 mL/min (by C-G formula based on SCr of 1.05 mg/dL).    Microbiology Results Abnormal     Procedure Component Value - Date/Time    COVID PRE-OP / PRE-PROCEDURE SCREENING ORDER (NO ISOLATION) - Swab, Nasopharynx [[id number]]  (Normal) Collected: 21    Lab Status: Final result Specimen: Swab from Nasopharynx Updated: 21 4593    Narrative:      The following orders were created for panel order COVID PRE-OP /  PRE-PROCEDURE SCREENING ORDER (NO ISOLATION) - Swab, Nasopharynx.  Procedure                               Abnormality         Status                     ---------                               -----------         ------                     COVID-19 and FLU A/B PCR...[[id number]]  Normal              Final result                 Please view results for these tests on the individual orders.    COVID-19 and FLU A/B PCR - Swab, Nasopharynx [[id number]]  (Normal) Collected: 03/26/21 1816    Lab Status: Final result Specimen: Swab from Nasopharynx Updated: 03/26/21 1852     COVID19 Not Detected     Influenza A PCR Not Detected     Influenza B PCR Not Detected    Narrative:      Fact sheet for providers: https://www.fda.gov/media/844124/download    Fact sheet for patients: https://www.fda.gov/media/924568/download    Test performed by PCR.          Imaging Results (Last 24 Hours)     ** No results found for the last 24 hours. **          Results for orders placed during the hospital encounter of 03/26/21    Adult Transthoracic Echo Complete W/ Cont if Necessary Per Protocol (With Agitated Saline)    Interpretation Summary  · Estimated left ventricular EF = 35% Left ventricular systolic function is moderately decreased. Inferoposterior hypokinesis  · Trace to mild mitral valve regurgitation is present.  · Trace tricuspid valve regurgitation is present.  · Saline test results are negative for intracardiac shunting.  · No obvious cardiac source for embolus is seen.      I have reviewed the medications:  Scheduled Meds:aspirin, 325 mg, Oral, Daily   Or  aspirin, 300 mg, Rectal, Daily  atorvastatin, 80 mg, Oral, Nightly  carvedilol, 3.125 mg, Oral, BID With Meals  clonazePAM, 1 mg, Oral, Nightly  clopidogrel, 75 mg, Oral, Daily  donepezil, 5 mg, Oral, Nightly  fludrocortisone, 100 mcg, Oral, Daily  levothyroxine, 75 mcg, Oral, Q AM  pantoprazole, 40 mg, Oral, Q AM  sertraline, 50 mg, Oral, Daily  sodium chloride, 10 mL,  Intravenous, Q12H      Continuous Infusions:   PRN Meds:.•  acetaminophen  •  acetaminophen  •  clonazePAM  •  magnesium sulfate **OR** magnesium sulfate **OR** magnesium sulfate  •  potassium chloride **OR** potassium chloride **OR** potassium chloride  •  sodium chloride  •  sodium chloride    Assessment/Plan   Assessment & Plan     Active Hospital Problems    Diagnosis  POA   • **Dysarthria [R47.1]  Yes   • Cardiomyopathy (CMS/HCC) [I42.9]  Yes   • Essential hypertension [I10]  Yes   • Dyslipidemia [E78.5]  Yes   • Acute left-sided weakness [R53.1]  Yes   • Dementia without behavioral disturbance (CMS/HCC) [F03.90]  Yes   • Hypothyroidism (acquired) [E03.9]  Yes   • GERD without esophagitis [K21.9]  Yes      Resolved Hospital Problems   No resolved problems to display.        Brief Hospital Course to date:  Sebastian Winter is a 81 y.o. male with recent CVA treated at [facility] with no residual deficit per family, subsequently transitioned to long term care at [facility].   Sent to [facility] ED with acute L side weakness and dysarthria similar to his recent stroke as well as right sided facial droop.      This patient's problems and plans were partially entered by my partner and updated as appropriate by me 04/04/21.    Dysarthria and left sided weakness, acute  HX recent CVA  - CT head, CT perfusion, CTA head/neck with no acute abnormality or significant vascular stenosis/occlusion.    - MRI brain shows old lacunar infarct in the bud but no acute abnormality per radiology interpretation.  - exac of prev stroke vs new stroke; r/o cardioembolic   - P2Y12 103:  approp inhibition noted  - echo - EF 35, bubble study neg   -Continue ASA, plavix, atorvastatin  -PT/OT/SLP   -cardiac event monitor at DC: Alex - discussed w dr loza   -AM labs     Diastolic Heart Failure  -EF 35% , grade I,   -cardiology consulted and started carvedilol   -had LHC at [facility], with some CAD but no LETICIA       Dementia  -Son-in-law reports he normally does well when he gets his nightly clonazepam  -continue clonazepam 1 mg at HS  -continue Aricept     Hypothyroidism  -Continue synthroid     GERD  -Continue PPI     Vitamin B12 deficiency  Vitamin D-deficiency  -On replacement at SNF         DVT Prophylaxis:  Mechanical         Disposition: I expect the patient to be discharged TBD, will need IRF     CODE STATUS:   Code Status and Medical Interventions:   Ordered at: 03/26/21 1741     Limited Support to NOT Include:    Antiarrhythmic Drugs    Cardioversion/Defibrillation    Intubation     Code Status:    No CPR     Medical Interventions (Level of Support Prior to Arrest):    Limited       Lori Barrett MD  04/04/21

## 2021-04-05 PROCEDURE — 97535 SELF CARE MNGMENT TRAINING: CPT

## 2021-04-05 PROCEDURE — 99232 SBSQ HOSP IP/OBS MODERATE 35: CPT | Performed by: INTERNAL MEDICINE

## 2021-04-05 PROCEDURE — 92507 TX SP LANG VOICE COMM INDIV: CPT

## 2021-04-05 PROCEDURE — 97530 THERAPEUTIC ACTIVITIES: CPT | Performed by: PHYSICAL THERAPIST

## 2021-04-05 PROCEDURE — 25010000002 ENOXAPARIN PER 10 MG: Performed by: INTERNAL MEDICINE

## 2021-04-05 PROCEDURE — 92526 ORAL FUNCTION THERAPY: CPT

## 2021-04-05 RX ADMIN — CLONAZEPAM 1 MG: 1 TABLET ORAL at 23:21

## 2021-04-05 RX ADMIN — ACETAMINOPHEN 650 MG: 325 TABLET ORAL at 09:50

## 2021-04-05 RX ADMIN — LEVOTHYROXINE SODIUM 75 MCG: 75 TABLET ORAL at 07:43

## 2021-04-05 RX ADMIN — ENOXAPARIN SODIUM 40 MG: 40 INJECTION SUBCUTANEOUS at 09:49

## 2021-04-05 RX ADMIN — ATORVASTATIN CALCIUM 80 MG: 40 TABLET, FILM COATED ORAL at 23:21

## 2021-04-05 RX ADMIN — CARVEDILOL 3.12 MG: 3.12 TABLET, FILM COATED ORAL at 09:50

## 2021-04-05 RX ADMIN — CLOPIDOGREL BISULFATE 75 MG: 75 TABLET ORAL at 09:51

## 2021-04-05 RX ADMIN — SERTRALINE HYDROCHLORIDE 50 MG: 50 TABLET ORAL at 09:51

## 2021-04-05 RX ADMIN — PANTOPRAZOLE SODIUM 40 MG: 40 TABLET, DELAYED RELEASE ORAL at 07:43

## 2021-04-05 RX ADMIN — ASPIRIN 325 MG ORAL TABLET 325 MG: 325 PILL ORAL at 09:50

## 2021-04-05 RX ADMIN — CARVEDILOL 3.12 MG: 3.12 TABLET, FILM COATED ORAL at 17:06

## 2021-04-05 RX ADMIN — FLUDROCORTISONE ACETATE 100 MCG: 0.1 TABLET ORAL at 09:51

## 2021-04-05 RX ADMIN — DONEPEZIL HYDROCHLORIDE 5 MG: 10 TABLET, FILM COATED ORAL at 23:22

## 2021-04-05 NOTE — THERAPY TREATMENT NOTE
Patient Name: Sebastian Winter  : 1940    MRN: [id number]                              Today's Date: 2021       Admit Date: 3/26/2021    Visit Dx:     ICD-10-CM ICD-9-CM   1. Aphasia  R47.01 784.3   2. Acute left-sided weakness  R53.1 728.87   3. Oropharyngeal dysphagia  R13.12 787.22   4. Dysarthria  R47.1 784.51     Patient Active Problem List   Diagnosis   • Acute left-sided weakness   • Dysarthria   • Dementia without behavioral disturbance (CMS/HCC)   • Hypothyroidism (acquired)   • GERD without esophagitis   • Cardiomyopathy (CMS/HCC)   • Essential hypertension   • Dyslipidemia     Past Medical History:   Diagnosis Date   • Dementia (CMS/HCC)    • Depression    • GERD (gastroesophageal reflux disease)    • Hypothyroid      Past Surgical History:   Procedure Laterality Date   • JOINT REPLACEMENT       General Information     Row Name 21 1305          Physical Therapy Time and Intention    Document Type  therapy note (daily note)  -CS     Mode of Treatment  physical therapy  -CS     Row Name 21 1309          General Information    Patient Profile Reviewed  yes  -CS     Existing Precautions/Restrictions  fall  -CS     Row Name 21 1306          Cognition    Orientation Status (Cognition)  oriented to;person;disoriented to;place;situation;time;verbal cues/prompts needed for orientation;other (see comments) [facility] but when asked if patient was in hospital he stated yes. Pt also able to answer yes when asked if it was April  -CS     Row Name 21 4110          Safety Issues, Functional Mobility    Safety Issues Affecting Function (Mobility)  safety precautions follow-through/compliance;insight into deficits/self-awareness;ability to follow commands;at risk behavior observed;awareness of need for assistance;judgment;problem-solving;safety precaution awareness;sequencing abilities  -CS     Impairments Affecting Function (Mobility)  endurance/activity  tolerance;strength;range of motion (ROM);postural/trunk control;balance;coordination;motor control;cognition  -       User Key  (r) = Recorded By, (t) = Taken By, (c) = Cosigned By    Initials Name Provider Type    CS Yoana Gramajo, PT Physical Therapist        Mobility     Row Name 04/05/21 1305          Bed Mobility    Bed Mobility  rolling right;supine-sit;scooting/bridging  -CS     Rolling Right [place] (Bed Mobility)  verbal cues;maximum assist (25% patient effort)  -CS     Scooting/Bridging [place] (Bed Mobility)  verbal cues;maximum assist (25% patient effort)  -CS     Supine-Sit [place] (Bed Mobility)  verbal cues;maximum assist (25% patient effort);2 person assist  -CS     Assistive Device (Bed Mobility)  bed rails;draw sheet;head of bed elevated  -CS     Comment (Bed Mobility)  VC's to roll to side and drop feet off of bed and push off of bed to raise trunk. Pt needed several cues on bed mobility. Once is sitting, patient had a strong lean to the R but with cues able to correct to neutral upright position and able to sit CGA.  -     Row Name 04/05/21 1304          Transfers    Comment (Transfers)  VC's on safe hand placement with transfers. Once in standing, patient continuously lifted R foot off of floor and required cues to keep foot on floor, patient able to follow. Pt required cues on sequencing of steps from bed to chair, but had difficulty following without assist to weightshift.  -     Row Name 04/05/21 1305          Bed-Chair Transfer    Bed-Chair [place] (Transfers)  verbal cues;maximum assist (25% patient effort);2 person assist  -CS     Assistive Device (Bed-Chair Transfers)  other (see comments) B UE assist  -CS     Row Name 04/05/21 1305          Sit-Stand Transfer    Sit-Stand [place] (Transfers)  verbal cues;moderate assist (50% patient effort);2 person assist  -CS     Assistive Device (Sit-Stand Transfers)  other (see comments) B UE assist  -CS     Row  Name 04/05/21 1305          Gait/Stairs (Locomotion)    [place] Level (Gait)  unable to assess  -CS     Comment (Gait/Stairs)  Pt unable to follow cues to ambulate further then bed to chair.  -CS       User Key  (r) = Recorded By, (t) = Taken By, (c) = Cosigned By    Initials Name Provider Type    CS Yoana Gramajo PT Physical Therapist        Obj/Interventions     Row Name 04/05/21 1305          Motor Skills    Therapeutic Exercise  hip;knee;ankle;other (see comments) Pt required several cues to stay oriented to exercise task  -CS     Row Name 04/05/21 1305          Hip (Therapeutic Exercise)    Hip Strengthening (Therapeutic Exercise)  bilateral;aBduction;aDduction;5 repetitions;10 repetitions  -     Row Name 04/05/21 1305          Knee (Therapeutic Exercise)    Knee Isometrics (Therapeutic Exercise)  bilateral;quad sets;10 repetitions;5 repetitions  -     Knee Strengthening (Therapeutic Exercise)  bilateral;heel slides;marching while seated;SLR (straight leg raise);LAQ (long arc quad);5 repetitions;10 repetitions  -     Row Name 04/05/21 1305          Ankle (Therapeutic Exercise)    Ankle AROM (Therapeutic Exercise)  bilateral;dorsiflexion;plantarflexion;10 repetitions;5 repetitions  -     Row Name 04/05/21 1305          Balance    Balance Assessment  sitting static balance;standing static balance;standing dynamic balance  -     Static Sitting Balance  mild impairment;unsupported;sitting, edge of bed Max assist with strong R lean but able to correct to CGA with cues.  -CS     Static Standing Balance  moderate impairment;supported;standing  -CS     Dynamic Standing Balance  severe impairment;supported;standing  -CS       User Key  (r) = Recorded By, (t) = Taken By, (c) = Cosigned By    Initials Name Provider Type    Yoana Gunn PT Physical Therapist        Goals/Plan     Row Name 04/05/21 1305          Transfer Goal 1 (PT)    Time Frame (Transfer Goal 1, PT)  other (see comments)  Goal met for STS but not for bed to chair transfer  -CS       User Key  (r) = Recorded By, (t) = Taken By, (c) = Cosigned By    Initials Name Provider Type    Yoana Gunn, FATOU Physical Therapist        Clinical Impression     Row Name 04/05/21 1305          Pain    Additional Documentation  Pain Scale: Numbers Pre/Post-Treatment (Group)  -     Row Name 04/05/21 1305          Pain Scale: Numbers Pre/Post-Treatment    Pretreatment Pain Rating  0/10 - no pain  -CS     Posttreatment Pain Rating  0/10 - no pain  -CS     Pain Intervention(s)  Repositioned;Ambulation/increased activity  -CS     Row Name 04/05/21 1305          Plan of Care Review    Plan of Care Reviewed With  patient  -CS     Progress  no change  -CS     Outcome Summary  Pt required max assist x 2 for supine to sit. Pt able to stand with BUE assist with mod assist x 2 and perform bed to chair transfer with max assist x 2 and BUE support. Pt requires several cues to stay oriented to task with exercises. Will continue to progress patient as able. Continue to recommend SNF at discharge.  -     Row Name 04/05/21 1305          Therapy Assessment/Plan (PT)    Rehab Potential (PT)  fair, will monitor progress closely  -CS     Criteria for Skilled Interventions Met (PT)  yes;meets criteria;skilled treatment is necessary  -     Row Name 04/05/21 1305          Vital Signs    Pre Systolic BP Rehab  140  -CS     Pre Treatment Diastolic BP  73  -CS     Post Systolic BP Rehab  163  -CS     Post Treatment Diastolic BP  95  -CS     Row Name 04/05/21 1305          Positioning and Restraints    Pre-Treatment Position  in bed  -CS     Post Treatment Position  chair  -CS     In Chair  notified nsg;reclined;call light within reach;encouraged to call for assist;exit alarm on;legs elevated;with OT  -CS       User Key  (r) = Recorded By, (t) = Taken By, (c) = Cosigned By    Initials Name Provider Type    Yoana Gunn, FATOU Physical Therapist        Outcome  Measures     Row Name 04/05/21 1305          How much help from another person do you currently need...    Turning from your back to your side while in flat bed without using bedrails?  2  -CS     Moving from lying on back to sitting on the side of a flat bed without bedrails?  2  -CS     Moving to and from a bed to a chair (including a wheelchair)?  2  -CS     Standing up from a chair using your arms (e.g., wheelchair, bedside chair)?  2  -CS     Climbing 3-5 steps with a railing?  1  -CS     To walk in hospital room?  2  -CS     AM-PAC 6 Clicks Score (PT)  11  -CS     Row Name 04/05/21 1305          Modified [place] Scale    Modified [place] Scale  4 - Moderately severe disability.  Unable to walk without assistance, and unable to attend to own bodily needs without assistance.  -CS     Row Name 04/05/21 1305          Functional Assessment    Outcome Measure Options  AM-PAC 6 Clicks Basic Mobility (PT);Modified [place]  -CS       User Key  (r) = Recorded By, (t) = Taken By, (c) = Cosigned By    Initials Name Provider Type    Yoana Gunn PT Physical Therapist        Physical Therapy Education                 Title: PT OT SLP Therapies (In Progress)     Topic: Physical Therapy (Done)     Point: Mobility training (Done)     Learning Progress Summary           Patient Acceptance, E,D, VU,NR by  at 4/5/2021 1305    Comment: Educated patient on bed mobility sequencing, safe hand placement with transfers, bed to chair transfer sequencing, ther ex, and plan for progression of care.    Acceptance, E,D, NL,NR by LF at 4/2/2021 1530    Acceptance, E, NR by AS at 3/29/2021 1038    Acceptance, E, NR by SC at 3/28/2021 1010    Comment: REVIEWED BENEFITS OF ACTIVITY                   Point: Home exercise program (Done)     Learning Progress Summary           Patient Acceptance, E,D, VU,NR by  at 4/5/2021 1305    Comment: Educated patient on bed mobility sequencing, safe hand placement with transfers, bed to chair  transfer sequencing, ther ex, and plan for progression of care.    Acceptance, E,D, NL,NR by  at 4/2/2021 1530    Acceptance, E, NR by AS at 3/29/2021 1038    Acceptance, E, NR by SC at 3/28/2021 1010    Comment: REVIEWED BENEFITS OF ACTIVITY                   Point: Body mechanics (Done)     Learning Progress Summary           Patient Acceptance, E,D, VU,NR by  at 4/5/2021 1305    Comment: Educated patient on bed mobility sequencing, safe hand placement with transfers, bed to chair transfer sequencing, ther ex, and plan for progression of care.    Acceptance, E,D, NL,NR by  at 4/2/2021 1530    Acceptance, E, NR by AS at 3/29/2021 1038    Acceptance, E, NR by SC at 3/28/2021 1010    Comment: REVIEWED BENEFITS OF ACTIVITY                   Point: Precautions (Done)     Learning Progress Summary           Patient Acceptance, E,D, VU,NR by  at 4/5/2021 1305    Comment: Educated patient on bed mobility sequencing, safe hand placement with transfers, bed to chair transfer sequencing, ther ex, and plan for progression of care.    Acceptance, E,D, NL,NR by  at 4/2/2021 1530    Acceptance, E, NR by AS at 3/29/2021 1038    Acceptance, E, NR by SC at 3/28/2021 1010    Comment: REVIEWED BENEFITS OF ACTIVITY                               User Key     Initials Effective Dates Name Provider Type Discipline    SC 06/19/15 -  Bryce Bran, PT Physical Therapist PT    AS 06/22/15 -  Annette Ramirez, PTA Physical Therapy Assistant PT     06/08/18 -  Nava Wilburn, PT Physical Therapist PT     03/26/19 -  Yoana Gramajo, PT Physical Therapist PT              PT Recommendation and Plan  Planned Therapy Interventions (PT): balance training, bed mobility training, gait training, home exercise program, neuromuscular re-education, patient/family education, strengthening, transfer training  Plan of Care Reviewed With: patient  Progress: no change  Outcome Summary: Pt required max assist x 2 for supine to sit.  Pt able to stand with BUE assist with mod assist x 2 and perform bed to chair transfer with max assist x 2 and BUE support. Pt requires several cues to stay oriented to task with exercises. Will continue to progress patient as able. Continue to recommend SNF at discharge.     Time Calculation:   PT Charges     Row Name 04/05/21 1305             Time Calculation    Start Time  1305  -CS      PT Received On  04/05/21  -CS         Time Calculation- PT    Total Timed Code Minutes- PT  18 minute(s)  -CS         Timed Charges    55544 - PT Therapeutic Exercise Minutes  6  -CS      54600 - PT Therapeutic Activity Minutes  12  -CS        User Key  (r) = Recorded By, (t) = Taken By, (c) = Cosigned By    Initials Name Provider Type    CS Yoana Gramajo, FATOU Physical Therapist        Therapy Charges for Today     Code Description Service Date Service Provider Modifiers Qty    [id number]  PT THERAPEUTIC ACT EA 15 MIN 4/5/2021 Yoana Gramajo, PT GP 1          PT G-Codes  Outcome Measure Options: AM-PAC 6 Clicks Basic Mobility (PT), Modified [place]  AM-PAC 6 Clicks Score (PT): 11  AM-PAC 6 Clicks Score (OT): 8  Modified [place] Scale: 4 - Moderately severe disability.  Unable to walk without assistance, and unable to attend to own bodily needs without assistance.    Yoana Gramajo PT  4/5/2021

## 2021-04-05 NOTE — PROGRESS NOTES
Continued Stay Note  [facility]     Patient Name: Sebastian Winter  MRN: [id number]  Today's Date: 4/5/2021    Admit Date: 3/26/2021    Discharge Plan     Row Name 04/05/21 1102       Plan    Plan  [facility]    Patient/Family in Agreement with Plan  yes    Plan Comments  Spoke with Jozef (daughter) by phone. Plan is still [facility] when accepted. Amnda from [facility] is supposed to visit the patient today in the hospital. CM will continue to follow.    Final Discharge Disposition Code  03 - skilled nursing facility (SNF)    Row Name 04/05/21 0916       Plan    Plan  [facility]    Patient/Family in Agreement with Plan  yes    Plan Comments  CM called Jayne at [facility] and left a voicemail asking for a determination on the patient. CM will continue to follow.    Final Discharge Disposition Code  04 - intermediate care facility        Discharge Codes    No documentation.             Hipolito Thompson RN

## 2021-04-05 NOTE — PLAN OF CARE
Goal Outcome Evaluation:  Plan of Care Reviewed With: patient  Progress: improving  Outcome Summary: Pt requires maxA to roll in bed for chantell care and dependence for completion of chantell care. Pt maxAx2 to advance to EOB and required maxAx2 for bed to chair transfer. Pt sat supported in chair to complete self feeding. OT issued scoop plate and T handle cup in addition to large  utensils. Pt requires extra time/effort and Francis for all self feeding. Continue IP OT per POC.

## 2021-04-05 NOTE — PROGRESS NOTES
Continued Stay Note  [facility]     Patient Name: Sebastian Winter  MRN: [id number]  Today's Date: 4/5/2021    Admit Date: 3/26/2021    Discharge Plan     Row Name 04/05/21 0916       Plan    Plan  [facility]    Patient/Family in Agreement with Plan  yes    Plan Comments  CM called Ajyne at [facility] and left a voicemail asking for a determination on the patient. CM will continue to follow.    Final Discharge Disposition Code  04 - intermediate care facility        Discharge Codes    No documentation.             Hipolito Thompson RN

## 2021-04-05 NOTE — THERAPY TREATMENT NOTE
Patient Name: Sebastian Winter  : 1940    MRN: [id number]                              Today's Date: 2021       Admit Date: 3/26/2021    Visit Dx:     ICD-10-CM ICD-9-CM   1. Aphasia  R47.01 784.3   2. Acute left-sided weakness  R53.1 728.87   3. Oropharyngeal dysphagia  R13.12 787.22   4. Dysarthria  R47.1 784.51     Patient Active Problem List   Diagnosis   • Acute left-sided weakness   • Dysarthria   • Dementia without behavioral disturbance (CMS/HCC)   • Hypothyroidism (acquired)   • GERD without esophagitis   • Cardiomyopathy (CMS/HCC)   • Essential hypertension   • Dyslipidemia     Past Medical History:   Diagnosis Date   • Dementia (CMS/HCC)    • Depression    • GERD (gastroesophageal reflux disease)    • Hypothyroid      Past Surgical History:   Procedure Laterality Date   • JOINT REPLACEMENT       General Information     Row Name 21 1434          OT Time and Intention    Document Type  therapy note (daily note)  -     Mode of Treatment  occupational therapy  -     Row Name 21 1434          General Information    Existing Precautions/Restrictions  fall  -     Barriers to Rehab  cognitive status;medically complex;visual deficit  -     Row Name 21 1434          Cognition    Orientation Status (Cognition)  oriented to;person;disoriented to;place;situation;time  -     Row Name 21 1434          Safety Issues, Functional Mobility    Safety Issues Affecting Function (Mobility)  safety precautions follow-through/compliance;safety precaution awareness;insight into deficits/self-awareness;judgment;problem-solving;ability to follow commands;sequencing abilities  -     Impairments Affecting Function (Mobility)  endurance/activity tolerance;strength;range of motion (ROM);postural/trunk control;balance;coordination;motor control;cognition;grasp  -     Cognitive Impairments, Mobility Safety/Performance  attention;awareness, need for assistance;insight into  deficits/self-awareness;judgment;problem-solving/reasoning;safety precaution awareness;sequencing abilities;safety precaution follow-through  -       User Key  (r) = Recorded By, (t) = Taken By, (c) = Cosigned By    Initials Name Provider Type    HK Romelia Nguyen, OT Occupational Therapist          Mobility/ADL's     Row Name 04/05/21 1437          Bed Mobility    Bed Mobility  rolling right;supine-sit;scooting/bridging  -HK     Rolling Right [place] (Bed Mobility)  verbal cues;maximum assist (25% patient effort)  -HK     Scooting/Bridging [place] (Bed Mobility)  verbal cues;maximum assist (25% patient effort)  -HK     Supine-Sit [place] (Bed Mobility)  verbal cues;maximum assist (25% patient effort);2 person assist  -HK     Bed Mobility, Safety Issues  cognitive deficits limit understanding;decreased use of arms for pushing/pulling;decreased use of legs for bridging/pushing;impaired trunk control for bed mobility  -HK     Assistive Device (Bed Mobility)  bed rails;draw sheet;head of bed elevated  -HK     Comment (Bed Mobility)  Pt requries increased processing time for all commands. maxA for all bed mobility.  -     Row Name 04/05/21 1437          Transfers    Transfers  sit-stand transfer;bed-chair transfer  -     Comment (Transfers)  Verbal cues for safe hand placement and sequencing. Limtied by posterior lean in standing.  -HK     Bed-Chair [place] (Transfers)  verbal cues;maximum assist (25% patient effort);2 person assist  -HK     Assistive Device (Bed-Chair Transfers)  other (see comments) B UE support  -HK     Sit-Stand [place] (Transfers)  verbal cues;moderate assist (50% patient effort);2 person assist  -HK     Row Name 04/05/21 1437          Sit-Stand Transfer    Assistive Device (Sit-Stand Transfers)  other (see comments) B UE support  -     Row Name 04/05/21 1437          Functional Mobility    Functional Mobility- Ind. Level  not appropriate to assess  -     Row Name  04/05/21 1437          Activities of Daily Living    BADL Assessment/Intervention  feeding  -HK     Row Name 04/05/21 1437          Self-Feeding Assessment/Training    [place] Level (Feeding)  minimum assist (75% patient effort);liquids to mouth;scoop food and bring to mouth  -HK     Position (Self-Feeding)  supported sitting  -HK     Comment (Feeding)  OT issued T handle cup and scoop plate. Pt able to complete all self feeding with use of large  utensils, scoop plate, and T handle cup. Francis as well as verbal cues this date for feeding.  -HK     Row Name 04/05/21 1437          Toileting Assessment/Training    [place] Level (Toileting)  dependent (less than 25% patient effort)  -HK     Position (Toileting)  supine  -HK     Comment (Toileting)  Pt with noted bowel incontinence during bed mobility. Dependent for all chantell care.  -       User Key  (r) = Recorded By, (t) = Taken By, (c) = Cosigned By    Initials Name Provider Type    Romelia Antonio OT Occupational Therapist        Obj/Interventions     Row Name 04/05/21 1441          Sensory Assessment (Somatosensory)    Sensory Assessment (Somatosensory)  sensation intact  -     Row Name 04/05/21 1441          Balance    Balance Assessment  sitting static balance;sitting dynamic balance;standing static balance  -HK     Static Sitting Balance  mild impairment;unsupported;sitting, edge of bed  -HK     Dynamic Sitting Balance  moderate impairment;unsupported;sitting, edge of bed  -HK     Static Standing Balance  moderate impairment;supported;standing  -HK     Dynamic Standing Balance  severe impairment;supported;standing  -HK     Balance Interventions  sitting;supported;dynamic;dynamic reaching;occupation based/functional task  -       User Key  (r) = Recorded By, (t) = Taken By, (c) = Cosigned By    Initials Name Provider Type    Romelia Antonio, PACO Occupational Therapist        Goals/Plan    No documentation.       Clinical Impression     Row Name  04/05/21 1442          Pain Assessment    Additional Documentation  Pain Scale: Numbers Pre/Post-Treatment (Group)  -HK     Row Name 04/05/21 1442          Pain Scale: Numbers Pre/Post-Treatment    Pretreatment Pain Rating  0/10 - no pain  -HK     Posttreatment Pain Rating  0/10 - no pain  -HK     Pain Intervention(s)  Ambulation/increased activity;Repositioned  -HK     Row Name 04/05/21 1442          Plan of Care Review    Plan of Care Reviewed With  patient  -HK     Progress  improving  -HK     Outcome Summary  Pt requires maxA to roll in bed for chantell care and dependence for completion of chantell care. Pt maxAx2 to advance to EOB and required maxAx2 for bed to chair transfer. Pt sat supported in chair to complete self feeding. OT issued scoop plate and T handle cup in addition to large  utensils. Pt requires extra time/effort and Francis for all self feeding. Continue IP OT per POC.  -HK     Row Name 04/05/21 1442          Vital Signs    Pre Systolic BP Rehab  140  -HK     Pre Treatment Diastolic BP  73  -HK     Post Systolic BP Rehab  16  -HK     Post Treatment Diastolic BP  95  -HK     O2 Delivery Pre Treatment  room air  -HK     O2 Delivery Intra Treatment  room air  -HK     O2 Delivery Post Treatment  room air  -HK     Pre Patient Position  Supine  -HK     Intra Patient Position  Standing  -HK     Post Patient Position  Sitting  -HK     Row Name 04/05/21 1442          Positioning and Restraints    Pre-Treatment Position  in bed  -HK     Post Treatment Position  chair  -HK     In Chair  notified nsg;reclined;call light within reach;encouraged to call for assist;exit alarm on  -HK       User Key  (r) = Recorded By, (t) = Taken By, (c) = Cosigned By    Initials Name Provider Type     Romelia Nguyen, OT Occupational Therapist        Outcome Measures     Row Name 04/05/21 1446          How much help from another is currently needed...    Putting on and taking off regular lower body clothing?  1  -HK     Bathing  (including washing, rinsing, and drying)  1  -HK     Toileting (which includes using toilet bed pan or urinal)  1  -HK     Putting on and taking off regular upper body clothing  2  -HK     Taking care of personal grooming (such as brushing teeth)  2  -HK     Eating meals  3  -HK     AM-PAC 6 Clicks Score (OT)  10  -     Row Name 04/05/21 1446          Modified [place] Scale    Modified [place] Scale  4 - Moderately severe disability.  Unable to walk without assistance, and unable to attend to own bodily needs without assistance.  -     Row Name 04/05/21 1446          Functional Assessment    Outcome Measure Options  AM-PAC 6 Clicks Daily Activity (OT)  -       User Key  (r) = Recorded By, (t) = Taken By, (c) = Cosigned By    Initials Name Provider Type     Romelia Nguyen OT Occupational Therapist        Occupational Therapy Education                 Title: PT OT SLP Therapies (In Progress)     Topic: Occupational Therapy (In Progress)     Point: ADL training (Done)     Description:   Instruct learner(s) on proper safety adaptation and remediation techniques during self care or transfers.   Instruct in proper use of assistive devices.              Learning Progress Summary           Patient Acceptance, E,TB,D, VU,NR by  at 4/5/2021 1447    Acceptance, E,TB,D,H, VU,NR by HK at 3/30/2021 1546    Nonacceptance, E, NL by AN at 3/28/2021 0814    Comment: Educated pt on OT role and POC.                   Point: Home exercise program (Not Started)     Description:   Instruct learner(s) on appropriate technique for monitoring, assisting and/or progressing therapeutic exercises/activities.              Learner Progress:  Not documented in this visit.          Point: Precautions (Done)     Description:   Instruct learner(s) on prescribed precautions during self-care and functional transfers.              Learning Progress Summary           Patient Acceptance, E,TB,D, VU,NR by  at 4/5/2021 1447    Acceptance,  E,TB,D,H, VU,NR by  at 3/30/2021 1546                   Point: Body mechanics (Done)     Description:   Instruct learner(s) on proper positioning and spine alignment during self-care, functional mobility activities and/or exercises.              Learning Progress Summary           Patient Acceptance, E,TB,D, VU,NR by HK at 4/5/2021 1447    Acceptance, E,TB,D,H, VU,NR by  at 3/30/2021 1546                               User Key     Initials Effective Dates Name Provider Type Discipline     06/22/15 -  Rhoda Coker, OT Occupational Therapist OT     03/07/18 -  Romelia Nguyen, OT Occupational Therapist OT              OT Recommendation and Plan     Plan of Care Review  Plan of Care Reviewed With: patient  Progress: improving  Outcome Summary: Pt requires maxA to roll in bed for chantell care and dependence for completion of chantell care. Pt maxAx2 to advance to EOB and required maxAx2 for bed to chair transfer. Pt sat supported in chair to complete self feeding. OT issued scoop plate and T handle cup in addition to large  utensils. Pt requires extra time/effort and Francis for all self feeding. Continue IP OT per POC.     Time Calculation:   Time Calculation- OT     Row Name 04/05/21 1305             Time Calculation- OT    OT Start Time  1305  -      OT Received On  04/05/21  -         Timed Charges    39255 - OT Self Care/Mgmt Minutes  30  -        User Key  (r) = Recorded By, (t) = Taken By, (c) = Cosigned By    Initials Name Provider Type     Romelia Nguyen, OT Occupational Therapist        Therapy Charges for Today     Code Description Service Date Service Provider Modifiers Qty    [id number]  OT SELF CARE/MGMT/TRAIN EA 15 MIN 4/5/2021 Romelia Nguyen, OT GO 2               Romelia Nguyen OT  4/5/2021

## 2021-04-05 NOTE — PLAN OF CARE
Goal Outcome Evaluation:  Plan of Care Reviewed With: patient  Progress: no change  Outcome Summary: Pt required max assist x 2 for supine to sit. Pt able to stand with BUE assist with mod assist x 2 and perform bed to chair transfer with max assist x 2 and BUE support. Pt requires several cues to stay oriented to task with exercises. Will continue to progress patient as able. Continue to recommend SNF at discharge.

## 2021-04-05 NOTE — THERAPY TREATMENT NOTE
Acute Care - Speech Language Pathology Treatment Note  [facility]     Patient Name: Sebastian Winter  : 1940  MRN: [id number]  Today's Date: 2021               Admit Date: 3/26/2021     Visit Dx:    ICD-10-CM ICD-9-CM   1. Aphasia  R47.01 784.3   2. Acute left-sided weakness  R53.1 728.87   3. Oropharyngeal dysphagia  R13.12 787.22   4. Dysarthria  R47.1 784.51     Patient Active Problem List   Diagnosis   • Acute left-sided weakness   • Dysarthria   • Dementia without behavioral disturbance (CMS/HCC)   • Hypothyroidism (acquired)   • GERD without esophagitis   • Cardiomyopathy (CMS/HCC)   • Essential hypertension   • Dyslipidemia     Past Medical History:   Diagnosis Date   • Dementia (CMS/HCC)    • Depression    • GERD (gastroesophageal reflux disease)    • Hypothyroid      Past Surgical History:   Procedure Laterality Date   • JOINT REPLACEMENT          SLP EVALUATION (last 72 hours)      SLP SLC Evaluation     Row Name 21 1045                   Communication Assessment/Intervention    Patient/Family/Caregiver Comments/Observations  Not present  (Pended)   -LB        Care Plan Review  care plan/treatment goals reviewed  (Pended)   -LB        Patient Effort  good  (Pended)   -LB        Symptoms Noted During/After Treatment  none  (Pended)   -LB           General Information    Patient Profile Reviewed  yes  (Pended)   -LB           Pain Scale: Numbers Pre/Post-Treatment    Pretreatment Pain Rating  0/10 - no pain  (Pended)   -LB        Posttreatment Pain Rating  0/10 - no pain  (Pended)   -LB           SLP Clinical Impressions    Daily Summary of Progress (SLP)  progress toward functional goals is good  (Pended)   -LB        Barriers to Overall Progress (SLP)  Independent recall of articulation strategies  (Pended)   -LB        [facility] Criteria for Skilled Therapy Interventions Met  yes  (Pended)   -LB        Functional Impact  functional impact in social situations;functional impact in  ADLs;difficulty communicating wants, needs;difficulty communicating in an emergency;difficulty in expressing complex messages;restrictions in personal and social life  (Pended)   -LB        Plan for Continued Treatment (SLP)  Continue to follow treatment goals for dysphagia and language.  (Pended)   -LB           Communication Treatment Objective and Progress Goals (SLP)    Auditory Comprehension Treatment Objectives  Auditory Comprehension Treatment Objectives (Group)  (Pended)   -LB        Verbal Expression Treatment Objectives  Verbal Expression Treatment Objectives (Group)  (Pended)   -LB        Motor Speech/Dysarthria Treatment Objectives  Motor Speech/Dysarthria Treatment Objectives (Group)  (Pended)   -LB        Additional Goals  Additional Goals (Group)  (Pended)   -LB           Auditory Comprehension Treatment Objectives    Words/Phrases/Sentences Selection  words/phrases/sentences, SLP goal 1  (Pended)   -LB        Comprehend Questions Selection  comprehend questions, SLP goal 1  (Pended)   -LB        Follow Directions Selection  follow directions, SLP goal 1  (Pended)   -LB           Words/Phrases/Sentences Goal 1 (SLP)    Improve Ability to Comprehend Words/Phrases/Sentences Through: Goal 1 (SLP)  identify body part;identify familiar objects;80%;with minimal cues (75-90%)  (Pended)   -LB        Time Frame (Identify Objects and Pictures Goal 1, SLP)  short term goal (STG)  (Pended)   -LB           Comprehend Questions Goal 1 (SLP)    Improve Ability to Comprehend Questions Goal 1 (SLP)  simple yes/no questions;80%;with minimal cues (75-90%)  (Pended)   -LB        Time Frame (Comprehend Questions Goal 1, SLP)  short term goal (STG)  (Pended)   -LB           Follow Directions Goal 2 (SLP)    Improve Ability to Follow Directions Goal 1 (SLP)  1 step direction without objects;with minimal cues (75-90%);2 step commands;with moderate cues (50-74%);80%  (Pended)   -LB        Time Frame (Follow Directions Goal 1,  SLP)  short term goal (STG)  (Pended)   -LB           Verbal Expression Treatment Objectives    Word Retrieval Skills Selection  word retrieval, SLP goal 1  (Pended)   -LB        Phrase and Sentence Level Response Selection  phrase and sentence level response, SLP goal 1  (Pended)   -LB           Word Retrieval Skills Goal 1 (SLP)    Improve Word Retrieval Skills By Goal 1 (SLP)  confrontational naming task;responsive naming task;repeating words;completing open ended structured sentence;80%;with minimal cues (75-90%)  (Pended)   -LB        Time Frame (Word Retrieval Goal 1, SLP)  short term goal (STG)  (Pended)   -LB           Motor Speech/Dysarthria Treatment Objectives    Phonation Selection  phonation, SLP goal 1  (Pended)   -LB        Articulation Selection  articulation, SLP goal 1  (Pended)   -LB           Phonation Goal 1 (SLP)    Improve Phonation By Goal 1 (SLP)  using loud speech;80%;with minimal cues (75-90%)  (Pended)   -LB        Time Frame (Phonation Goal 1, SLP)  short term goal (STG)  (Pended)   -LB           Articulation Goal 1 (SLP)    Improve Articulation Goal 1 (SLP)  by over-articulating at word level;80%;with moderate cues (50-74%)  (Pended)   -LB        Time Frame (Articulation Goal 1, SLP)  short term goal (STG)  (Pended)   -LB           Additional Goals    Additional Goal Selection  additional goal, SLP goal 1  (Pended)   -LB           Additional Goal 1 (SLP)    Additional Goal 1, SLP  LTG: Pt will improve communication skills to actively participate in care w/ 80% accuracy and min cues.   (Pended)   -LB        Time Frame (Additional Goal 1, SLP)  by discharge  (Pended)   -LB          User Key  (r) = Recorded By, (t) = Taken By, (c) = Cosigned By    Initials Name Effective Dates    Chuckie Pérez, Speech Therapy Student 03/09/21 -              EDUCATION  The patient has been educated in the following areas:     Dysphagia (Swallowing Impairment).    SLP Recommendation and Plan            Swallow Criteria for Skilled Therapeutic Interventions Met: (P) demonstrates skilled criteria  SLC Criteria for Skilled Therapy Interventions Met: (P) yes              Daily Summary of Progress (SLP): (P) progress toward functional goals is good  Plan for Continued Treatment (SLP): (P) Continue to follow treatment goals for dysphagia and language.             Plan of Care Reviewed With: (P) patient  Progress: (P) improving      SLP GOALS     Row Name 04/05/21 1045             Oral Nutrition/Hydration Goal 1 (SLP)    Oral Nutrition/Hydration Goal 1, SLP  LTG: Pt will demonstrate functional swallow for regular/thin diet with use of compensatory strategies as needed.  (Pended)   -LB      Time Frame (Oral Nutrition/Hydration Goal 1, SLP)  by discharge  (Pended)   -LB      Progress/Outcomes (Oral Nutrition/Hydration Goal 1, SLP)  continuing progress toward goal  (Pended)   -LB         Oral Nutrition/Hydration Goal 2 (SLP)    Oral Nutrition/Hydration Goal 2, SLP  Pt will tolerate current diet of pureed solids and nectar-thick liquids with no s/sx aspiration with 100% accuracy.  (Pended)   -LB      Time Frame (Oral Nutrition/Hydration Goal 2, SLP)  short term goal (STG)  (Pended)   -LB      Barriers (Oral Nutrition/Hydration Goal 2, SLP)  Pt tolerated 3 trials of applesauce via teaspioon w/o s/sx   (Pended)   -LB      Progress/Outcomes (Oral Nutrition/Hydration Goal 2, SLP)  continuing progress toward goal  (Pended)   -LB         Oral Nutrition/Hydration Goal (SLP)    Oral Nutrition/Hydration Goal, SLP  Pt will accept trials of thin liquids with no s/sx aspiration w/ 60% accuracy to determine readiness of diet upgrade.  (Pended)   -LB      Time Frame (Oral Nutrition/Hydration Goal, SLP)  short term goal (STG)  (Pended)   -LB      Barriers (Oral Nutrition/Hydration Goal, SLP)  no s/sx of aspiration  (Pended)   -LB      Progress/Outcomes (Oral Nutrition/Hydration Goal, SLP)  continuing progress toward goal  (Pended)   -LB          Labial Strengthening Goal 1 (SLP)    Activity (Labial Strengthening Goal 1, SLP)  increase labial tone;increase labial sensation/afferent drive  (Pended)   -LB      Increase Labial Tone  labial resistance exercises  (Pended)   -LB      Increase Labial Sensation/Afferent Drive  swallow trials  (Pended)   -LB      [place]/Accuracy (Labial Strengthening Goal 1, SLP)  with moderate cues (50-74% accuracy)  (Pended)   -LB      Time Frame (Labial Strengthening Goal 1, SLP)  short term goal (STG)  (Pended)   -LB      Barriers (Labial Strengthening Goal 1, SLP)  Pt had difficulty completing labial resistance exercises.  (Pended)   -LB      Progress/Outcomes (Labial Strengthening Goal 1, SLP)  goal ongoing  (Pended)   -LB         Lingual Strengthening Goal 1 (SLP)    Activity (Lingual Strengthening Goal 1, SLP)  increase lingual tone/sensation/control/coordination/movement;increase tongue back strength  (Pended)   -LB      Increase Lingual Tone/Sensation/Control/Coordination/Movement  lingual movement exercises;lingual resistance exercises  (Pended)   -LB      Increase Tongue Back Strength  lingual resistance exercises  (Pended)   -LB      [place]/Accuracy (Lingual Strengthening Goal 1, SLP)  with moderate cues (50-74% accuracy)  (Pended)   -LB      Time Frame (Lingual Strengthening Goal 1, SLP)  short term goal (STG)  (Pended)   -LB      Barriers (Lingual Strengthening Goal 1, SLP)  Pt pushed tongue against fork (tongue needed flater surface) for tongue strengthening exercise.  (Pended)   -LB      Progress/Outcomes (Lingual Strengthening Goal 1, SLP)  continuing progress toward goal  (Pended)   -LB         Pharyngeal Strengthening Exercise Goal 1 (SLP)    Activity (Pharyngeal Strengthening Goal 1, SLP)  increase timing;increase superior movement of the hyolaryngeal complex;increase anterior movement of the hyolaryngeal complex;increase closure at entrance to airway/closure of airway at glottis;increase  squeeze/positive pressure generation;increase tongue base retraction  (Pended)   -LB      Increase Timing  prepping - 3 second prep or suck swallow or 3-step swallow  (Pended)   -LB      Increase Superior Movement of the Hyolaryngeal Complex  falsetto  (Pended)   -LB      Increase Anterior Movement of the Hyolaryngeal Complex  chin tuck against resistance (CTAR)  (Pended)   -LB      Increase Closure at Entrance to Airway/Closure of Airway at Glottis  super-supraglottic swallow  (Pended)   -LB      Increase Squeeze/Positive Pressure Generation  effortful pitch glide (falsetto + pharyngeal squeeze)  (Pended)   -LB      Increase Tongue Base Retraction  hard effortful swallow  (Pended)   -LB      [place]/Accuracy (Pharyngeal Strengthening Goal 1, SLP)  with moderate cues (50-74% accuracy)  (Pended)   -LB      Time Frame (Pharyngeal Strengthening Goal 1, SLP)  short term goal (STG)  (Pended)   -LB      Barriers (Pharyngeal Strengthening Goal 1, SLP)  Pt completed 3 trials of CTAR, 3 second prep, and falsetto pitch glide. Pt unable to complete effortful swallow trials.   (Pended)   -LB         Swallow Compensatory Strategies Goal 1 (SLP)    Activity (Swallow Compensatory Strategies/Techniques Goal 1, SLP)  compensatory strategies;postural techniques;small bites;small cup sips  (Pended)   -LB      [place]/Accuracy (Swallow Compensatory Strategies/Techniques Goal 1, SLP)  with minimal cues (75-90% accuracy)  (Pended)   -LB      Time Frame (Swallow Compensatory Strategies/Techniques Goal 1, SLP)  short term goal (STG)  (Pended)   -LB      Barriers (Swallow Compensatory Strategies/Techniques Goal 1, SLP)  Pt unable to independently recall strategies.   (Pended)   -LB      Progress/Outcomes (Swallow Compensatory Strategies/Techniques Goal 1, SLP)  progress slower than expected  (Pended)   -LB         Words/Phrases/Sentences Goal 1 (SLP)    Improve Ability to Comprehend Words/Phrases/Sentences Through: Goal 1 (SLP)   identify body part;identify familiar objects;80%;with minimal cues (75-90%)  (Pended)   -LB      Time Frame (Identify Objects and Pictures Goal 1, SLP)  short term goal (STG)  (Pended)   -LB      Progress/Outcomes (Identify Objects and Pictures Goal 1, SLP)  goal ongoing  (Pended)   -LB         Comprehend Questions Goal 1 (SLP)    Improve Ability to Comprehend Questions Goal 1 (SLP)  simple yes/no questions;80%;with minimal cues (75-90%)  (Pended)   -LB      Time Frame (Comprehend Questions Goal 1, SLP)  short term goal (STG)  (Pended)   -LB      Progress (Ability to Comprehend Questions Goal 1, SLP)  50%;with moderate cues (50-74%)  (Pended)   -LB      Progress/Outcomes (Comprehend Questions Goal 1, SLP)  progress slower than expected  (Pended)   -LB         Follow Directions Goal 2 (SLP)    Improve Ability to Follow Directions Goal 1 (SLP)  1 step direction without objects;with minimal cues (75-90%);2 step commands;with moderate cues (50-74%);80%  (Pended)   -LB      Time Frame (Follow Directions Goal 1, SLP)  short term goal (STG)  (Pended)   -LB      Progress (Ability to Follow Directions Goal 1, SLP)  60%;with moderate cues (50-74%)  (Pended)   -LB      Progress/Outcomes (Follow Directions Goal 1, SLP)  continuing progress toward goal  (Pended)   -LB      Comment (Follow Directions Goal 1, SLP)  Pt able to follow directions for treatment exercises and compensatory strategies.  (Pended)   -LB         Word Retrieval Skills Goal 1 (SLP)    Improve Word Retrieval Skills By Goal 1 (SLP)  confrontational naming task;responsive naming task;repeating words;completing open ended structured sentence;80%;with minimal cues (75-90%)  (Pended)   -LB      Time Frame (Word Retrieval Goal 1, SLP)  short term goal (STG)  (Pended)   -LB      Progress/Outcomes (Word Retrieval Goal 1, SLP)  goal ongoing  (Pended)   -LB         Phonation Goal 1 (SLP)    Improve Phonation By Goal 1 (SLP)  using loud speech;80%;with minimal cues  (75-90%)  (Pended)   -LB      Time Frame (Phonation Goal 1, SLP)  short term goal (STG)  (Pended)   -LB         Articulation Goal 1 (SLP)    Improve Articulation Goal 1 (SLP)  by over-articulating at word level;80%;with moderate cues (50-74%)  (Pended)   -LB      Time Frame (Articulation Goal 1, SLP)  short term goal (STG)  (Pended)   -LB      Progress (Articulation Goal 1, SLP)  60%;with maximum cues (25-49%)  (Pended)   -LB      Progress/Outcomes (Articulation Goal 1, SLP)  progress slower than expected  (Pended)   -LB      Comment (Articulation Goal 1, SLP)  Pt required continuous prompts to use overarticulated speech.  (Pended)   -LB         Additional Goal 1 (SLP)    Additional Goal 1, SLP  LTG: Pt will improve communication skills to actively participate in care w/ 80% accuracy and min cues.   (Pended)   -LB      Time Frame (Additional Goal 1, SLP)  by discharge  (Pended)   -LB      Progress/Outcomes (Additional Goal 1, SLP)  continuing progress toward goal  (Pended)   -LB        User Key  (r) = Recorded By, (t) = Taken By, (c) = Cosigned By    Initials Name Provider Type    Chuckie Pérez Speech Therapy Student Speech Therapy Student                  Time Calculation:     Time Calculation- SLP     Row Name 04/05/21 1139             Time Calculation- SLP    SLP Start Time  1045  (Pended)   -LB      SLP Received On  04/05/21  (Pended)   -LB        User Key  (r) = Recorded By, (t) = Taken By, (c) = Cosigned By    Initials Name Provider Type    Chuckie Pérez Speech Therapy Student Speech Therapy Student          Therapy Charges for Today     Code Description Service Date Service Provider Modifiers Qty    [id number]  ST TREATMENT SPEECH 1 4/5/2021 Chuckie Hunt Speech Therapy Student GN 1    [id number] HC ST TREATMENT SWALLOW 2 4/5/2021 Chuckie Hunt Speech Therapy Student GN 1                     Chuckie Hunt Speech Therapy Student  4/5/2021 and Acute Care - Speech Language Pathology   Swallow  Treatment Note  [place]     Patient Name: Sebastian Winter  : 1940  MRN: [id number]  Today's Date: 2021               Admit Date: 3/26/2021    Visit Dx:     ICD-10-CM ICD-9-CM   1. Aphasia  R47.01 784.3   2. Acute left-sided weakness  R53.1 728.87   3. Oropharyngeal dysphagia  R13.12 787.22   4. Dysarthria  R47.1 784.51     Patient Active Problem List   Diagnosis   • Acute left-sided weakness   • Dysarthria   • Dementia without behavioral disturbance (CMS/HCC)   • Hypothyroidism (acquired)   • GERD without esophagitis   • Cardiomyopathy (CMS/HCC)   • Essential hypertension   • Dyslipidemia     Past Medical History:   Diagnosis Date   • Dementia (CMS/HCC)    • Depression    • GERD (gastroesophageal reflux disease)    • Hypothyroid      Past Surgical History:   Procedure Laterality Date   • JOINT REPLACEMENT          SWALLOW EVALUATION (last 72 hours)      SLP Adult Swallow Evaluation     Row Name 21 1045                   Rehab Evaluation    Document Type  therapy note (daily note)  (Pended)   -LB        Subjective Information  no complaints  (Pended)   -LB        Patient Observations  alert;cooperative;agree to therapy  (Pended)   -LB           Pain    Additional Documentation  Pain Scale: Numbers Pre/Post-Treatment (Group)  (Pended)   -LB           Oral Motor Structure and Function    Dentition Assessment  natural, present and adequate  (Pended)   -LB        Secretion Management  WNL/WFL  (Pended)   -LB        Mucosal Quality  moist, healthy  (Pended)   -LB           Oral Musculature and Cranial Nerve Assessment    Oral Motor General Assessment  generalized oral motor weakness;oral labial or buccal impairment  (Pended)   -LB        Oral Labial or Buccal Impairment, Detail, Cranial Nerve VII (Facial):  right labial droop;reduced ROM;reduced strength bilaterally  (Pended)   -LB           Respiratory    Respiratory Status  WFL  (Pended)   -LB           Clinical Impression    SLP Swallowing  Diagnosis  mod-severe;oral dysphagia;mild-moderate;pharyngeal dysphagia  (Pended)   -LB        Functional Impact  risk of aspiration/pneumonia  (Pended)   -LB        Rehab Potential/Prognosis, Swallowing  good, to achieve stated therapy goals  (Pended)   -LB        Swallow Criteria for Skilled Therapeutic Interventions Met  demonstrates skilled criteria  (Pended)   -LB           Labial Strengthening Goal 1 (SLP)    Barriers (Labial Strengthening Goal 1, SLP)  Pt had difficulty completing labial resistance exercises.  (Pended)   -LB           Swallow Compensatory Strategies Goal 1 (SLP)    Barriers (Swallow Compensatory Strategies/Techniques Goal 1, SLP)  Pt unable to independently recall strategies.   (Pended)   -LB        Progress/Outcomes (Swallow Compensatory Strategies/Techniques Goal 1, SLP)  progress slower than expected  (Pended)   -LB          User Key  (r) = Recorded By, (t) = Taken By, (c) = Cosigned By    Initials Name Effective Dates    Chuckie Pérez, Speech Therapy Student 03/09/21 -           EDUCATION  The patient has been educated in the following areas:   Dysphagia (Swallowing Impairment).    SLP Recommendation and Plan  SLP Swallowing Diagnosis: (P) mod-severe, oral dysphagia, mild-moderate, pharyngeal dysphagia                    Swallow Criteria for Skilled Therapeutic Interventions Met: (P) demonstrates skilled criteria     Rehab Potential/Prognosis, Swallowing: (P) good, to achieve stated therapy goals           Daily Summary of Progress (SLP): (P) progress toward functional goals is good    Plan for Continued Treatment (SLP): (P) Continue to follow treatment goals for dysphagia and language.              Plan of Care Reviewed With: (P) patient  Progress: (P) improving    SLP GOALS     Row Name 04/05/21 1045             Oral Nutrition/Hydration Goal 1 (SLP)    Oral Nutrition/Hydration Goal 1, SLP  LTG: Pt will demonstrate functional swallow for regular/thin diet with use of compensatory  strategies as needed.  (Pended)   -LB      Time Frame (Oral Nutrition/Hydration Goal 1, SLP)  by discharge  (Pended)   -LB      Progress/Outcomes (Oral Nutrition/Hydration Goal 1, SLP)  continuing progress toward goal  (Pended)   -LB         Oral Nutrition/Hydration Goal 2 (SLP)    Oral Nutrition/Hydration Goal 2, SLP  Pt will tolerate current diet of pureed solids and nectar-thick liquids with no s/sx aspiration with 100% accuracy.  (Pended)   -LB      Time Frame (Oral Nutrition/Hydration Goal 2, SLP)  short term goal (STG)  (Pended)   -LB      Barriers (Oral Nutrition/Hydration Goal 2, SLP)  Pt tolerated 3 trials of applesauce via teaspioon w/o s/sx   (Pended)   -LB      Progress/Outcomes (Oral Nutrition/Hydration Goal 2, SLP)  continuing progress toward goal  (Pended)   -LB         Oral Nutrition/Hydration Goal (SLP)    Oral Nutrition/Hydration Goal, SLP  Pt will accept trials of thin liquids with no s/sx aspiration w/ 60% accuracy to determine readiness of diet upgrade.  (Pended)   -LB      Time Frame (Oral Nutrition/Hydration Goal, SLP)  short term goal (STG)  (Pended)   -LB      Barriers (Oral Nutrition/Hydration Goal, SLP)  no s/sx of aspiration  (Pended)   -LB      Progress/Outcomes (Oral Nutrition/Hydration Goal, SLP)  continuing progress toward goal  (Pended)   -LB         Labial Strengthening Goal 1 (SLP)    Activity (Labial Strengthening Goal 1, SLP)  increase labial tone;increase labial sensation/afferent drive  (Pended)   -LB      Increase Labial Tone  labial resistance exercises  (Pended)   -LB      Increase Labial Sensation/Afferent Drive  swallow trials  (Pended)   -LB      [place]/Accuracy (Labial Strengthening Goal 1, SLP)  with moderate cues (50-74% accuracy)  (Pended)   -LB      Time Frame (Labial Strengthening Goal 1, SLP)  short term goal (STG)  (Pended)   -LB      Barriers (Labial Strengthening Goal 1, SLP)  Pt had difficulty completing labial resistance exercises.  (Pended)   -LB       Progress/Outcomes (Labial Strengthening Goal 1, SLP)  goal ongoing  (Pended)   -LB         Lingual Strengthening Goal 1 (SLP)    Activity (Lingual Strengthening Goal 1, SLP)  increase lingual tone/sensation/control/coordination/movement;increase tongue back strength  (Pended)   -LB      Increase Lingual Tone/Sensation/Control/Coordination/Movement  lingual movement exercises;lingual resistance exercises  (Pended)   -LB      Increase Tongue Back Strength  lingual resistance exercises  (Pended)   -LB      [place]/Accuracy (Lingual Strengthening Goal 1, SLP)  with moderate cues (50-74% accuracy)  (Pended)   -LB      Time Frame (Lingual Strengthening Goal 1, SLP)  short term goal (STG)  (Pended)   -LB      Barriers (Lingual Strengthening Goal 1, SLP)  Pt pushed tongue against fork (tongue needed flater surface) for tongue strengthening exercise.  (Pended)   -LB      Progress/Outcomes (Lingual Strengthening Goal 1, SLP)  continuing progress toward goal  (Pended)   -LB         Pharyngeal Strengthening Exercise Goal 1 (SLP)    Activity (Pharyngeal Strengthening Goal 1, SLP)  increase timing;increase superior movement of the hyolaryngeal complex;increase anterior movement of the hyolaryngeal complex;increase closure at entrance to airway/closure of airway at glottis;increase squeeze/positive pressure generation;increase tongue base retraction  (Pended)   -LB      Increase Timing  prepping - 3 second prep or suck swallow or 3-step swallow  (Pended)   -LB      Increase Superior Movement of the Hyolaryngeal Complex  falsetto  (Pended)   -LB      Increase Anterior Movement of the Hyolaryngeal Complex  chin tuck against resistance (CTAR)  (Pended)   -LB      Increase Closure at Entrance to Airway/Closure of Airway at Glottis  super-supraglottic swallow  (Pended)   -LB      Increase Squeeze/Positive Pressure Generation  effortful pitch glide (falsetto + pharyngeal squeeze)  (Pended)   -LB      Increase Tongue Base Retraction   hard effortful swallow  (Pended)   -LB      [place]/Accuracy (Pharyngeal Strengthening Goal 1, SLP)  with moderate cues (50-74% accuracy)  (Pended)   -LB      Time Frame (Pharyngeal Strengthening Goal 1, SLP)  short term goal (STG)  (Pended)   -LB      Barriers (Pharyngeal Strengthening Goal 1, SLP)  Pt completed 3 trials of CTAR, 3 second prep, and falsetto pitch glide. Pt unable to complete effortful swallow trials.   (Pended)   -LB         Swallow Compensatory Strategies Goal 1 (SLP)    Activity (Swallow Compensatory Strategies/Techniques Goal 1, SLP)  compensatory strategies;postural techniques;small bites;small cup sips  (Pended)   -LB      [place]/Accuracy (Swallow Compensatory Strategies/Techniques Goal 1, SLP)  with minimal cues (75-90% accuracy)  (Pended)   -LB      Time Frame (Swallow Compensatory Strategies/Techniques Goal 1, SLP)  short term goal (STG)  (Pended)   -LB      Barriers (Swallow Compensatory Strategies/Techniques Goal 1, SLP)  Pt unable to independently recall strategies.   (Pended)   -LB      Progress/Outcomes (Swallow Compensatory Strategies/Techniques Goal 1, SLP)  progress slower than expected  (Pended)   -LB         Words/Phrases/Sentences Goal 1 (SLP)    Improve Ability to Comprehend Words/Phrases/Sentences Through: Goal 1 (SLP)  identify body part;identify familiar objects;80%;with minimal cues (75-90%)  (Pended)   -LB      Time Frame (Identify Objects and Pictures Goal 1, SLP)  short term goal (STG)  (Pended)   -LB      Progress/Outcomes (Identify Objects and Pictures Goal 1, SLP)  goal ongoing  (Pended)   -LB         Comprehend Questions Goal 1 (SLP)    Improve Ability to Comprehend Questions Goal 1 (SLP)  simple yes/no questions;80%;with minimal cues (75-90%)  (Pended)   -LB      Time Frame (Comprehend Questions Goal 1, SLP)  short term goal (STG)  (Pended)   -LB      Progress (Ability to Comprehend Questions Goal 1, SLP)  50%;with moderate cues (50-74%)  (Pended)   -LB       Progress/Outcomes (Comprehend Questions Goal 1, SLP)  progress slower than expected  (Pended)   -LB         Follow Directions Goal 2 (SLP)    Improve Ability to Follow Directions Goal 1 (SLP)  1 step direction without objects;with minimal cues (75-90%);2 step commands;with moderate cues (50-74%);80%  (Pended)   -LB      Time Frame (Follow Directions Goal 1, SLP)  short term goal (STG)  (Pended)   -LB      Progress (Ability to Follow Directions Goal 1, SLP)  60%;with moderate cues (50-74%)  (Pended)   -LB      Progress/Outcomes (Follow Directions Goal 1, SLP)  continuing progress toward goal  (Pended)   -LB      Comment (Follow Directions Goal 1, SLP)  Pt able to follow directions for treatment exercises and compensatory strategies.  (Pended)   -LB         Word Retrieval Skills Goal 1 (SLP)    Improve Word Retrieval Skills By Goal 1 (SLP)  confrontational naming task;responsive naming task;repeating words;completing open ended structured sentence;80%;with minimal cues (75-90%)  (Pended)   -LB      Time Frame (Word Retrieval Goal 1, SLP)  short term goal (STG)  (Pended)   -LB      Progress/Outcomes (Word Retrieval Goal 1, SLP)  goal ongoing  (Pended)   -LB         Phonation Goal 1 (SLP)    Improve Phonation By Goal 1 (SLP)  using loud speech;80%;with minimal cues (75-90%)  (Pended)   -LB      Time Frame (Phonation Goal 1, SLP)  short term goal (STG)  (Pended)   -LB         Articulation Goal 1 (SLP)    Improve Articulation Goal 1 (SLP)  by over-articulating at word level;80%;with moderate cues (50-74%)  (Pended)   -LB      Time Frame (Articulation Goal 1, SLP)  short term goal (STG)  (Pended)   -LB      Progress (Articulation Goal 1, SLP)  60%;with maximum cues (25-49%)  (Pended)   -LB      Progress/Outcomes (Articulation Goal 1, SLP)  progress slower than expected  (Pended)   -LB      Comment (Articulation Goal 1, SLP)  Pt required continuous prompts to use overarticulated speech.  (Pended)   -LB         Additional  Goal 1 (SLP)    Additional Goal 1, SLP  LTG: Pt will improve communication skills to actively participate in care w/ 80% accuracy and min cues.   (Pended)   -LB      Time Frame (Additional Goal 1, SLP)  by discharge  (Pended)   -LB      Progress/Outcomes (Additional Goal 1, SLP)  continuing progress toward goal  (Pended)   -LB        User Key  (r) = Recorded By, (t) = Taken By, (c) = Cosigned By    Initials Name Provider Type    Chuckie Pérez, Speech Therapy Student Speech Therapy Student             Time Calculation:   Time Calculation- SLP     Row Name 04/05/21 1139             Time Calculation- SLP    SLP Start Time  1045  (Pended)   -LB      SLP Received On  04/05/21  (Pended)   -LB        User Key  (r) = Recorded By, (t) = Taken By, (c) = Cosigned By    Initials Name Provider Type    LB [facility], Speech Therapy Student Speech Therapy Student          Therapy Charges for Today     Code Description Service Date Service Provider Modifiers Qty    54737465354 HC ST TREATMENT SPEECH 1 4/5/2021 [facility] Speech Therapy Student GN 1    23819288049 HC ST TREATMENT SWALLOW 2 4/5/2021 [facility] Speech Therapy Student GN 1               [facility] Speech Therapy Student  4/5/2021     Patient was not wearing a face mask and did not exhibit coughing during this therapy encounter.  Procedure performed was not aerosolizing, involved close contact (within 6 feet for at least 15 minutes or longer), and did not involve contact with infectious secretions or specimens.  Therapist used appropriate personal protective equipment including gloves, standard procedure mask and eye protection.  Appropriate PPE was worn during the entire therapy session.  Hand hygiene was completed before and after therapy session.  Francisca Resendez was also present during this encounter and the aforementioned applies to them, as well.

## 2021-04-05 NOTE — PLAN OF CARE
Goal Outcome Evaluation:  Plan of Care Reviewed With: (P) patient  Progress: (P) improving   SLP treatment completed. Will continue to address dysphagia and language goals. Please see note for further details and recommendations.

## 2021-04-05 NOTE — PROGRESS NOTES
[facility] Medicine Services  PROGRESS NOTE    Patient Name: Sebastian Winter  : 1940  MRN: [id number]    Date of Admission: 3/26/2021  Primary Care Physician: Yoav Bee MD    Subjective   Subjective     CC:  Follow up CVA    HPI:  Thinks he is doing okay.  Sat up in chair part of yesterday.  Has no complaints.      ROS:  Appetite okay.   No fevers  No chest pain or palpitations  No dyspnea  No n/v or diarrhea.     Objective   Objective     Vital Signs:   Temp:  [97.6 °F (36.4 °C)-98 °F (36.7 °C)] 97.7 °F (36.5 °C)  Heart Rate:  [50-76] 50  Resp:  [16-18] 16  BP: (133-152)/(66-88) 142/77  Total (NIH Stroke Scale): 4     Physical Exam:  Constitutional: No acute distress, awake and conversant.  Speech fairly clear today   HENT: NCAT, mucous membranes moist  Respiratory: Clear to auscultation bilaterally, respiratory effort normal on RA  Cardiovascular: RRR, no murmurs, rubs, or gallops  Gastrointestinal: Positive bowel sounds, soft, nontender, nondistended  Musculoskeletal: No bilateral ankle edema  Psychiatric: nl mood and affect, calm and coop   Neurologic: R facial droop not noted today ; SALINAS symmetrically   Skin: warm, dry, no visible rash    Results Reviewed:  Results from last 7 days   Lab Units 21  0516   WBC 10*3/mm3 7.13   HEMOGLOBIN g/dL 13.3   HEMATOCRIT % 42.3   PLATELETS 10*3/mm3 182     Results from last 7 days   Lab Units 21  0856   SODIUM mmol/L 143   POTASSIUM mmol/L 3.7   CHLORIDE mmol/L 106   CO2 mmol/L 31.0*   BUN mg/dL 11   CREATININE mg/dL 1.05   GLUCOSE mg/dL 97   CALCIUM mg/dL 8.6     Estimated Creatinine Clearance: 47.6 mL/min (by C-G formula based on SCr of 1.05 mg/dL).    Microbiology Results Abnormal     Procedure Component Value - Date/Time    COVID PRE-OP / PRE-PROCEDURE SCREENING ORDER (NO ISOLATION) - Swab, Nasopharynx [[id number]]  (Normal) Collected: 21    Lab Status: Final result Specimen: Swab from Nasopharynx  Updated: 03/26/21 1852    Narrative:      The following orders were created for panel order COVID PRE-OP / PRE-PROCEDURE SCREENING ORDER (NO ISOLATION) - Swab, Nasopharynx.  Procedure                               Abnormality         Status                     ---------                               -----------         ------                     COVID-19 and FLU A/B PCR...[[id number]]  Normal              Final result                 Please view results for these tests on the individual orders.    COVID-19 and FLU A/B PCR - Swab, Nasopharynx [[id number]]  (Normal) Collected: 03/26/21 1816    Lab Status: Final result Specimen: Swab from Nasopharynx Updated: 03/26/21 1852     COVID19 Not Detected     Influenza A PCR Not Detected     Influenza B PCR Not Detected    Narrative:      Fact sheet for providers: https://www.fda.gov/media/042751/download    Fact sheet for patients: https://www.fda.gov/media/654901/download    Test performed by PCR.          Imaging Results (Last 24 Hours)     ** No results found for the last 24 hours. **          Results for orders placed during the hospital encounter of 03/26/21    Adult Transthoracic Echo Complete W/ Cont if Necessary Per Protocol (With Agitated Saline)    Interpretation Summary  · Estimated left ventricular EF = 35% Left ventricular systolic function is moderately decreased. Inferoposterior hypokinesis  · Trace to mild mitral valve regurgitation is present.  · Trace tricuspid valve regurgitation is present.  · Saline test results are negative for intracardiac shunting.  · No obvious cardiac source for embolus is seen.      I have reviewed the medications:  Scheduled Meds:aspirin, 325 mg, Oral, Daily   Or  aspirin, 300 mg, Rectal, Daily  atorvastatin, 80 mg, Oral, Nightly  carvedilol, 3.125 mg, Oral, BID With Meals  clonazePAM, 1 mg, Oral, Nightly  clopidogrel, 75 mg, Oral, Daily  donepezil, 5 mg, Oral, Nightly  fludrocortisone, 100 mcg, Oral, Daily  levothyroxine, 75 mcg,  Oral, Q AM  pantoprazole, 40 mg, Oral, Q AM  sertraline, 50 mg, Oral, Daily  sodium chloride, 10 mL, Intravenous, Q12H      Continuous Infusions:   PRN Meds:.•  acetaminophen  •  acetaminophen  •  clonazePAM  •  magnesium sulfate **OR** magnesium sulfate **OR** magnesium sulfate  •  potassium chloride **OR** potassium chloride **OR** potassium chloride  •  sodium chloride  •  sodium chloride    Assessment/Plan   Assessment & Plan     Active Hospital Problems    Diagnosis  POA   • **Dysarthria [R47.1]  Yes   • Cardiomyopathy (CMS/HCC) [I42.9]  Yes   • Essential hypertension [I10]  Yes   • Dyslipidemia [E78.5]  Yes   • Acute left-sided weakness [R53.1]  Yes   • Dementia without behavioral disturbance (CMS/HCC) [F03.90]  Yes   • Hypothyroidism (acquired) [E03.9]  Yes   • GERD without esophagitis [K21.9]  Yes      Resolved Hospital Problems   No resolved problems to display.        Brief Hospital Course to date:  Sebastian Winter is a 81 y.o. male with recent CVA treated at [facility] with no residual deficit per family, subsequently transitioned to long term care at [facility].   Sent to [facility] ED with acute L side weakness and dysarthria similar to his recent stroke as well as right sided facial droop.      This patient's problems and plans were partially entered by my partner and updated as appropriate by me 04/05/21.    Dysarthria and left sided weakness, acute  HX recent CVA  - imaging (CT head, CT perfusion, CTA head/neck, MRI brain) shows old lacunar infarct in the bud but no acute abnormality per radiology interpretation.  - unclear if this is exac of prev stroke vs new stroke; r/o cardioembolic.  Stroke team has signed off.    - P2Y12 103:  approp inhibition noted  - echo - EF 35, bubble study neg   -Continue ASA, plavix, atorvastatin  -cardiac event monitor at DC: Alex - discussed w dr loza      Diastolic Heart Failure  -EF 35% , grade I,   -cardiology consulted and started carvedilol , signed  off  -had LHC at [facility], with some CAD but no LETICIA      Dementia  -Son-in-law reports he normally does well when he gets his nightly clonazepam  -continue clonazepam 1 mg at HS  -continue Aricept     Hypothyroidism  -Continue synthroid     GERD  -Continue PPI     Vitamin B12 deficiency  Vitamin D-deficiency  -On replacement at SNF         DVT Prophylaxis: [place]         Disposition: I expect the patient to be discharged TBD, will need IRF     CODE STATUS:   Code Status and Medical Interventions:   Ordered at: 03/26/21 1741     Limited Support to NOT Include:    Antiarrhythmic Drugs    Cardioversion/Defibrillation    Intubation     Code Status:    No CPR     Medical Interventions (Level of Support Prior to Arrest):    Limited       Lori Barrett MD  04/05/21

## 2021-04-06 PROCEDURE — 99232 SBSQ HOSP IP/OBS MODERATE 35: CPT | Performed by: INTERNAL MEDICINE

## 2021-04-06 PROCEDURE — 92507 TX SP LANG VOICE COMM INDIV: CPT | Performed by: SPEECH-LANGUAGE PATHOLOGIST

## 2021-04-06 PROCEDURE — 25010000002 ENOXAPARIN PER 10 MG: Performed by: INTERNAL MEDICINE

## 2021-04-06 PROCEDURE — 92526 ORAL FUNCTION THERAPY: CPT | Performed by: SPEECH-LANGUAGE PATHOLOGIST

## 2021-04-06 RX ADMIN — CARVEDILOL 3.12 MG: 3.12 TABLET, FILM COATED ORAL at 09:02

## 2021-04-06 RX ADMIN — CLOPIDOGREL BISULFATE 75 MG: 75 TABLET ORAL at 09:02

## 2021-04-06 RX ADMIN — ATORVASTATIN CALCIUM 80 MG: 40 TABLET, FILM COATED ORAL at 22:32

## 2021-04-06 RX ADMIN — DONEPEZIL HYDROCHLORIDE 5 MG: 10 TABLET, FILM COATED ORAL at 22:32

## 2021-04-06 RX ADMIN — FLUDROCORTISONE ACETATE 100 MCG: 0.1 TABLET ORAL at 09:04

## 2021-04-06 RX ADMIN — CLONAZEPAM 0.5 MG: 0.5 TABLET ORAL at 15:33

## 2021-04-06 RX ADMIN — ASPIRIN 325 MG ORAL TABLET 325 MG: 325 PILL ORAL at 09:02

## 2021-04-06 RX ADMIN — CLONAZEPAM 1 MG: 1 TABLET ORAL at 22:33

## 2021-04-06 RX ADMIN — LEVOTHYROXINE SODIUM 75 MCG: 75 TABLET ORAL at 06:06

## 2021-04-06 RX ADMIN — PANTOPRAZOLE SODIUM 40 MG: 40 TABLET, DELAYED RELEASE ORAL at 06:06

## 2021-04-06 RX ADMIN — ENOXAPARIN SODIUM 40 MG: 40 INJECTION SUBCUTANEOUS at 09:02

## 2021-04-06 RX ADMIN — SERTRALINE HYDROCHLORIDE 50 MG: 50 TABLET ORAL at 09:02

## 2021-04-06 NOTE — PROGRESS NOTES
Continued Stay Note  [facility]     Patient Name: Sebastian Winter  MRN: [id number]  Today's Date: 4/6/2021    Admit Date: 3/26/2021    Discharge Plan     Row Name 04/06/21 1315       Plan    Plan  [facility] or [facility]    Patient/Family in Agreement with Plan  yes    Plan Comments  Spoke with Monica at [facility] and they can offer a bed. Spoke with Jozef (daughter) and she wants a referral sent to [facility] before accepting the bed at [facility], Spoke with Avelina at [facility] and faxed referral. CM will continue to follow.    Final Discharge Disposition Code  03 - skilled nursing facility (SNF)        Discharge Codes    No documentation.             Hipolito Thompson RN

## 2021-04-06 NOTE — DISCHARGE PLACEMENT REQUEST
"Sebastian Winter (81 y.o. Male)   Jorge Thompson, RN Case Manager  [phone]  Short term rehab to long term private pay    Date of Birth Social Security Number Address Home Phone MRN    1940  [address] [place] [address] [phone] [id number]    Rastafarian Marital Status          Unknown        Admission Date Admission Type Admitting Provider Attending Provider Department, Room/Bed    3/26/21 Emergency Kirt Lowe MD Opii, [place], MD [facility] 3F, S319/1    Discharge Date Discharge Disposition Discharge Destination                       Attending Provider: Kirt Lowe MD    Allergies: No Known Allergies    Isolation: None   Infection: None   Code Status: No CPR    Ht: 175.3 cm (69\")   Wt: 62.1 kg (137 lb)    Admission Cmt: None   Principal Problem: Dysarthria [R47.1]                 Active Insurance as of 3/26/2021     Primary Coverage     Payor Plan Insurance Group Employer/Plan Group    MEDICARE MEDICARE A & B      Payor Plan Address Payor Plan Phone Number Payor Plan Fax Number Effective Dates    [address] [phone]  2004 - None Entered    [place] [address]       Subscriber Name Subscriber Birth Date Member ID       SEBASTIAN WINTER 1940 [id number]                 Emergency Contacts      (Rel.) Home Phone Work Phone Mobile Phone    Ben Nelsonhortencia (Friend) [phone] -- --    Nola Poole (Daughter) [phone] -- --    Jozef Lock (Daughter) [phone] -- --               History & Physical      Vilma James MD at 21 1811              [facility] Medicine Services  HISTORY AND PHYSICAL    Patient Name: Sebastian Winter  : 1940  MRN: [id number]  Primary Care Physician: Yoav Bee MD  Date of admission: 3/26/2021      Subjective   Subjective     Chief Complaint:  Left sided weakness and dysarthria    HPI:  Sebastian Winter is a 81 y.o. male with recent CVA treated at [facility] [facility] with no residual deficit per family, subsequently transitioned to long term care at [facility] who presented today due to acute onset left sided weakness and dysarthria similar to his recent stroke as well as right sided facial droop.  He has been on aspirin, plavix and statin medication since his recent hospitalization.  Family reports he initially had difficulty swallowing during last hospitalization but symptoms all resolved and he was able to eat without restriction or modification at the time of discharge.  He has had improvement in his left sided weakness but residual dysarthria since coming to the ED.    COVID Details: [x] No Symptoms  Symptoms: [] Fever []  Cough [] Shortness of breath [] Change in taste or smell  Risks:  [] Direct Exposure [] High risk facility   Date of Onset:  ____  Date of first positive COVID test:     Review of Systems   Unable to obtain complete ROS due to dementia and significant dysarthria    All other systems reviewed and are negative.     Personal History     Past Medical History:   Diagnosis Date   • Dementia (CMS/HCC)    • Depression    • GERD (gastroesophageal reflux disease)    • Hypothyroid        Past Surgical History:   Procedure Laterality Date   • JOINT REPLACEMENT         Family History: family history is not on file. Otherwise pertinent FHx was reviewed and unremarkable.     Social History:  reports that he has never smoked. He has quit using smokeless tobacco.  His smokeless tobacco use included chew.  Social History     Social History Narrative   • Not on file       Medications:  Available home medication information reviewed.  Medications Prior to Admission   Medication Sig Dispense Refill Last Dose   • acetaminophen (TYLENOL) 325 MG tablet Take 650 mg by mouth Every 6 (Six) Hours As Needed for Mild Pain .      • aspirin (aspirin) 81 MG EC tablet Take 81 mg by mouth Daily.      • atorvastatin (LIPITOR) 80 MG tablet Take 80 mg by mouth Daily.      •  bisacodyl (DULCOLAX) 5 MG EC tablet Take 10 mg by mouth Daily As Needed for Constipation.      • cholecalciferol (VITAMIN D3) 25 MCG (1000 UT) tablet Take 1,000 Units by mouth Daily.      • clonazePAM (KlonoPIN) 0.5 MG tablet Take 0.5 mg by mouth 2 (Two) Times a Day As Needed.      • clonazePAM (KlonoPIN) 1 MG tablet Take 1 mg by mouth 2 (Two) Times a Day As Needed.      • clopidogrel (PLAVIX) 75 MG tablet Take 75 mg by mouth Daily.      • fludrocortisone 0.1 MG tablet Take  by mouth Daily.      • levothyroxine (SYNTHROID, LEVOTHROID) 75 MCG tablet Take 75 mcg by mouth Daily.      • magnesium hydroxide (MILK OF MAGNESIA) 400 MG/5ML suspension Take  by mouth Daily As Needed for Constipation.      • pantoprazole (PROTONIX) 40 MG EC tablet Take 40 mg by mouth Daily.      • sertraline (ZOLOFT) 50 MG tablet Take 50 mg by mouth Daily.      • tuberculin (Aplisol) 5 UNIT/0.1ML injection Inject  into the appropriate area of the skin as directed by provider 1 (One) Time.      • vitamin B-12 (CYANOCOBALAMIN) 1000 MCG tablet Take 1,000 mcg by mouth Daily.          No Known Allergies    Objective   Objective     Vital Signs:   Temp:  [98 °F (36.7 °C)-98.5 °F (36.9 °C)] 98.5 °F (36.9 °C)  Heart Rate:  [61-91] 64  Resp:  [13-18] 18  BP: (137-159)/(71-87) 159/87  Total (NIH Stroke Scale): 3    Physical Exam   Constitutional: Awake, alert  Eyes: PERRLA, sclerae anicteric, no conjunctival injection  HENT: NCAT, mucous membranes moist  Neck: Supple, trachea midline  Respiratory: Clear to auscultation bilaterally, nonlabored respirations   Cardiovascular: RRR, no murmurs, rubs, or gallops  Gastrointestinal: Positive bowel sounds, soft, nontender, nondistended  Musculoskeletal: No bilateral ankle edema, no clubbing or cyanosis to extremities  Psychiatric: Appropriate affect, cooperative  Neurologic: Knows he is in the hospital and that Vargas is president, does not know the year, neurologic exam difficult due to patient  hearing/understanding instructions but strength is fairly symmetric in all extremities, minimal right facial droop, speech dysarthric  Skin: No rashes on exposed surfaces    Result Review:  I have personally reviewed the results from the time of this admission to 03/26/21 6:11 PM EDT and agree with these findings:  [x]  Laboratory  []  Microbiology  [x]  Radiology  []  EKG/Telemetry   []  Cardiology/Vascular   []  Pathology  []  Old records  []  Other:  Most notable findings include:       LAB RESULTS:      Lab 03/26/21  1459 03/26/21  1455   WBC 8.11  --    HEMOGLOBIN 14.4  --    HEMOGLOBIN, POC  --  15.6   HEMATOCRIT 45.2  --    HEMATOCRIT POC  --  46   PLATELETS 219  --    NEUTROS ABS 5.45  --    IMMATURE GRANS (ABS) 0.02  --    LYMPHS ABS 1.83  --    MONOS ABS 0.68  --    EOS ABS 0.09  --    MCV 90.8  --    APTT 31.1  --          Lab 03/26/21  1455   CREATININE 1.30         Lab 03/26/21  1459   ALT (SGPT) 12   AST (SGOT) 20         Lab 03/26/21  1459   TROPONIN T <0.010                 Lab 03/26/21  1455   PH, ARTERIAL 7.40     Brief Urine Lab Results     None        Microbiology Results (last 10 days)     Procedure Component Value - Date/Time    COVID PRE-OP / PRE-PROCEDURE SCREENING ORDER (NO ISOLATION) - Swab, Nasopharynx [[id number]]  (Normal) Collected: 03/26/21 1816    Lab Status: Final result Specimen: Swab from Nasopharynx Updated: 03/26/21 1852    Narrative:      The following orders were created for panel order COVID PRE-OP / PRE-PROCEDURE SCREENING ORDER (NO ISOLATION) - Swab, Nasopharynx.  Procedure                               Abnormality         Status                     ---------                               -----------         ------                     COVID-19 and FLU A/B PCR...[[id number]]  Normal              Final result                 Please view results for these tests on the individual orders.    COVID-19 and FLU A/B PCR - Swab, Nasopharynx [[id number]]  (Normal) Collected: 03/26/21 1816     Lab Status: Final result Specimen: Swab from Nasopharynx Updated: 03/26/21 1852     COVID19 Not Detected     Influenza A PCR Not Detected     Influenza B PCR Not Detected    Narrative:      Fact sheet for providers: https://www.fda.gov/media/151473/download    Fact sheet for patients: https://www.fda.gov/media/976715/download    Test performed by PCR.          CT Angiogram Neck    Result Date: 3/26/2021  EXAMINATION: CT ANGIOGRAM HEAD-, CT ANGIOGRAM NECK-  INDICATION: right side facial droop, slurred speech  TECHNIQUE: Axial IV arterial phase contrast-enhanced CT angiogram of the head and neck. Three-dimensional reconstructions were postprocessed.  The radiation dose reduction device was turned on for each scan per the ALARA (As Low as Reasonably Achievable) protocol.  COMPARISON: NONE  FINDINGS: The lung apices are grossly clear. Evaluation of the neck soft tissues demonstrates no pathologic adenopathy or unexpected soft tissue neck mass. Osseous structures demonstrate no evidence of acute fracture or aggressive osseous lesion. Patent atherosclerotic aortic arch and great vessel origins. On the right, minimal atherosclerotic change, with no significant resultant ICA narrowing, 0% by NASCET criteria. Similar 0% narrowing is present on the left. The vertebral arteries are normal in course and caliber bilaterally. Intracranially, there is minimal atherosclerotic change of the carotid siphons without significant resultant luminal narrowing. The branching [place] vessels are grossly patent high-grade flow limiting stenosis, large vessel occlusion or aneurysmal dilatation. The vertebrobasilar system is unremarkable.      Impression: Essentially normal CT angiogram of the head and neck with mild atherosclerotic change, not producing significant resultant luminal narrowing. No large vessel occlusion or aneurysmal dilatation.   This report was finalized on 3/26/2021 3:33 PM by Godwin Allen.      MRI Brain  Without Contrast    Result Date: 3/26/2021  EXAMINATION: MRI BRAIN WO CONTRAST - 03/26/2021  INDICATION: R53.1-Weakness; R47.9-Unspecified speech disturbances. Right facial droop. Evaluate for stroke.  TECHNIQUE: Routine multiplanar imaging is obtained of the brain without the administration of  gadolinium contrast.  COMPARISON: None.  FINDINGS: There is extensive atrophy identified of the brain. Chronic small vessel ischemic changes seen throughout the periventricular and subcortical white matter. There is no evidence of restricted diffusion to suggest evidence of an acute ischemic insult. There is as tiny area of increased T2 shine through at the right vertex from prior insult. Flow voids are preserved in the major intracranial vessels. The globes and orbits are intact. Visualized paranasal sinuses are clear. Mastoid air cells are patent. Pituitary and sella are unremarkable. Old lacunar infarct identified in the bud. There is no hemorrhage or hydrocephalus. No mass, mass effect, or midline shift. No abnormal extra-axial fluid collections identified.      Impression: Extensive atrophy of the brain and chronic small vessel ischemic changes seen throughout the periventricular and subcortical white matter with old lacunar insult seen in the bud. No acute intracranial abnormality identified.  DICTATED:   03/26/2021 EDITED/ls :   03/26/2021        XR Chest 1 View    Result Date: 3/26/2021  EXAMINATION: XR CHEST 1 VW-  INDICATION: Acute Stroke Protocol (onset < 12 hrs); R53.1-Weakness; R47.9-Unspecified speech disturbances  COMPARISON: 3/21/2012  FINDINGS: There is mild hypoventilatory change with some scattered areas of subsegmental atelectasis. There is otherwise no evidence of focal airspace consolidation. No significant effusion or distinct pneumothorax. Unremarkable heart and mediastinal contours.      Impression: Mild hypoventilatory changes without evidence of acute cardiopulmonary abnormality otherwise.  This  report was finalized on 3/26/2021 4:38 PM by Godwin Allen.      CT Angiogram Head    Result Date: 3/26/2021  EXAMINATION: CT ANGIOGRAM HEAD-, CT ANGIOGRAM NECK-  INDICATION: right side facial droop, slurred speech  TECHNIQUE: Axial IV arterial phase contrast-enhanced CT angiogram of the head and neck. Three-dimensional reconstructions were postprocessed.  The radiation dose reduction device was turned on for each scan per the ALARA (As Low as Reasonably Achievable) protocol.  COMPARISON: NONE  FINDINGS: The lung apices are grossly clear. Evaluation of the neck soft tissues demonstrates no pathologic adenopathy or unexpected soft tissue neck mass. Osseous structures demonstrate no evidence of acute fracture or aggressive osseous lesion. Patent atherosclerotic aortic arch and great vessel origins. On the right, minimal atherosclerotic change, with no significant resultant ICA narrowing, 0% by NASCET criteria. Similar 0% narrowing is present on the left. The vertebral arteries are normal in course and caliber bilaterally. Intracranially, there is minimal atherosclerotic change of the carotid siphons without significant resultant luminal narrowing. The branching [place] vessels are grossly patent high-grade flow limiting stenosis, large vessel occlusion or aneurysmal dilatation. The vertebrobasilar system is unremarkable.      Impression: Essentially normal CT angiogram of the head and neck with mild atherosclerotic change, not producing significant resultant luminal narrowing. No large vessel occlusion or aneurysmal dilatation.   This report was finalized on 3/26/2021 3:33 PM by Godwin Allen.      CT Head Without Contrast Stroke Protocol    Result Date: 3/26/2021  EXAMINATION: CT HEAD WO CONTRAST, STROKE PROTOCOL-03/26/2021:  INDICATION: Stroke, follow-up, slurred speech, generalized weakness.  TECHNIQUE: Multiple axial CT imaging was obtained of the head from the skull base to the skull vertex without  the administration of intravenous contrast.  The radiation dose reduction device was turned on for each scan per the ALARA (As Low as Reasonably Achievable) protocol.  COMPARISON: NONE.  FINDINGS: There is atrophy of the brain. Minimal low-density areas seen in the periventricular and subcortical white matter to suggest chronic small vessel ischemic change. No hemorrhage or hydrocephalus. No mass, mass effect, or midline shift. No abnormal extra-axial fluid collection is identified.      Impression: Atrophy and chronic changes seen within the brain with no acute intracranial abnormality.  The examination was performed 03/26/2021 at 2:42 PM and the examination results were given to the emergency room physician on 03/26/2021 at the time of performance of the examination at the scanner.  D:  03/26/2021 E:  03/26/2021  This report was finalized on 3/26/2021 3:57 PM by Dr. Yesy Orozco MD.      CT Cerebral Perfusion With & Without Contrast    Result Date: 3/26/2021  EXAMINATION: CT CEREBRAL PERFUSION W WO CONTRAST-  INDICATION: facial droop, slurred speech  TECHNIQUE: Cerebral perfusion analysis was performed using computed tomography with contrast administration, including post processing of parametric maps with determination of cerebral blood flow, cerebral blood volume, and mean transit time.  The radiation dose reduction device was turned on for each scan per the ALARA (As Low as Reasonably Achievable) protocol.  COMPARISON: Noncontrast CT head performed concurrently  FINDINGS: Perfusion maps demonstrate relative symmetry involving cerebral blood flow and cerebral blood volume, without definite evidence of core infarct or large area of ischemic tissue at risk.      Impression: Relatively symmetric perfusion with no evidence of core infarct or ischemic tissue at risk  This report was finalized on 3/26/2021 3:29 PM by Godwin Allen.            Assessment/Plan   Assessment & Plan     Active Hospital Problems     Diagnosis  POA   • **Dysarthria [R47.1]  Yes   • Acute left-sided weakness [R53.1]  Yes   • Dementia without behavioral disturbance (CMS/HCC) [F03.90]  Yes   • Hypothyroidism (acquired) [E03.9]  Yes   • GERD without esophagitis [K21.9]  Yes     Dysarthria and left sided weakness  -CT head, CT perfusion, CTA head/neck with no acute abnormality or significant vascular stenosis/occlusion.  MRI brain shows old lacunar infarct in the bud but no acute abnormality per radiology interpretation.  -Stroke team following, neurology consultation in am  -Continue ASA, plavix, atorvastatin (once able to take PO; give rectal ASA today)  -Pending outside records including echocardiogram  -PT/OT/SLP consults  -NPO until evaluated by SLP  -Obtain UA-pending collection    Dementia  -Son-in-law reports he normally does well when he gets his nightly clonazepam  -Due to NPO status, will give IV ativan tonight    Hypothyroidism  -Check TSH  -Continue synthroid    GERD  -Continue PPI    Vitamin B12 deficiency  Vitamin D-deficiency  -On replacement at SNF     DVT prophylaxis:  Mechanical      CODE STATUS:    Code Status and Medical Interventions:   Ordered at: 03/26/21 1741     Limited Support to NOT Include:    Antiarrhythmic Drugs    Cardioversion/Defibrillation    Intubation     Code Status:    No CPR     Medical Interventions (Level of Support Prior to Arrest):    Limited       Admission Status:  I believe this patient meets OBSERVATION status, however if further evaluation or treatment plans warrant, status may change.  Based upon current information, I predict patient's care encounter to be less than or equal to 2 midnights.    Vilma Jamse MD  03/26/21    Electronically signed by Vilma James MD at 03/26/21 5760       Vital Signs (last day)     Date/Time   Temp   Temp src   Pulse   Resp   BP   Patient Position   SpO2    04/06/21 0737   97.7 (36.5)   Oral   65   16   143/81   Lying   --    04/06/21 0325   97.7 (36.5)   Oral   60    16   152/75   Lying   --    04/05/21 2312   97.9 (36.6)   Oral   59   16   160/77   Lying   95    04/05/21 1855   97.7 (36.5)   Oral   56   16   160/81   Lying   94    04/05/21 1613   97.8 (36.6)   Oral   61   16   154/90   Sitting   --    04/05/21 1139   97.8 (36.6)   Oral   54   16   133/65   Lying   --    04/05/21 0950   --   --   61   --   152/75   --   --    04/05/21 0812   97.7 (36.5)   Axillary   50   16   142/77   Lying   94    04/05/21 0350   97.8 (36.6)   Oral   75   18   152/83   Lying   93                Current Facility-Administered Medications   Medication Dose Route Frequency Provider Last Rate Last Admin   • acetaminophen (TYLENOL) suppository 650 mg  650 mg Rectal Q4H PRN Gwen Riley PA       • acetaminophen (TYLENOL) tablet 650 mg  650 mg Oral Q6H PRN Gwen Riley PA   650 mg at 04/05/21 0950   • aspirin tablet 325 mg  325 mg Oral Daily Darlene Mccallum APRN   325 mg at 04/05/21 0950    Or   • aspirin suppository 300 mg  300 mg Rectal Daily Darlene Mccallum APRN       • atorvastatin (LIPITOR) tablet 80 mg  80 mg Oral Nightly Darlene Mccallum APRN   80 mg at 04/05/21 2321   • carvedilol (COREG) tablet 3.125 mg  3.125 mg Oral BID With Meals Eber Dodge APRN   3.125 mg at 04/05/21 1706   • clonazePAM (KlonoPIN) tablet 0.5 mg  0.5 mg Oral Daily PRN Lori Barrett MD   0.5 mg at 03/31/21 2116   • clonazePAM (KlonoPIN) tablet 1 mg  1 mg Oral Nightly Lori Barrett MD   1 mg at 04/05/21 2321   • clopidogrel (PLAVIX) tablet 75 mg  75 mg Oral Daily Darlene Mccallum APRN   75 mg at 04/05/21 0951   • donepezil (ARICEPT) tablet 5 mg  5 mg Oral Nightly Kevin Lucia MD   5 mg at 04/05/21 2322   • enoxaparin (LOVENOX) syringe 40 mg  40 mg Subcutaneous Daily Lori Barrett MD   40 mg at 04/05/21 0949   • fludrocortisone tablet 100 mcg  100 mcg Oral Daily Vilma James MD   100 mcg at 04/05/21 0951   • levothyroxine (SYNTHROID, LEVOTHROID) tablet 75 mcg  75 mcg Oral Q AM Vilma James,  MD   75 mcg at 21   • Magnesium Sulfate 2 gram Bolus, followed by 8 gram infusion (total Mg dose 10 grams)- Mg less than or equal to 1mg/dL  2 g Intravenous PRN Hafsa English DO        Or   • Magnesium Sulfate 2 gram / 50mL Infusion (GIVE X 3 BAGS TO EQUAL 6GM TOTAL DOSE) - Mg 1.1 - 1.5 mg/dl  2 g Intravenous PRN Hafsa English DO        Or   • Magnesium Sulfate 4 gram infusion- Mg 1.6-1.9 mg/dL  4 g Intravenous PRN Hafsa English DO       • pantoprazole (PROTONIX) EC tablet 40 mg  40 mg Oral Q AM Vilma James MD   40 mg at 21   • potassium chloride (MICRO-K) CR capsule 40 mEq  40 mEq Oral PRN Hafsa English DO   40 mEq at 21    Or   • potassium chloride (KLOR-CON) packet 40 mEq  40 mEq Oral PRN Hafsa English DO        Or   • potassium chloride 10 mEq in 100 mL IVPB  10 mEq Intravenous Q1H PRN Hafsa English DO       • sertraline (ZOLOFT) tablet 50 mg  50 mg Oral Daily Vilma James MD   50 mg at 21   • sodium chloride 0.9 % flush 10 mL  10 mL Intravenous PRN Yong Gonzalez MD       • sodium chloride 0.9 % flush 10 mL  10 mL Intravenous Q12H Darlene Mccallum APRN   10 mL at 21   • sodium chloride 0.9 % flush 10 mL  10 mL Intravenous PRN Darlene Mccallum APRN            Physician Progress Notes (last 24 hours) (Notes from 21 through 21)      Lori Barrett MD at 21              [facility] Medicine Services  PROGRESS NOTE    Patient Name: Sebastian Winter  : 1940  MRN: [id number]    Date of Admission: 3/26/2021  Primary Care Physician: Yoav Bee MD    Subjective   Subjective     CC:  Follow up CVA    HPI:  Thinks he is doing okay.  Sat up in chair part of yesterday.  Has no complaints.      ROS:  Appetite okay.   No fevers  No chest pain or palpitations  No dyspnea  No n/v or diarrhea.     Objective    Objective     Vital Signs:   Temp:  [97.6 °F (36.4 °C)-98 °F (36.7 °C)] 97.7 °F (36.5 °C)  Heart Rate:  [50-76] 50  Resp:  [16-18] 16  BP: (133-152)/(66-88) 142/77  Total (NIH Stroke Scale): 4     Physical Exam:  Constitutional: No acute distress, awake and conversant.  Speech fairly clear today   HENT: NCAT, mucous membranes moist  Respiratory: Clear to auscultation bilaterally, respiratory effort normal on RA  Cardiovascular: RRR, no murmurs, rubs, or gallops  Gastrointestinal: Positive bowel sounds, soft, nontender, nondistended  Musculoskeletal: No bilateral ankle edema  Psychiatric: nl mood and affect, calm and coop   Neurologic: R facial droop not noted today ; SALINAS symmetrically   Skin: warm, dry, no visible rash    Results Reviewed:  Results from last 7 days   Lab Units 04/03/21  0516   WBC 10*3/mm3 7.13   HEMOGLOBIN g/dL 13.3   HEMATOCRIT % 42.3   PLATELETS 10*3/mm3 182     Results from last 7 days   Lab Units 04/03/21  0856   SODIUM mmol/L 143   POTASSIUM mmol/L 3.7   CHLORIDE mmol/L 106   CO2 mmol/L 31.0*   BUN mg/dL 11   CREATININE mg/dL 1.05   GLUCOSE mg/dL 97   CALCIUM mg/dL 8.6     Estimated Creatinine Clearance: 47.6 mL/min (by C-G formula based on SCr of 1.05 mg/dL).    Microbiology Results Abnormal     Procedure Component Value - Date/Time    COVID PRE-OP / PRE-PROCEDURE SCREENING ORDER (NO ISOLATION) - Swab, Nasopharynx [[id number]]  (Normal) Collected: 03/26/21 1816    Lab Status: Final result Specimen: Swab from Nasopharynx Updated: 03/26/21 1852    Narrative:      The following orders were created for panel order COVID PRE-OP / PRE-PROCEDURE SCREENING ORDER (NO ISOLATION) - Swab, Nasopharynx.  Procedure                               Abnormality         Status                     ---------                               -----------         ------                     COVID-19 and FLU A/B PCR...[[id number]]  Normal              Final result                 Please view results for these tests on the  individual orders.    COVID-19 and FLU A/B PCR - Swab, Nasopharynx [[id number]]  (Normal) Collected: 03/26/21 1816    Lab Status: Final result Specimen: Swab from Nasopharynx Updated: 03/26/21 1852     COVID19 Not Detected     Influenza A PCR Not Detected     Influenza B PCR Not Detected    Narrative:      Fact sheet for providers: https://www.fda.gov/media/222489/download    Fact sheet for patients: https://www.fda.gov/media/160927/download    Test performed by PCR.          Imaging Results (Last 24 Hours)     ** No results found for the last 24 hours. **          Results for orders placed during the hospital encounter of 03/26/21    Adult Transthoracic Echo Complete W/ Cont if Necessary Per Protocol (With Agitated Saline)    Interpretation Summary  · Estimated left ventricular EF = 35% Left ventricular systolic function is moderately decreased. Inferoposterior hypokinesis  · Trace to mild mitral valve regurgitation is present.  · Trace tricuspid valve regurgitation is present.  · Saline test results are negative for intracardiac shunting.  · No obvious cardiac source for embolus is seen.      I have reviewed the medications:  Scheduled Meds:aspirin, 325 mg, Oral, Daily   Or  aspirin, 300 mg, Rectal, Daily  atorvastatin, 80 mg, Oral, Nightly  carvedilol, 3.125 mg, Oral, BID With Meals  clonazePAM, 1 mg, Oral, Nightly  clopidogrel, 75 mg, Oral, Daily  donepezil, 5 mg, Oral, Nightly  fludrocortisone, 100 mcg, Oral, Daily  levothyroxine, 75 mcg, Oral, Q AM  pantoprazole, 40 mg, Oral, Q AM  sertraline, 50 mg, Oral, Daily  sodium chloride, 10 mL, Intravenous, Q12H      Continuous Infusions:   PRN Meds:.•  acetaminophen  •  acetaminophen  •  clonazePAM  •  magnesium sulfate **OR** magnesium sulfate **OR** magnesium sulfate  •  potassium chloride **OR** potassium chloride **OR** potassium chloride  •  sodium chloride  •  sodium chloride    Assessment/Plan   Assessment & Plan     Active Hospital Problems    Diagnosis  POA    • **Dysarthria [R47.1]  Yes   • Cardiomyopathy (CMS/HCC) [I42.9]  Yes   • Essential hypertension [I10]  Yes   • Dyslipidemia [E78.5]  Yes   • Acute left-sided weakness [R53.1]  Yes   • Dementia without behavioral disturbance (CMS/HCC) [F03.90]  Yes   • Hypothyroidism (acquired) [E03.9]  Yes   • GERD without esophagitis [K21.9]  Yes      Resolved Hospital Problems   No resolved problems to display.        Brief Hospital Course to date:  Sebastian Winter is a 81 y.o. male with recent CVA treated at [facility] with no residual deficit per family, subsequently transitioned to long term care at [facility].   Sent to [facility] ED with acute L side weakness and dysarthria similar to his recent stroke as well as right sided facial droop.      This patient's problems and plans were partially entered by my partner and updated as appropriate by me 04/05/21.    Dysarthria and left sided weakness, acute  HX recent CVA  - imaging (CT head, CT perfusion, CTA head/neck, MRI brain) shows old lacunar infarct in the bud but no acute abnormality per radiology interpretation.  - unclear if this is exac of prev stroke vs new stroke; r/o cardioembolic.  Stroke team has signed off.    - P2Y12 103:  approp inhibition noted  - echo - EF 35, bubble study neg   -Continue ASA, plavix, atorvastatin  -cardiac event monitor at DC: Alex - discussed w dr loza      Diastolic Heart Failure  -EF 35% , grade I,   -cardiology consulted and started carvedilol , signed off  -had LHC at [facility], with some CAD but no LETICIA      Dementia  -Son-in-law reports he normally does well when he gets his nightly clonazepam  -continue clonazepam 1 mg at HS  -continue Aricept     Hypothyroidism  -Continue synthroid     GERD  -Continue PPI     Vitamin B12 deficiency  Vitamin D-deficiency  -On replacement at SNF         DVT Prophylaxis: lovenoxl         Disposition: I expect the patient to be discharged TBD, will need IRF     CODE STATUS:   Code Status  and Medical Interventions:   Ordered at: 21 1741     Limited Support to NOT Include:    Antiarrhythmic Drugs    Cardioversion/Defibrillation    Intubation     Code Status:    No CPR     Medical Interventions (Level of Support Prior to Arrest):    Nichole Barrett MD  21              Electronically signed by Lori Barrett MD at 21 1155          Physical Therapy Notes (last 24 hours) (Notes from 21 0902 through 21 0902)      Yoana Gramajo PT at 21 1305  Version 1 of 1       Goal Outcome Evaluation:  Plan of Care Reviewed With: patient  Progress: no change  Outcome Summary: Pt required max assist x 2 for supine to sit. Pt able to stand with BUE assist with mod assist x 2 and perform bed to chair transfer with max assist x 2 and BUE support. Pt requires several cues to stay oriented to task with exercises. Will continue to progress patient as able. Continue to recommend SNF at discharge.    Electronically signed by Yoana Gramajo PT at 21 1412     Yoana Gramajo PT at 21 1305  Version 1 of 1         Patient Name: Sebastian Winter  : 1940    MRN: [id number]                              Today's Date: 2021       Admit Date: 3/26/2021    Visit Dx:     ICD-10-CM ICD-9-CM   1. Aphasia  R47.01 784.3   2. Acute left-sided weakness  R53.1 728.87   3. Oropharyngeal dysphagia  R13.12 787.22   4. Dysarthria  R47.1 784.51     Patient Active Problem List   Diagnosis   • Acute left-sided weakness   • Dysarthria   • Dementia without behavioral disturbance (CMS/HCC)   • Hypothyroidism (acquired)   • GERD without esophagitis   • Cardiomyopathy (CMS/HCC)   • Essential hypertension   • Dyslipidemia     Past Medical History:   Diagnosis Date   • Dementia (CMS/HCC)    • Depression    • GERD (gastroesophageal reflux disease)    • Hypothyroid      Past Surgical History:   Procedure Laterality Date   • JOINT REPLACEMENT       General Information     Row  Name 04/05/21 1305          Physical Therapy Time and Intention    Document Type  therapy note (daily note)  -CS     Mode of Treatment  physical therapy  -CS     Row Name 04/05/21 1305          General Information    Patient Profile Reviewed  yes  -CS     Existing Precautions/Restrictions  fall  -CS     Row Name 04/05/21 1305          Cognition    Orientation Status (Cognition)  oriented to;person;disoriented to;place;situation;time;verbal cues/prompts needed for orientation;other (see comments) Pt unable to state hospital but when asked if patient was in hospital he stated yes. Pt also able to answer yes when asked if it was April  -CS     Row Name 04/05/21 1305          Safety Issues, Functional Mobility    Safety Issues Affecting Function (Mobility)  safety precautions follow-through/compliance;insight into deficits/self-awareness;ability to follow commands;at risk behavior observed;awareness of need for assistance;judgment;problem-solving;safety precaution awareness;sequencing abilities  -CS     Impairments Affecting Function (Mobility)  endurance/activity tolerance;strength;range of motion (ROM);postural/trunk control;balance;coordination;motor control;cognition  -CS       User Key  (r) = Recorded By, (t) = Taken By, (c) = Cosigned By    Initials Name Provider Type    CS Yoana Gramajo, PT Physical Therapist        Mobility     Row Name 04/05/21 1305          Bed Mobility    Bed Mobility  rolling right;supine-sit;scooting/bridging  -CS     Rolling Right [place] (Bed Mobility)  verbal cues;maximum assist (25% patient effort)  -CS     Scooting/Bridging [place] (Bed Mobility)  verbal cues;maximum assist (25% patient effort)  -CS     Supine-Sit [place] (Bed Mobility)  verbal cues;maximum assist (25% patient effort);2 person assist  -CS     Assistive Device (Bed Mobility)  bed rails;draw sheet;head of bed elevated  -CS     Comment (Bed Mobility)  VC's to roll to side and drop feet off of bed and  push off of bed to raise trunk. Pt needed several cues on bed mobility. Once is sitting, patient had a strong lean to the R but with cues able to correct to neutral upright position and able to sit CGA.  -     Row Name 04/05/21 1305          Transfers    Comment (Transfers)  VC's on safe hand placement with transfers. Once in standing, patient continuously lifted R foot off of floor and required cues to keep foot on floor, patient able to follow. Pt required cues on sequencing of steps from bed to chair, but had difficulty following without assist to weightshift.  -CS     Row Name 04/05/21 1305          Bed-Chair Transfer    Bed-Chair [place] (Transfers)  verbal cues;maximum assist (25% patient effort);2 person assist  -CS     Assistive Device (Bed-Chair Transfers)  other (see comments) B UE assist  -     Row Name 04/05/21 1305          Sit-Stand Transfer    Sit-Stand [place] (Transfers)  verbal cues;moderate assist (50% patient effort);2 person assist  -CS     Assistive Device (Sit-Stand Transfers)  other (see comments) B UE assist  -     Row Name 04/05/21 1305          Gait/Stairs (Locomotion)    [place] Level (Gait)  unable to assess  -CS     Comment (Gait/Stairs)  Pt unable to follow cues to ambulate further then bed to chair.  -CS       User Key  (r) = Recorded By, (t) = Taken By, (c) = Cosigned By    Initials Name Provider Type    Yoana Gunn PT Physical Therapist        Obj/Interventions     Row Name 04/05/21 1305          Motor Skills    Therapeutic Exercise  hip;knee;ankle;other (see comments) Pt required several cues to stay oriented to exercise task  -     Row Name 04/05/21 1305          Hip (Therapeutic Exercise)    Hip Strengthening (Therapeutic Exercise)  bilateral;aBduction;aDduction;5 repetitions;10 repetitions  -CS     Row Name 04/05/21 1305          Knee (Therapeutic Exercise)    Knee Isometrics (Therapeutic Exercise)  bilateral;quad sets;10 repetitions;5  repetitions  -CS     Knee Strengthening (Therapeutic Exercise)  bilateral;heel slides;marching while seated;SLR (straight leg raise);LAQ (long arc quad);5 repetitions;10 repetitions  -     Row Name 04/05/21 1305          Ankle (Therapeutic Exercise)    Ankle AROM (Therapeutic Exercise)  bilateral;dorsiflexion;plantarflexion;10 repetitions;5 repetitions  -[facility] Name 04/05/21 1305          Balance    Balance Assessment  sitting static balance;standing static balance;standing dynamic balance  -     Static Sitting Balance  mild impairment;unsupported;sitting, edge of bed Max assist with strong R lean but able to correct to CGA with cues.  -CS     Static Standing Balance  moderate impairment;supported;standing  -CS     Dynamic Standing Balance  severe impairment;supported;standing  -       User Key  (r) = Recorded By, (t) = Taken By, (c) = Cosigned By    Initials Name Provider Type    Yoana Gunn PT Physical Therapist        Goals/Plan     Row Name 04/05/21 1775          Transfer Goal 1 (PT)    Time Frame (Transfer Goal 1, PT)  other (see comments) Goal met for STS but not for bed to chair transfer  -       User Key  (r) = Recorded By, (t) = Taken By, (c) = Cosigned By    Initials Name Provider Type    Yoana Gunn PT Physical Therapist        Clinical Impression     [facility] Name 04/05/21 1309          Pain    Additional Documentation  Pain Scale: Numbers Pre/Post-Treatment (Group)  -CS     Row Name 04/05/21 3738          Pain Scale: Numbers Pre/Post-Treatment    Pretreatment Pain Rating  0/10 - no pain  -CS     Posttreatment Pain Rating  0/10 - no pain  -CS     Pain Intervention(s)  Repositioned;Ambulation/increased activity  -     Row Name 04/05/21 1306          Plan of Care Review    Plan of Care Reviewed With  patient  -CS     Progress  no change  -     Outcome Summary  Pt required max assist x 2 for supine to sit. Pt able to stand with BUE assist with mod assist x 2 and perform  bed to chair transfer with max assist x 2 and BUE support. Pt requires several cues to stay oriented to task with exercises. Will continue to progress patient as able. Continue to recommend SNF at discharge.  -CS     Row Name 04/05/21 1305          Therapy Assessment/Plan (PT)    Rehab Potential (PT)  fair, will monitor progress closely  -CS     Criteria for Skilled Interventions Met (PT)  yes;meets criteria;skilled treatment is necessary  -CS     Row Name 04/05/21 1305          Vital Signs    Pre Systolic BP Rehab  140  -CS     Pre Treatment Diastolic BP  73  -CS     Post Systolic BP Rehab  163  -CS     Post Treatment Diastolic BP  95  -CS     Row Name 04/05/21 1305          Positioning and Restraints    Pre-Treatment Position  in bed  -CS     Post Treatment Position  chair  -CS     In Chair  notified nsg;reclined;call light within reach;encouraged to call for assist;exit alarm on;legs elevated;with OT  -CS       User Key  (r) = Recorded By, (t) = Taken By, (c) = Cosigned By    Initials Name Provider Type    CS Yoana Gramajo, PT Physical Therapist        Outcome Measures     Row Name 04/05/21 1305          How much help from another person do you currently need...    Turning from your back to your side while in flat bed without using bedrails?  2  -CS     Moving from lying on back to sitting on the side of a flat bed without bedrails?  2  -CS     Moving to and from a bed to a chair (including a wheelchair)?  2  -CS     Standing up from a chair using your arms (e.g., wheelchair, bedside chair)?  2  -CS     Climbing 3-5 steps with a railing?  1  -CS     To walk in hospital room?  2  -CS     AM-PAC 6 Clicks Score (PT)  11  -CS     Row Name 04/05/21 1307          Modified [place] Scale    Modified [place] Scale  4 - Moderately severe disability.  Unable to walk without assistance, and unable to attend to own bodily needs without assistance.  -CS     Row Name 04/05/21 1303          Functional Assessment    Outcome  Measure Options  AM-PAC 6 Clicks Basic Mobility (PT);Modified [place]  -CS       User Key  (r) = Recorded By, (t) = Taken By, (c) = Cosigned By    Initials Name Provider Type    Yoana Gunn PT Physical Therapist        Physical Therapy Education                 Title: PT OT SLP Therapies (In Progress)     Topic: Physical Therapy (Done)     Point: Mobility training (Done)     Learning Progress Summary           Patient Acceptance, E,D, VU,NR by CS at 4/5/2021 1305    Comment: Educated patient on bed mobility sequencing, safe hand placement with transfers, bed to chair transfer sequencing, ther ex, and plan for progression of care.    Acceptance, E,D, NL,NR by  at 4/2/2021 1530    Acceptance, E, NR by AS at 3/29/2021 1038    Acceptance, E, NR by SC at 3/28/2021 1010    Comment: REVIEWED BENEFITS OF ACTIVITY                   Point: Home exercise program (Done)     Learning Progress Summary           Patient Acceptance, E,D, VU,NR by CS at 4/5/2021 1305    Comment: Educated patient on bed mobility sequencing, safe hand placement with transfers, bed to chair transfer sequencing, ther ex, and plan for progression of care.    Acceptance, E,D, NL,NR by  at 4/2/2021 1530    Acceptance, E, NR by AS at 3/29/2021 1038    Acceptance, E, NR by SC at 3/28/2021 1010    Comment: REVIEWED BENEFITS OF ACTIVITY                   Point: Body mechanics (Done)     Learning Progress Summary           Patient Acceptance, E,D, VU,NR by CS at 4/5/2021 1305    Comment: Educated patient on bed mobility sequencing, safe hand placement with transfers, bed to chair transfer sequencing, ther ex, and plan for progression of care.    Acceptance, E,D, NL,NR by  at 4/2/2021 1530    Acceptance, E, NR by AS at 3/29/2021 1038    Acceptance, E, NR by SC at 3/28/2021 1010    Comment: REVIEWED BENEFITS OF ACTIVITY                   Point: Precautions (Done)     Learning Progress Summary           Patient Acceptance, E,D, VU,NR by CS at  4/5/2021 1305    Comment: Educated patient on bed mobility sequencing, safe hand placement with transfers, bed to chair transfer sequencing, ther ex, and plan for progression of care.    Acceptance, E,D, NL,NR by  at 4/2/2021 1530    Acceptance, E, NR by AS at 3/29/2021 1038    Acceptance, E, NR by SC at 3/28/2021 1010    Comment: REVIEWED BENEFITS OF ACTIVITY                               User Key     Initials Effective Dates Name Provider Type Discipline    SC 06/19/15 -  Bryce Bran, PT Physical Therapist PT    AS 06/22/15 -  Annette Ramirez, PTA Physical Therapy Assistant PT     06/08/18 -  Nava Wilburn, PT Physical Therapist PT     03/26/19 -  Yoana Gramajo, FATOU Physical Therapist PT              PT Recommendation and Plan  Planned Therapy Interventions (PT): balance training, bed mobility training, gait training, home exercise program, neuromuscular re-education, patient/family education, strengthening, transfer training  Plan of Care Reviewed With: patient  Progress: no change  Outcome Summary: Pt required max assist x 2 for supine to sit. Pt able to stand with BUE assist with mod assist x 2 and perform bed to chair transfer with max assist x 2 and BUE support. Pt requires several cues to stay oriented to task with exercises. Will continue to progress patient as able. Continue to recommend SNF at discharge.     Time Calculation:   PT Charges     Row Name 04/05/21 1305             Time Calculation    Start Time  1305  -      PT Received On  04/05/21  -         Time Calculation- PT    Total Timed Code Minutes- PT  18 minute(s)  -         Timed Charges    42875 - PT Therapeutic Exercise Minutes  6  -CS      96971 - PT Therapeutic Activity Minutes  12  -CS        User Key  (r) = Recorded By, (t) = Taken By, (c) = Cosigned By    Initials Name Provider Type    Yoana Gunn, FATOU Physical Therapist        Therapy Charges for Today     Code Description Service Date Service  Provider Modifiers Qty    [id number]  PT THERAPEUTIC ACT EA 15 MIN 2021 Yoana Gramajo, PT GP 1          PT G-Codes  Outcome Measure Options: AM-PAC 6 Clicks Basic Mobility (PT), Modified [place]  AM-PAC 6 Clicks Score (PT): 11  AM-PAC 6 Clicks Score (OT): 8  Modified [place] Scale: 4 - Moderately severe disability.  Unable to walk without assistance, and unable to attend to own bodily needs without assistance.    Yoana Gramajo, PT  2021      Electronically signed by Yoana Gramajo, PT at 21 1413          Occupational Therapy Notes (last 24 hours) (Notes from 21 through 21 09)      Romelia Nguyen, OT at 21 1305          Patient Name: Sebastian Winter  : 1940    MRN: [id number]                              Today's Date: 2021       Admit Date: 3/26/2021    Visit Dx:     ICD-10-CM ICD-9-CM   1. Aphasia  R47.01 784.3   2. Acute left-sided weakness  R53.1 728.87   3. Oropharyngeal dysphagia  R13.12 787.22   4. Dysarthria  R47.1 784.51     Patient Active Problem List   Diagnosis   • Acute left-sided weakness   • Dysarthria   • Dementia without behavioral disturbance (CMS/HCC)   • Hypothyroidism (acquired)   • GERD without esophagitis   • Cardiomyopathy (CMS/HCC)   • Essential hypertension   • Dyslipidemia     Past Medical History:   Diagnosis Date   • Dementia (CMS/HCC)    • Depression    • GERD (gastroesophageal reflux disease)    • Hypothyroid      Past Surgical History:   Procedure Laterality Date   • JOINT REPLACEMENT       General Information     Row Name 21 1434          OT Time and Intention    Document Type  therapy note (daily note)  -HK     Mode of Treatment  occupational therapy  -HK     Row Name 21 143          General Information    Existing Precautions/Restrictions  fall  -HK     Barriers to Rehab  cognitive status;medically complex;visual deficit  -HK     Row Name 21 143          Cognition    Orientation Status (Cognition)   oriented to;person;disoriented to;place;situation;time  -     Row Name 04/05/21 1434          Safety Issues, Functional Mobility    Safety Issues Affecting Function (Mobility)  safety precautions follow-through/compliance;safety precaution awareness;insight into deficits/self-awareness;judgment;problem-solving;ability to follow commands;sequencing abilities  -     Impairments Affecting Function (Mobility)  endurance/activity tolerance;strength;range of motion (ROM);postural/trunk control;balance;coordination;motor control;cognition;grasp  -HK     Cognitive Impairments, Mobility Safety/Performance  attention;awareness, need for assistance;insight into deficits/self-awareness;judgment;problem-solving/reasoning;safety precaution awareness;sequencing abilities;safety precaution follow-through  -       User Key  (r) = Recorded By, (t) = Taken By, (c) = Cosigned By    Initials Name Provider Type     Romelia Nguyen OT Occupational Therapist          Mobility/ADL's     Row Name 04/05/21 1437          Bed Mobility    Bed Mobility  rolling right;supine-sit;scooting/bridging  -     Rolling Right [place] (Bed Mobility)  verbal cues;maximum assist (25% patient effort)  -HK     Scooting/Bridging [place] (Bed Mobility)  verbal cues;maximum assist (25% patient effort)  -HK     Supine-Sit [place] (Bed Mobility)  verbal cues;maximum assist (25% patient effort);2 person assist  -HK     Bed Mobility, Safety Issues  cognitive deficits limit understanding;decreased use of arms for pushing/pulling;decreased use of legs for bridging/pushing;impaired trunk control for bed mobility  -     Assistive Device (Bed Mobility)  bed rails;draw sheet;head of bed elevated  -     Comment (Bed Mobility)  Pt requries increased processing time for all commands. maxA for all bed mobility.  -     Row Name 04/05/21 1437          Transfers    Transfers  sit-stand transfer;bed-chair transfer  -     Comment (Transfers)  Verbal  cues for safe hand placement and sequencing. Limtied by posterior lean in standing.  -HK     Bed-Chair [place] (Transfers)  verbal cues;maximum assist (25% patient effort);2 person assist  -HK     Assistive Device (Bed-Chair Transfers)  other (see comments) B UE support  -HK     Sit-Stand [place] (Transfers)  verbal cues;moderate assist (50% patient effort);2 person assist  -HK     Row Name 04/05/21 1437          Sit-Stand Transfer    Assistive Device (Sit-Stand Transfers)  other (see comments) B UE support  -HK     Row Name 04/05/21 1437          Functional Mobility    Functional Mobility- Ind. Level  not appropriate to assess  -     Row Name 04/05/21 1437          Activities of Daily Living    BADL Assessment/Intervention  feeding  -     Row Name 04/05/21 1437          Self-Feeding Assessment/Training    [place] Level (Feeding)  minimum assist (75% patient effort);liquids to mouth;scoop food and bring to mouth  -HK     Position (Self-Feeding)  supported sitting  -HK     Comment (Feeding)  OT issued T handle cup and scoop plate. Pt able to complete all self feeding with use of large  utensils, scoop plate, and T handle cup. Francis as well as verbal cues this date for feeding.  -     Row Name 04/05/21 1437          Toileting Assessment/Training    [place] Level (Toileting)  dependent (less than 25% patient effort)  -HK     Position (Toileting)  supine  -HK     Comment (Toileting)  Pt with noted bowel incontinence during bed mobility. Dependent for all chantell care.  -       User Key  (r) = Recorded By, (t) = Taken By, (c) = Cosigned By    Initials Name Provider Type     Romelia Nguyen, OT Occupational Therapist        Obj/Interventions     Row Name 04/05/21 1441          Sensory Assessment (Somatosensory)    Sensory Assessment (Somatosensory)  sensation intact  -     Row Name 04/05/21 1441          Balance    Balance Assessment  sitting static balance;sitting dynamic balance;standing  static balance  -HK     Static Sitting Balance  mild impairment;unsupported;sitting, edge of bed  -HK     Dynamic Sitting Balance  moderate impairment;unsupported;sitting, edge of bed  -HK     Static Standing Balance  moderate impairment;supported;standing  -HK     Dynamic Standing Balance  severe impairment;supported;standing  -HK     Balance Interventions  sitting;supported;dynamic;dynamic reaching;occupation based/functional task  -HK       User Key  (r) = Recorded By, (t) = Taken By, (c) = Cosigned By    Initials Name Provider Type     Romelia Nguyen OT Occupational Therapist        Goals/Plan    No documentation.       Clinical Impression     Row Name 04/05/21 1442          Pain Assessment    Additional Documentation  Pain Scale: Numbers Pre/Post-Treatment (Group)  -     Row Name 04/05/21 1442          Pain Scale: Numbers Pre/Post-Treatment    Pretreatment Pain Rating  0/10 - no pain  -HK     Posttreatment Pain Rating  0/10 - no pain  -HK     Pain Intervention(s)  Ambulation/increased activity;Repositioned  -     Row Name 04/05/21 1442          Plan of Care Review    Plan of Care Reviewed With  patient  -HK     Progress  improving  -     Outcome Summary  Pt requires maxA to roll in bed for [place] care and dependence for completion of chantell care. Pt maxAx2 to advance to EOB and required maxAx2 for bed to chair transfer. Pt sat supported in chair to complete self feeding. OT issued scoop plate and T handle cup in addition to large  utensils. Pt requires extra time/effort and Francis for all self feeding. Continue IP OT per POC.  -     Row Name 04/05/21 1442          Vital Signs    Pre Systolic BP Rehab  140  -HK     Pre Treatment Diastolic BP  73  -HK     Post Systolic BP Rehab  16  -HK     Post Treatment Diastolic BP  95  -HK     O2 Delivery Pre Treatment  room air  -HK     O2 Delivery Intra Treatment  room air  -HK     O2 Delivery Post Treatment  room air  -HK     Pre Patient Position  Supine  -HK      Intra Patient Position  Standing  -HK     Post Patient Position  Sitting  -HK     Row Name 04/05/21 1442          Positioning and Restraints    Pre-Treatment Position  in bed  -HK     Post Treatment Position  chair  -HK     In Chair  notified nsg;reclined;call light within reach;encouraged to call for assist;exit alarm on  -HK       User Key  (r) = Recorded By, (t) = Taken By, (c) = Cosigned By    Initials Name Provider Type     Romelia Nguyen OT Occupational Therapist        Outcome Measures     Row Name 04/05/21 1446          How much help from another is currently needed...    Putting on and taking off regular lower body clothing?  1  -HK     Bathing (including washing, rinsing, and drying)  1  -HK     Toileting (which includes using toilet bed pan or urinal)  1  -HK     Putting on and taking off regular upper body clothing  2  -HK     Taking care of personal grooming (such as brushing teeth)  2  -HK     Eating meals  3  -HK     AM-PAC 6 Clicks Score (OT)  10  -     Row Name 04/05/21 1446          Modified Dayton Scale    Modified Dayton Scale  4 - Moderately severe disability.  Unable to walk without assistance, and unable to attend to own bodily needs without assistance.  -     Row Name 04/05/21 1446          Functional Assessment    Outcome Measure Options  AM-PAC 6 Clicks Daily Activity (OT)  -HK       User Key  (r) = Recorded By, (t) = Taken By, (c) = Cosigned By    Initials Name Provider Type    Romelia Antonio OT Occupational Therapist        Occupational Therapy Education                 Title: PT OT SLP Therapies (In Progress)     Topic: Occupational Therapy (In Progress)     Point: ADL training (Done)     Description:   Instruct learner(s) on proper safety adaptation and remediation techniques during self care or transfers.   Instruct in proper use of assistive devices.              Learning Progress Summary           Patient Acceptance, E,TB,D, VU,NR by  at 4/5/2021 1447    Acceptance,  E,TB,D,H, VU,NR by  at 3/30/2021 1546    Nonacceptance, E, NL by  at 3/28/2021 0814    Comment: Educated pt on OT role and POC.                   Point: Home exercise program (Not Started)     Description:   Instruct learner(s) on appropriate technique for monitoring, assisting and/or progressing therapeutic exercises/activities.              Learner Progress:  Not documented in this visit.          Point: Precautions (Done)     Description:   Instruct learner(s) on prescribed precautions during self-care and functional transfers.              Learning Progress Summary           Patient Acceptance, E,TB,D, VU,NR by HK at 4/5/2021 1447    Acceptance, E,TB,D,H, VU,NR by  at 3/30/2021 1546                   Point: Body mechanics (Done)     Description:   Instruct learner(s) on proper positioning and spine alignment during self-care, functional mobility activities and/or exercises.              Learning Progress Summary           Patient Acceptance, E,TB,D, VU,NR by HK at 4/5/2021 1447    Acceptance, E,TB,D,H, VU,NR by  at 3/30/2021 1546                               User Key     Initials Effective Dates Name Provider Type Discipline     06/22/15 -  Rhoda Coker, OT Occupational Therapist OT     03/07/18 -  Romelia Nguyen, OT Occupational Therapist OT              OT Recommendation and Plan     Plan of Care Review  Plan of Care Reviewed With: patient  Progress: improving  Outcome Summary: Pt requires maxA to roll in bed for chantell care and dependence for completion of chantell care. Pt maxAx2 to advance to EOB and required maxAx2 for bed to chair transfer. Pt sat supported in chair to complete self feeding. OT issued scoop plate and T handle cup in addition to large  utensils. Pt requires extra time/effort and Francis for all self feeding. Continue IP OT per POC.     Time Calculation:   Time Calculation- OT     Row Name 04/05/21 1305             Time Calculation- OT    OT Start Time  1305  -      OT Received  On  04/05/21  -HK         Timed Charges    88988 - OT Self Care/Mgmt Minutes  30  -HK        User Key  (r) = Recorded By, (t) = Taken By, (c) = Cosigned By    Initials Name Provider Type    HK Romelia Nguyen OT Occupational Therapist        Therapy Charges for Today     Code Description Service Date Service Provider Modifiers Qty    [id number] HC OT SELF CARE/MGMT/TRAIN EA 15 MIN 4/5/2021 Romelia Nguyen OT GO 2               Romelia Nguyen OT  4/5/2021    Electronically signed by Romelia Nguyen OT at 04/05/21 1449     Romelia Nguyen OT at 04/05/21 1305        Goal Outcome Evaluation:  Plan of Care Reviewed With: patient  Progress: improving  Outcome Summary: Pt requires maxA to roll in bed for chantell care and dependence for completion of chantell care. Pt maxAx2 to advance to EOB and required maxAx2 for bed to chair transfer. Pt sat supported in chair to complete self feeding. OT issued scoop plate and T handle cup in addition to large  utensils. Pt requires extra time/effort and Francis for all self feeding. Continue IP OT per POC.    Electronically signed by Romelia Nguyen OT at 04/05/21 1448          Speech Language Pathology Notes (most recent note)      Blanca Lenz MS CCC-SLP at 03/31/21 1632        Goal Outcome Evaluation:     SLP treatment completed. Will continue to address dysphagia and cognitive communication in tx. Please see note for further details and recommendations.            Electronically signed by Blanca Lenz MS CCC-SLP at 03/31/21 4681

## 2021-04-06 NOTE — PLAN OF CARE
Goal Outcome Evaluation:  Plan of Care Reviewed With: patient  Progress: no change   SLP treatment completed. Will continue to address dysphagia and speech/language goals. Please see note for further details and recommendations.

## 2021-04-06 NOTE — PROGRESS NOTES
Continued Stay Note  [facility]     Patient Name: Sebastian Winter  MRN: [id number]  Today's Date: 4/6/2021    Admit Date: 3/26/2021    Discharge Plan     Row Name 04/06/21 1524       Plan    Plan  [facility]    Patient/Family in Agreement with Plan  yes    Plan Comments  Spoke Avelina with [facility] and they are interested in the patient. Avelina needs the doctor at [facility] to accept the patient and we should be able to transfer tomorrow or Thursday. Spoke with Jozef (daughter) by phone and she is in agreement with patient going to [facility]. CM will continue to follow.    Final Discharge Disposition Code  03 - skilled nursing facility (SNF)    Row Name 04/06/21 1315       Plan    Plan  [facility] or [facility]    Patient/Family in Agreement with Plan  yes    Plan Comments  Spoke with Monica at [facility] and they can offer a bed. Spoke with Jozef (daughter) and she wants a referral sent to [facility] before accepting the bed at [facility], Spoke with Avelina at [facility] and faxed referral. CM will continue to follow.    Final Discharge Disposition Code  03 - skilled nursing facility (SNF)        Discharge Codes    No documentation.       Expected Discharge Date and Time     Expected Discharge Date Expected Discharge Time    Apr 8, 2021             Hipolito Thompson RN

## 2021-04-06 NOTE — PROGRESS NOTES
"                  Clinical Nutrition     Reason for Visit:   Follow-up protocol    Patient Name: Sebastian Winter  YOB: 1940  MRN: [id number]  Date of Encounter: 04/06/21 08:11 EDT  Admission date: 3/26/2021    Nutrition Assessment   Assessment     Admission diagnosis  Dysarthria  CVA    Additional diagnosis/conditions/procedures this admission  L-sided weakness    (3/27) SLP eval, rec -puree, nectar thick liquids  (3/29) SLP eval - puree, nectar thick liquids  (3/30) SLP MBS, rec- puree, nectar thick liquids    Additional PMH/procedures:  HF  GERD  Hypothyroid  Dementia  Depression    Joint replacement    Reported/Observed/Food/Nutrition Related History:     4/6: Patient tolerating pureed diet with thickened liquids. Sitting up in bed, able to answer some questions. Reports he was not hungry this morning. Likes the Magic Cup supplements.    4/1: Patient continues to tolerate pureed diet well. Total feed. Per available PO data, consuming average of 50% of past meals. Patient reports eating well. Still full from breakfast. Denies any preferences.    3/29: Patient sitting in chair eating lunch at visit. No family present in room. Patient still with dysarthria but able to make out some of responses. Reports food was good today. RD observed patient ate ~50% at time of visit. He reports tolerating textures/consistencies well. Unsure if patient has tried Magic Cup supplement.    3/27: Noted results of SLP eval done today and recs for pureed texture foods with nectar thick liquids.  RD spoke with pt in room; difficult to obtain info r/t po/wt hx given pt's dysarthria. RD attempted to call Aranza Nelson(Friend) contact listed in EMR, but no answer when I called.      Anthropometrics     Height: 175.3 cm (69\")  Last filed wt: Weight: 62.1 kg (137 lb) (03/26/21 1524)    BMI: BMI (Calculated): 20.2  Normal: 18.5-24.9kg/m2    Ideal Body Weight (IBW) (kg): 73.69  Admission wt: 137 lb  Method obtained: weight per " charting 3/26      Labs reviewed     Results from last 7 days   Lab Units 04/03/21  0856   GLUCOSE mg/dL 97   BUN mg/dL 11   CREATININE mg/dL 1.05   SODIUM mmol/L 143   CHLORIDE mmol/L 106   POTASSIUM mmol/L 3.7   MAGNESIUM mg/dL 2.0           Invalid input(s): PLAT        Lab Results   Lab Value Date/Time    HGBA1C 5.40 03/27/2021 0730       Medications reviewed   Pertinent: coreg, klonopin, aricept, fludrocortisone, synthroid, protonix      Intake/Output 24 hrs (7:00AM - 6:59 AM)     Intake & Output (last day)       04/05 0701 - 04/06 0700 04/06 0701 - 04/07 0700    P.O.      Total Intake(mL/kg)      Urine (mL/kg/hr) 600 (0.4)     Total Output 600     Net -600                 Current Nutrition Prescription     PO: Diet Dysphagia; II - Pureed; Nectar / Syrup Thick; Other, No Straws; small sips; Cardiac  Dietary Nutrition Supplements: Magic Cup Daily  Intake: 31% x 9 meals, patient also taking supplements      Nutrition Diagnosis     3/27 updated 3/29, 4/1, 4/6  Problem Swallowing difficulty   Etiology Dysphagia   Signs/Symptoms SLP eval 3/27 - puree, NTL   Status: ongoing    3/29 updated 4/1, 4/6  Problem Inadequate oral intake   Etiology Dysphagia, modified diet   Signs/Symptoms PO Intake (31% x 9 meals)   Status: ongoing    Nutrition Intervention     1.  Follow treatment progress, Care plan reviewed  2.  Advise alternate selection, Interview for preferences   3. Cont to send oral nutrition supplement - Magic Cup x2/day  4. Encouraged oral intake    Goal:   General: Nutrition support treatment  PO: Increase intake      Monitoring/Evaluation:   Per protocol, PO intake, Supplement intake, Pertinent labs, Weight, Symptoms      Veronika Vasquez, ALEXN, LD  Time Spent: 30 minutes

## 2021-04-06 NOTE — PROGRESS NOTES
[facility] Medicine Services  PROGRESS NOTE    Patient Name: Sebastian Winter  : 1940  MRN: [id number]    Date of Admission: 3/26/2021  Primary Care Physician: Yoav Bee MD    Subjective   Subjective     CC: Follow-up CVA    HPI: No acute events overnight, patient however is a touch confused, speech is dysarthric, unable to decipher what he was mumbling      ROS:  Unable to obtain complete ROS due to confusion, dysarthria    Objective   Objective     Vital Signs:   Temp:  [97.7 °F (36.5 °C)-97.9 °F (36.6 °C)] 97.7 °F (36.5 °C)  Heart Rate:  [54-65] 65  Resp:  [16] 16  BP: (133-160)/(65-90) 143/81  Total (NIH Stroke Scale): 4     Physical Exam:  Constitutional: Chronically ill-appearing elderly male, in no acute distress, awake, alert  HENT: NCAT, mucous membranes moist  Respiratory: Clear to auscultation bilaterally, respiratory effort normal   Cardiovascular: RRR, no murmurs, rubs, or gallops  Gastrointestinal: Positive bowel sounds, soft, nontender, nondistended  Musculoskeletal: No bilateral ankle edema  Psychiatric: Appropriate affect, cooperative  Neurologic: Dysarthric speech, confusion  Skin: No rashes    Results Reviewed:  Results from last 7 days   Lab Units 21  0516   WBC 10*3/mm3 7.13   HEMOGLOBIN g/dL 13.3   HEMATOCRIT % 42.3   PLATELETS 10*3/mm3 182     Results from last 7 days   Lab Units 21  0856   SODIUM mmol/L 143   POTASSIUM mmol/L 3.7   CHLORIDE mmol/L 106   CO2 mmol/L 31.0*   BUN mg/dL 11   CREATININE mg/dL 1.05   GLUCOSE mg/dL 97   CALCIUM mg/dL 8.6     Estimated Creatinine Clearance: 47.6 mL/min (by C-G formula based on SCr of 1.05 mg/dL).    Microbiology Results Abnormal     Procedure Component Value - Date/Time    COVID PRE-OP / PRE-PROCEDURE SCREENING ORDER (NO ISOLATION) - Swab, Nasopharynx [[id number]]  (Normal) Collected: 21    Lab Status: Final result Specimen: Swab from Nasopharynx Updated: 21    Narrative:       The following orders were created for panel order COVID PRE-OP / PRE-PROCEDURE SCREENING ORDER (NO ISOLATION) - Swab, Nasopharynx.  Procedure                               Abnormality         Status                     ---------                               -----------         ------                     COVID-19 and FLU A/B PCR...[[id number]]  Normal              Final result                 Please view results for these tests on the individual orders.    COVID-19 and FLU A/B PCR - Swab, Nasopharynx [[id number]]  (Normal) Collected: 03/26/21 1816    Lab Status: Final result Specimen: Swab from Nasopharynx Updated: 03/26/21 1852     COVID19 Not Detected     Influenza A PCR Not Detected     Influenza B PCR Not Detected    Narrative:      Fact sheet for providers: https://www.fda.gov/media/549795/download    Fact sheet for patients: https://www.fda.gov/media/051953/download    Test performed by PCR.          Imaging Results (Last 24 Hours)     ** No results found for the last 24 hours. **          Results for orders placed during the hospital encounter of 03/26/21    Adult Transthoracic Echo Complete W/ Cont if Necessary Per Protocol (With Agitated Saline)    Interpretation Summary  · Estimated left ventricular EF = 35% Left ventricular systolic function is moderately decreased. Inferoposterior hypokinesis  · Trace to mild mitral valve regurgitation is present.  · Trace tricuspid valve regurgitation is present.  · Saline test results are negative for intracardiac shunting.  · No obvious cardiac source for embolus is seen.      I have reviewed the medications:  Scheduled Meds:aspirin, 325 mg, Oral, Daily   Or  aspirin, 300 mg, Rectal, Daily  atorvastatin, 80 mg, Oral, Nightly  carvedilol, 3.125 mg, Oral, BID With Meals  clonazePAM, 1 mg, Oral, Nightly  clopidogrel, 75 mg, Oral, Daily  donepezil, 5 mg, Oral, Nightly  enoxaparin, 40 mg, Subcutaneous, Daily  fludrocortisone, 100 mcg, Oral, Daily  levothyroxine, 75 mcg,  Oral, Q AM  pantoprazole, 40 mg, Oral, Q AM  sertraline, 50 mg, Oral, Daily  sodium chloride, 10 mL, Intravenous, Q12H      Continuous Infusions:   PRN Meds:.•  acetaminophen  •  acetaminophen  •  clonazePAM  •  magnesium sulfate **OR** magnesium sulfate **OR** magnesium sulfate  •  potassium chloride **OR** potassium chloride **OR** potassium chloride  •  sodium chloride  •  sodium chloride    Assessment/Plan   Assessment & Plan     Active Hospital Problems    Diagnosis  POA   • **Dysarthria [R47.1]  Yes   • Cardiomyopathy (CMS/HCC) [I42.9]  Yes   • Essential hypertension [I10]  Yes   • Dyslipidemia [E78.5]  Yes   • Acute left-sided weakness [R53.1]  Yes   • Dementia without behavioral disturbance (CMS/HCC) [F03.90]  Yes   • Hypothyroidism (acquired) [E03.9]  Yes   • GERD without esophagitis [K21.9]  Yes      Resolved Hospital Problems   No resolved problems to display.        Brief Hospital Course to date:  Sebastian Winter is a 81 y.o. male with recent CVA treated at [facility] with no residual deficit per family, subsequently transitioned to long term care at [facility].   Sent to [facility] ED with acute L side weakness and dysarthria similar to his recent stroke as well as right sided facial droop.      This patient's problems and plans were partially entered by my partner and updated as appropriate by me 04/06/21.     Dysarthria and left sided weakness, acute  HX recent CVA  - imaging (CT head, CT perfusion, CTA head/neck, MRI brain) shows old lacunar infarct in the bud but no acute abnormality per radiology interpretation.  - unclear if this is exac of prev stroke vs new stroke; r/o cardioembolic.  Stroke team has signed off.    - P2Y12 103:  approp inhibition noted  - echo - EF 35, bubble study neg   -Continue ASA, plavix, atorvastatin  -cardiac event monitor at DC: Alex - discussed w dr loza      Diastolic Heart Failure  -EF 35% , grade I,   -cardiology consulted and started carvedilol , signed  off  -had LHC at [facility], with some CAD but no LETICIA      Dementia  -Son-in-law reports he normally does well when he gets his nightly clonazepam  -continue clonazepam 1 mg at HS  -continue Aricept     Hypothyroidism  -Continue synthroid     GERD  -Continue PPI     Vitamin B12 deficiency  Vitamin D-deficiency  -On replacement at SNF     DVT Prophylaxis: Lovenox     Disposition: TBD, anticipate discharge to rehab, CM following    All problems listed above are new to me this is my first encounter with patient.    CODE STATUS:   Code Status and Medical Interventions:   Ordered at: 03/26/21 1741     Limited Support to NOT Include:    Antiarrhythmic Drugs    Cardioversion/Defibrillation    Intubation     Code Status:    No CPR     Medical Interventions (Level of Support Prior to Arrest):    Limited       Kirt Lowe MD  04/06/21

## 2021-04-06 NOTE — THERAPY TREATMENT NOTE
Acute Care - Speech Language Pathology   [facility]   + cognitive-communication [facility]     Patient Name: Sebastian Winter  : 1940  MRN: [id number]  Today's Date: 2021               Admit Date: 3/26/2021    Visit Dx:     ICD-10-CM ICD-9-CM   1. Aphasia  R47.01 784.3   2. Acute left-sided weakness  R53.1 728.87   3. Oropharyngeal dysphagia  R13.12 787.22   4. Dysarthria  R47.1 784.51     Patient Active Problem List   Diagnosis   • Acute left-sided weakness   • Dysarthria   • Dementia without behavioral disturbance (CMS/HCC)   • Hypothyroidism (acquired)   • GERD without esophagitis   • Cardiomyopathy (CMS/HCC)   • Essential hypertension   • Dyslipidemia     Past Medical History:   Diagnosis Date   • Dementia (CMS/HCC)    • Depression    • GERD (gastroesophageal reflux disease)    • Hypothyroid      Past Surgical History:   Procedure Laterality Date   • JOINT REPLACEMENT          SWALLOW EVALUATION (last 72 hours)      SLP Adult Swallow Evaluation     Row Name 21 1400 21 1045                Rehab Evaluation    Document Type  therapy note (daily note)  -CJ  therapy note (daily note)  (Pended)   -LB       Subjective Information  no complaints  -CJ  no complaints  (Pended)   -LB       Patient Observations  alert;cooperative;agree to therapy  -CJ  alert;cooperative;agree to therapy  (Pended)   -LB       Patient/Family/Caregiver Comments/Observations  no family present  -CJ  --       Care Plan Review  evaluation/treatment results reviewed;patient/other agree to care plan  -CJ  --       Patient Effort  good  -CJ  --       Symptoms Noted During/After Treatment  none  -CJ  --          Pain    Additional Documentation  Pain Scale: FACES Pre/Post-Treatment (Group)  -CJ  Pain Scale: Numbers Pre/Post-Treatment (Group)  (Pended)   -LB          Pain Scale: FACES Pre/Post-Treatment    Pain: FACES Scale, Pretreatment  0-->no hurt  -CJ  --       Posttreatment Pain Rating  0-->no  hurt  -CJ  --          Oral Motor Structure and Function    Dentition Assessment  --  natural, present and adequate  (Pended)   -LB       Secretion Management  --  WNL/WFL  (Pended)   -LB       Mucosal Quality  --  moist, healthy  (Pended)   -LB          Oral Musculature and Cranial Nerve Assessment    Oral Motor General Assessment  --  generalized oral motor weakness;oral labial or buccal impairment  (Pended)   -LB       Oral Labial or Buccal Impairment, Detail, Cranial Nerve VII (Facial):  --  right labial droop;reduced ROM;reduced strength bilaterally  (Pended)   -LB          Respiratory    Respiratory Status  --  WFL  (Pended)   -LB          Clinical Impression    Daily Summary of Progress (SLP)  progress toward functional goals as expected  -CJ  --       SLP Swallowing Diagnosis  mod-severe;oral dysphagia;mild-moderate;pharyngeal dysphagia  -CJ  mod-severe;oral dysphagia;mild-moderate;pharyngeal dysphagia  (Pended)   -LB       Functional Impact  risk of aspiration/pneumonia  -CJ  risk of aspiration/pneumonia  (Pended)   -LB       Rehab Potential/Prognosis, Swallowing  good, to achieve stated therapy goals  -CJ  good, to achieve stated therapy goals  (Pended)   -LB       Swallow Criteria for Skilled Therapeutic Interventions Met  demonstrates skilled criteria  -CJ  demonstrates skilled criteria  (Pended)   -LB       Plan for Continued Treatment (SLP)  Pt participated in dysphagia and cog-comm tx this pm at bedside. No overt s/s of aspiration w/ nectar thick liquids. Pt performed dysphagia tx exercises as well as language/dysarthria tx goals. He would benefit from further skilled services. Will continue to address.  -CJ  --          Recommendations    Therapy Frequency (Swallow)  5 days per week  -CJ  --       Predicted Duration Therapy Intervention (Days)  until discharge  -CJ  --       SLP Diet Recommendation  puree;nectar thick liquids  -CJ  --       Recommended Diagnostics  VFSS (MBS)  -CJ  --        Recommended Precautions and Strategies  upright posture during/after eating;small bites of food and sips of liquid;no straw;check mouth frequently for oral residue/pocketing;general aspiration precautions;reflux precautions;1:1 supervision  -  --       Oral Care Recommendations  Oral Care BID/PRN  -  --       SLP Rec. for Method of Medication Administration  meds crushed;with pudding or applesauce;as tolerated  -  --       Monitor for Signs of Aspiration  yes;notify SLP if any concerns  -  --       Anticipated Discharge Disposition (SLP)  unknown;anticipate therapy at next level of care  -  --          Labial Strengthening Goal 1 (SLP)    Barriers (Labial Strengthening Goal 1, SLP)  --  Pt had difficulty completing labial resistance exercises.  (Pended)   -LB          Swallow Compensatory Strategies Goal 1 (SLP)    Barriers (Swallow Compensatory Strategies/Techniques Goal 1, SLP)  --  Pt unable to independently recall strategies.   (Pended)   -LB       Progress/Outcomes (Swallow Compensatory Strategies/Techniques Goal 1, SLP)  --  progress slower than expected  (Pended)   -LB         User Key  (r) = Recorded By, (t) = Taken By, (c) = Cosigned By    Initials Name Effective Dates    Kadi Robles, MS CCC-SLP 02/11/20 -     LB Chuckie Hunt, Speech Therapy Student 03/09/21 -           EDUCATION  The patient has been educated in the following areas:   Cognitive Impairment Communication Impairment Dysphagia (Swallowing Impairment) Oral Care/Hydration Modified Diet Instruction.    SLP Recommendation and Plan  SLP Swallowing Diagnosis: mod-severe, oral dysphagia, mild-moderate, pharyngeal dysphagia  SLP Diet Recommendation: puree, nectar thick liquids  Recommended Precautions and Strategies: upright posture during/after eating, small bites of food and sips of liquid, no straw, check mouth frequently for oral residue/pocketing, general aspiration precautions, reflux precautions, 1:1 supervision  SLP Rec.  for Method of Medication Administration: meds crushed, with pudding or applesauce, as tolerated     Monitor for Signs of Aspiration: yes, notify SLP if any concerns  Recommended Diagnostics: VFSS (MBS)  Swallow Criteria for Skilled Therapeutic Interventions Met: demonstrates skilled criteria  Anticipated Discharge Disposition (SLP): unknown, anticipate therapy at next level of care  Rehab Potential/Prognosis, Swallowing: good, to achieve stated therapy goals  Therapy Frequency (Swallow): 5 days per week  Predicted Duration Therapy Intervention (Days): until discharge     Daily Summary of Progress (SLP): progress toward functional goals as expected    Plan for Continued Treatment (SLP): Pt participated in dysphagia and cog-comm tx this pm at bedside. No overt s/s of aspiration w/ nectar thick liquids. Pt performed dysphagia tx exercises as well as language/dysarthria tx goals. He would benefit from further skilled services. Will continue to address.              Plan of Care Reviewed With: patient  Progress: no change    SLP GOALS     Row Name 04/06/21 1400 04/05/21 1045          Oral Nutrition/Hydration Goal 1 (SLP)    Oral Nutrition/Hydration Goal 1, SLP  LTG: Pt will demonstrate functional swallow for regular/thin diet with use of compensatory strategies as needed.  -CJ  LTG: Pt will demonstrate functional swallow for regular/thin diet with use of compensatory strategies as needed.  (Pended)   -LB     Time Frame (Oral Nutrition/Hydration Goal 1, SLP)  by discharge  -CJ  by discharge  (Pended)   -LB     Progress/Outcomes (Oral Nutrition/Hydration Goal 1, SLP)  goal ongoing  -CJ  continuing progress toward goal  (Pended)   -LB        Oral Nutrition/Hydration Goal 2 (SLP)    Oral Nutrition/Hydration Goal 2, SLP  Pt will tolerate current diet of pureed solids and nectar-thick liquids with no s/sx aspiration with 100% accuracy.  -CJ  Pt will tolerate current diet of pureed solids and nectar-thick liquids with no s/sx  aspiration with 100% accuracy.  (Pended)   -LB     Time Frame (Oral Nutrition/Hydration Goal 2, SLP)  short term goal (STG)  -CJ  short term goal (STG)  (Pended)   -LB     Barriers (Oral Nutrition/Hydration Goal 2, SLP)  no overt s/s of aspiration w/ nectar thick or puree consistencies  -CJ  Pt tolerated 3 trials of applesauce via teaspioon w/o s/sx   (Pended)   -LB     Progress/Outcomes (Oral Nutrition/Hydration Goal 2, SLP)  continuing progress toward goal  -CJ  continuing progress toward goal  (Pended)   -LB        Oral Nutrition/Hydration Goal (SLP)    Oral Nutrition/Hydration Goal, SLP  Pt will accept trials of thin liquids with no s/sx aspiration w/ 60% accuracy to determine readiness of diet upgrade.  -CJ  Pt will accept trials of thin liquids with no s/sx aspiration w/ 60% accuracy to determine readiness of diet upgrade.  (Pended)   -LB     Time Frame (Oral Nutrition/Hydration Goal, SLP)  short term goal (STG)  -CJ  short term goal (STG)  (Pended)   -LB     Barriers (Oral Nutrition/Hydration Goal, SLP)  --  no s/sx of aspiration  (Pended)   -LB     Progress/Outcomes (Oral Nutrition/Hydration Goal, SLP)  goal ongoing  -CJ  continuing progress toward goal  (Pended)   -LB        Labial Strengthening Goal 1 (SLP)    Activity (Labial Strengthening Goal 1, SLP)  increase labial tone;increase labial sensation/afferent drive  -CJ  increase labial tone;increase labial sensation/afferent drive  (Pended)   -LB     Increase Labial Tone  labial resistance exercises  -CJ  labial resistance exercises  (Pended)   -LB     Increase Labial Sensation/Afferent Drive  swallow trials  -CJ  swallow trials  (Pended)   -LB     [place]/Accuracy (Labial Strengthening Goal 1, SLP)  with moderate cues (50-74% accuracy)  -CJ  with moderate cues (50-74% accuracy)  (Pended)   -LB     Time Frame (Labial Strengthening Goal 1, SLP)  short term goal (STG)  -CJ  short term goal (STG)  (Pended)   -LB     Barriers (Labial Strengthening Goal  1, SLP)  pt performed x2 sets x5 reps   -CJ  Pt had difficulty completing labial resistance exercises.  (Pended)   -LB     Progress/Outcomes (Labial Strengthening Goal 1, SLP)  continuing progress toward goal  -CJ  goal ongoing  (Pended)   -LB        Lingual Strengthening Goal 1 (SLP)    Activity (Lingual Strengthening Goal 1, SLP)  increase lingual tone/sensation/control/coordination/movement;increase tongue back strength  -CJ  increase lingual tone/sensation/control/coordination/movement;increase tongue back strength  (Pended)   -LB     Increase Lingual Tone/Sensation/Control/Coordination/Movement  lingual movement exercises;lingual resistance exercises  -CJ  lingual movement exercises;lingual resistance exercises  (Pended)   -LB     Increase Tongue Back Strength  lingual resistance exercises  -CJ  lingual resistance exercises  (Pended)   -LB     [place]/Accuracy (Lingual Strengthening Goal 1, SLP)  with moderate cues (50-74% accuracy)  -CJ  with moderate cues (50-74% accuracy)  (Pended)   -LB     Time Frame (Lingual Strengthening Goal 1, SLP)  short term goal (STG)  -CJ  short term goal (STG)  (Pended)   -LB     Barriers (Lingual Strengthening Goal 1, SLP)  Pt performed x2 sets x5 reps  -CJ  Pt pushed tongue against fork (tongue needed flater surface) for tongue strengthening exercise.  (Pended)   -LB     Progress/Outcomes (Lingual Strengthening Goal 1, SLP)  continuing progress toward goal  -CJ  continuing progress toward goal  (Pended)   -LB        Pharyngeal Strengthening Exercise Goal 1 (SLP)    Activity (Pharyngeal Strengthening Goal 1, SLP)  increase timing;increase superior movement of the hyolaryngeal complex;increase anterior movement of the hyolaryngeal complex;increase closure at entrance to airway/closure of airway at glottis;increase squeeze/positive pressure generation;increase tongue base retraction  -CJ  increase timing;increase superior movement of the hyolaryngeal complex;increase anterior  movement of the hyolaryngeal complex;increase closure at entrance to airway/closure of airway at glottis;increase squeeze/positive pressure generation;increase tongue base retraction  (Pended)   -LB     Increase Timing  prepping - 3 second prep or suck swallow or 3-step swallow  -CJ  prepping - 3 second prep or suck swallow or 3-step swallow  (Pended)   -LB     Increase Superior Movement of the Hyolaryngeal Complex  falsetto  -CJ  falsetto  (Pended)   -LB     Increase Anterior Movement of the Hyolaryngeal Complex  chin tuck against resistance (CTAR)  -CJ  chin tuck against resistance (CTAR)  (Pended)   -LB     Increase Closure at Entrance to Airway/Closure of Airway at Glottis  super-supraglottic swallow  -CJ  super-supraglottic swallow  (Pended)   -LB     Increase Squeeze/Positive Pressure Generation  effortful pitch glide (falsetto + pharyngeal squeeze)  -CJ  effortful pitch glide (falsetto + pharyngeal squeeze)  (Pended)   -LB     Increase Tongue Base Retraction  hard effortful swallow  -CJ  hard effortful swallow  (Pended)   -LB     [place]/Accuracy (Pharyngeal Strengthening Goal 1, SLP)  with moderate cues (50-74% accuracy)  -CJ  with moderate cues (50-74% accuracy)  (Pended)   -LB     Time Frame (Pharyngeal Strengthening Goal 1, SLP)  short term goal (STG)  -CJ  short term goal (STG)  (Pended)   -LB     Barriers (Pharyngeal Strengthening Goal 1, SLP)  Pt performed CTAR x2 set x3 reps; falsetto x2 sets x5 reps; effortful swallow x2 sets x5 reps  -CJ  Pt completed 3 trials of CTAR, 3 second prep, and falsetto pitch glide. Pt unable to complete effortful swallow trials.   (Pended)   -LB     Progress/Outcomes (Pharyngeal Strengthening Goal 1, SLP)  continuing progress toward goal  -CJ  --        Swallow Compensatory Strategies Goal 1 (SLP)    Activity (Swallow Compensatory Strategies/Techniques Goal 1, SLP)  compensatory strategies;postural techniques;small bites;small cup sips  -CJ  compensatory  strategies;postural techniques;small bites;small cup sips  (Pended)   -LB     [place]/Accuracy (Swallow Compensatory Strategies/Techniques Goal 1, SLP)  with minimal cues (75-90% accuracy)  -CJ  with minimal cues (75-90% accuracy)  (Pended)   -LB     Time Frame (Swallow Compensatory Strategies/Techniques Goal 1, SLP)  short term goal (STG)  -CJ  short term goal (STG)  (Pended)   -LB     Barriers (Swallow Compensatory Strategies/Techniques Goal 1, SLP)  Pt required min cues for small bites/sips  -CJ  Pt unable to independently recall strategies.   (Pended)   -LB     Progress/Outcomes (Swallow Compensatory Strategies/Techniques Goal 1, SLP)  continuing progress toward goal  -CJ  progress slower than expected  (Pended)   -LB        Words/Phrases/Sentences Goal 1 (SLP)    Improve Ability to Comprehend Words/Phrases/Sentences Through: Goal 1 (SLP)  identify body part;identify familiar objects;80%;with minimal cues (75-90%)  -CJ  identify body part;identify familiar objects;80%;with minimal cues (75-90%)  (Pended)   -LB     Time Frame (Identify Objects and Pictures Goal 1, SLP)  short term goal (STG)  -CJ  short term goal (STG)  (Pended)   -LB     Progress (Ability to Contruct Words/Phrases/Sentences Goal 1, SLP)  50%;with moderate cues (50-74%)  -CJ  --     Progress/Outcomes (Identify Objects and Pictures Goal 1, SLP)  continuing progress toward goal  -CJ  goal ongoing  (Pended)   -LB        Comprehend Questions Goal 1 (SLP)    Improve Ability to Comprehend Questions Goal 1 (SLP)  simple yes/no questions;80%;with minimal cues (75-90%)  -CJ  simple yes/no questions;80%;with minimal cues (75-90%)  (Pended)   -LB     Time Frame (Comprehend Questions Goal 1, SLP)  short term goal (STG)  -CJ  short term goal (STG)  (Pended)   -LB     Progress (Ability to Comprehend Questions Goal 1, SLP)  70%;with minimal cues (75-90%)  -CJ  50%;with moderate cues (50-74%)  (Pended)   -LB     Progress/Outcomes (Comprehend Questions Goal  1, SLP)  continuing progress toward goal  -CJ  progress slower than expected  (Pended)   -LB        Follow Directions Goal 2 (SLP)    Improve Ability to Follow Directions Goal 1 (SLP)  1 step direction without objects;with minimal cues (75-90%);2 step commands;with moderate cues (50-74%);80%  -CJ  1 step direction without objects;with minimal cues (75-90%);2 step commands;with moderate cues (50-74%);80%  (Pended)   -LB     Time Frame (Follow Directions Goal 1, SLP)  short term goal (STG)  -CJ  short term goal (STG)  (Pended)   -LB     Progress (Ability to Follow Directions Goal 1, SLP)  70%;with minimal cues (75-90%)  -CJ  60%;with moderate cues (50-74%)  (Pended)   -LB     Progress/Outcomes (Follow Directions Goal 1, SLP)  continuing progress toward goal  -CJ  continuing progress toward goal  (Pended)   -LB     Comment (Follow Directions Goal 1, SLP)  --  Pt able to follow directions for treatment exercises and compensatory strategies.  (Pended)   -LB        Word Retrieval Skills Goal 1 (SLP)    Improve Word Retrieval Skills By Goal 1 (SLP)  confrontational naming task;responsive naming task;repeating words;completing open ended structured sentence;80%;with minimal cues (75-90%)  -CJ  confrontational naming task;responsive naming task;repeating words;completing open ended structured sentence;80%;with minimal cues (75-90%)  (Pended)   -LB     Time Frame (Word Retrieval Goal 1, SLP)  short term goal (STG)  -CJ  short term goal (STG)  (Pended)   -LB     Progress (Word Retrieval Skills Goal 1, SLP)  70%;with minimal cues (75-90%)  -CJ  --     Progress/Outcomes (Word Retrieval Goal 1, SLP)  continuing progress toward goal  -CJ  goal ongoing  (Pended)   -LB     Comment (Word Retrieval Goal 1, SLP)  completing open ended sentence.   -CJ  --        Phonation Goal 1 (SLP)    Improve Phonation By Goal 1 (SLP)  using loud speech;80%;with minimal cues (75-90%)  -CJ  using loud speech;80%;with minimal cues (75-90%)  (Pended)    -LB     Time Frame (Phonation Goal 1, SLP)  short term goal (STG)  -CJ  short term goal (STG)  (Pended)   -LB     Progress (Phonation Goal 1, SLP)  70%;with moderate cues (50-74%)  -CJ  --     Progress/Outcomes (Phonation Goal 1, SLP)  continuing progress toward goal  -CJ  --     Comment (Phonation Goal 1, SLP)  loud speech improved at word level  -CJ  --        Articulation Goal 1 (SLP)    Improve Articulation Goal 1 (SLP)  by over-articulating at word level;80%;with moderate cues (50-74%)  -CJ  by over-articulating at word level;80%;with moderate cues (50-74%)  (Pended)   -LB     Time Frame (Articulation Goal 1, SLP)  short term goal (STG)  -CJ  short term goal (STG)  (Pended)   -LB     Progress (Articulation Goal 1, SLP)  40%;with moderate cues (50-74%)  -CJ  60%;with maximum cues (25-49%)  (Pended)   -LB     Progress/Outcomes (Articulation Goal 1, SLP)  continuing progress toward goal  -CJ  progress slower than expected  (Pended)   -LB     Comment (Articulation Goal 1, SLP)  --  Pt required continuous prompts to use overarticulated speech.  (Pended)   -LB        Additional Goal 1 (SLP)    Additional Goal 1, SLP  LTG: Pt will improve communication skills to actively participate in care w/ 80% accuracy and min cues.   -CJ  LTG: Pt will improve communication skills to actively participate in care w/ 80% accuracy and min cues.   (Pended)   -LB     Time Frame (Additional Goal 1, SLP)  by discharge  -CJ  by discharge  (Pended)   -LB     Progress/Outcomes (Additional Goal 1, SLP)  continuing progress toward goal  -CJ  continuing progress toward goal  (Pended)   -LB       User Key  (r) = Recorded By, (t) = Taken By, (c) = Cosigned By    Initials Name Provider Type    Kadi Robles MS CCC-SLP Speech and Language Pathologist    Chuckie Pérez, Speech Therapy Student Speech Therapy Student             Time Calculation:   Time Calculation- SLP     Row Name 04/06/21 3796             Time Calculation- SLP    SLP  Start Time  1400  -      SLP Received On  04/06/21  -FREDDY        User Key  (r) = Recorded By, (t) = Taken By, (c) = Cosigned By    Initials Name Provider Type    Kadi Robles MS CCC-KATEY Speech and Language Pathologist          Therapy Charges for Today     Code Description Service Date Service Provider Modifiers Qty    51652493919 HC ST TREATMENT SPEECH 2 4/6/2021 Kadi Nelson MS CCC-SLP GN 1    72309641609 HC ST TREATMENT SWALLOW 2 4/6/2021 Kadi Nelson MS CCC-SLP GN 1        Patient was not wearing a face mask and did not exhibit coughing during this therapy encounter.  Procedure performed was aerosolizing, involved close contact (within 6 feet for at least 15 minutes or longer), and did not involve contact with infectious secretions or specimens.  Therapist used appropriate personal protective equipment including gloves, standard procedure mask and eye protection.  Appropriate PPE was worn during the entire therapy session.  Hand hygiene was completed before and after therapy session.          MS OMID Perez  4/6/2021

## 2021-04-06 NOTE — DISCHARGE PLACEMENT REQUEST
"Sebastian Winter (81 y.o. Male)   Jorge Thompson, RN Case Manager  [phone]    Date of Birth Social Security Number Address Home Phone MRN    1940  [address] [place] [address] [phone] [id number]    Confucianist Marital Status          Unknown        Admission Date Admission Type Admitting Provider Attending Provider Department, Room/Bed    3/26/21 Emergency Kirt Lowe MD Opii, [place], MD [facility] 3F, S319/1    Discharge Date Discharge Disposition Discharge Destination                       Attending Provider: Kirt Lowe MD    Allergies: No Known Allergies    Isolation: None   Infection: None   Code Status: No CPR    Ht: 175.3 cm (69\")   Wt: 62.1 kg (137 lb)    Admission Cmt: None   Principal Problem: Dysarthria [R47.1]                 Active Insurance as of 3/26/2021     Primary Coverage     Payor Plan Insurance Group Employer/Plan Group    MEDICARE MEDICARE A & B      Payor Plan Address Payor Plan Phone Number Payor Plan Fax Number Effective Dates    [address] [phone]  1/1/2004 - None Entered    [place] [id number]       Subscriber Name Subscriber Birth Date Member ID       SEBASTIAN WINTER 1940 [id number]                 Emergency Contacts      (Rel.) Home Phone Work Phone Mobile Phone    Aranza Nelson (Friend) [phone] -- --    ZulemaNola (Daughter) [phone] -- --    Jozef Lock (Daughter) [phone] -- --            Vital Signs (last day)     Date/Time   Temp   Temp src   Pulse   Resp   BP   Patient Position   SpO2    04/06/21 0325   97.7 (36.5)   Oral   60   16   152/75   Lying   --    04/05/21 2312   97.9 (36.6)   Oral   59   16   160/77   Lying   95    04/05/21 1855   97.7 (36.5)   Oral   56   16   160/81   Lying   94    04/05/21 1613   97.8 (36.6)   Oral   61   16   154/90   Sitting   --    04/05/21 1139   97.8 (36.6)   Oral   54   16   133/65   Lying   --    04/05/21 0950   --   --   61   --   152/75   " --   --    04/05/21 0812   97.7 (36.5)   Axillary   50   16   142/77   Lying   94    04/05/21 0350   97.8 (36.6)   Oral   75   18   152/83   Lying   93              Current Facility-Administered Medications   Medication Dose Route Frequency Provider Last Rate Last Admin   • acetaminophen (TYLENOL) suppository 650 mg  650 mg Rectal Q4H PRN Gwen Riley PA       • acetaminophen (TYLENOL) tablet 650 mg  650 mg Oral Q6H PRN Gwen Riley PA   650 mg at 04/05/21 0950   • aspirin tablet 325 mg  325 mg Oral Daily Darlene Mccallum APRN   325 mg at 04/05/21 0950    Or   • aspirin suppository 300 mg  300 mg Rectal Daily Darlene Mccallum APRN       • atorvastatin (LIPITOR) tablet 80 mg  80 mg Oral Nightly Darlene Mccallum APRN   80 mg at 04/05/21 2321   • carvedilol (COREG) tablet 3.125 mg  3.125 mg Oral BID With Meals Eber Dodge APRN   3.125 mg at 04/05/21 1706   • clonazePAM (KlonoPIN) tablet 0.5 mg  0.5 mg Oral Daily PRN Lori Barrett MD   0.5 mg at 03/31/21 2116   • clonazePAM (KlonoPIN) tablet 1 mg  1 mg Oral Nightly Lori Barrett MD   1 mg at 04/05/21 2321   • clopidogrel (PLAVIX) tablet 75 mg  75 mg Oral Daily Darlene Mccallum APRN   75 mg at 04/05/21 0951   • donepezil (ARICEPT) tablet 5 mg  5 mg Oral Nightly Kevin Lucia MD   5 mg at 04/05/21 2322   • enoxaparin (LOVENOX) syringe 40 mg  40 mg Subcutaneous Daily Lori Barrett MD   40 mg at 04/05/21 0949   • fludrocortisone tablet 100 mcg  100 mcg Oral Daily Vilma James MD   100 mcg at 04/05/21 0951   • levothyroxine (SYNTHROID, LEVOTHROID) tablet 75 mcg  75 mcg Oral Q AM Vilma James MD   75 mcg at 04/06/21 0606   • Magnesium Sulfate 2 gram Bolus, followed by 8 gram infusion (total Mg dose 10 grams)- Mg less than or equal to 1mg/dL  2 g Intravenous PRN Hafsa English DO        Or   • Magnesium Sulfate 2 gram / 50mL Infusion (GIVE X 3 BAGS TO EQUAL 6GM TOTAL DOSE) - Mg 1.1 - 1.5 mg/dl  2 g Intravenous PRN Hafsa English  DO Jordyn        Or   • Magnesium Sulfate 4 gram infusion- Mg 1.6-1.9 mg/dL  4 g Intravenous PRN Hafsa English DO       • pantoprazole (PROTONIX) EC tablet 40 mg  40 mg Oral Q AM Vilma James MD   40 mg at 21 06   • potassium chloride (MICRO-K) CR capsule 40 mEq  40 mEq Oral PRN Hafsa English DO   40 mEq at 21    Or   • potassium chloride (KLOR-CON) packet 40 mEq  40 mEq Oral PRN Hafsa English DO        Or   • potassium chloride 10 mEq in 100 mL IVPB  10 mEq Intravenous Q1H PRN Hafsa English DO       • sertraline (ZOLOFT) tablet 50 mg  50 mg Oral Daily Vilma James MD   50 mg at 21 0951   • sodium chloride 0.9 % flush 10 mL  10 mL Intravenous PRN Yong Gonzalez MD       • sodium chloride 0.9 % flush 10 mL  10 mL Intravenous Q12H Darlene Mccallum APRN   10 mL at 21   • sodium chloride 0.9 % flush 10 mL  10 mL Intravenous PRN Darlene Mccallum APRN            Physician Progress Notes (last 24 hours) (Notes from 21 0812 through 21 0812)      Lori Barrett MD at 21 0910              [facility] Medicine Services  PROGRESS NOTE    Patient Name: Sebastian Winter  : 1940  MRN: [id number]    Date of Admission: 3/26/2021  Primary Care Physician: Yoav Bee MD    Subjective   Subjective     CC:  Follow up CVA    HPI:  Thinks he is doing okay.  Sat up in chair part of yesterday.  Has no complaints.      ROS:  Appetite okay.   No fevers  No chest pain or palpitations  No dyspnea  No n/v or diarrhea.     Objective   Objective     Vital Signs:   Temp:  [97.6 °F (36.4 °C)-98 °F (36.7 °C)] 97.7 °F (36.5 °C)  Heart Rate:  [50-76] 50  Resp:  [16-18] 16  BP: (133-152)/(66-88) 142/77  Total (NIH Stroke Scale): 4     Physical Exam:  Constitutional: No acute distress, awake and conversant.  Speech fairly clear today   HENT: NCAT, mucous membranes moist  Respiratory: Clear to  auscultation bilaterally, respiratory effort normal on RA  Cardiovascular: RRR, no murmurs, rubs, or gallops  Gastrointestinal: Positive bowel sounds, soft, nontender, nondistended  Musculoskeletal: No bilateral ankle edema  Psychiatric: nl mood and affect, calm and coop   Neurologic: R facial droop not noted today ; SALINAS symmetrically   Skin: warm, dry, no visible rash    Results Reviewed:  Results from last 7 days   Lab Units 04/03/21  0516   WBC 10*3/mm3 7.13   HEMOGLOBIN g/dL 13.3   HEMATOCRIT % 42.3   PLATELETS 10*3/mm3 182     Results from last 7 days   Lab Units 04/03/21  0856   SODIUM mmol/L 143   POTASSIUM mmol/L 3.7   CHLORIDE mmol/L 106   CO2 mmol/L 31.0*   BUN mg/dL 11   CREATININE mg/dL 1.05   GLUCOSE mg/dL 97   CALCIUM mg/dL 8.6     Estimated Creatinine Clearance: 47.6 mL/min (by C-G formula based on SCr of 1.05 mg/dL).    Microbiology Results Abnormal     Procedure Component Value - Date/Time    COVID PRE-OP / PRE-PROCEDURE SCREENING ORDER (NO ISOLATION) - Swab, Nasopharynx [[id number]]  (Normal) Collected: 03/26/21 1816    Lab Status: Final result Specimen: Swab from Nasopharynx Updated: 03/26/21 1852    Narrative:      The following orders were created for panel order COVID PRE-OP / PRE-PROCEDURE SCREENING ORDER (NO ISOLATION) - Swab, Nasopharynx.  Procedure                               Abnormality         Status                     ---------                               -----------         ------                     COVID-19 and FLU A/B PCR...[[id number]]  Normal              Final result                 Please view results for these tests on the individual orders.    COVID-19 and FLU A/B PCR - Swab, Nasopharynx [[id number]]  (Normal) Collected: 03/26/21 1816    Lab Status: Final result Specimen: Swab from Nasopharynx Updated: 03/26/21 1852     COVID19 Not Detected     Influenza A PCR Not Detected     Influenza B PCR Not Detected    Narrative:      Fact sheet for providers:  https://www.fda.gov/media/120898/download    Fact sheet for patients: https://www.fda.gov/media/139609/download    Test performed by PCR.          Imaging Results (Last 24 Hours)     ** No results found for the last 24 hours. **          Results for orders placed during the hospital encounter of 03/26/21    Adult Transthoracic Echo Complete W/ Cont if Necessary Per Protocol (With Agitated Saline)    Interpretation Summary  · Estimated left ventricular EF = 35% Left ventricular systolic function is moderately decreased. Inferoposterior hypokinesis  · Trace to mild mitral valve regurgitation is present.  · Trace tricuspid valve regurgitation is present.  · Saline test results are negative for intracardiac shunting.  · No obvious cardiac source for embolus is seen.      I have reviewed the medications:  Scheduled Meds:aspirin, 325 mg, Oral, Daily   Or  aspirin, 300 mg, Rectal, Daily  atorvastatin, 80 mg, Oral, Nightly  carvedilol, 3.125 mg, Oral, BID With Meals  clonazePAM, 1 mg, Oral, Nightly  clopidogrel, 75 mg, Oral, Daily  donepezil, 5 mg, Oral, Nightly  fludrocortisone, 100 mcg, Oral, Daily  levothyroxine, 75 mcg, Oral, Q AM  pantoprazole, 40 mg, Oral, Q AM  sertraline, 50 mg, Oral, Daily  sodium chloride, 10 mL, Intravenous, Q12H      Continuous Infusions:   PRN Meds:.•  acetaminophen  •  acetaminophen  •  clonazePAM  •  magnesium sulfate **OR** magnesium sulfate **OR** magnesium sulfate  •  potassium chloride **OR** potassium chloride **OR** potassium chloride  •  sodium chloride  •  sodium chloride    Assessment/Plan   Assessment & Plan     Active Hospital Problems    Diagnosis  POA   • **Dysarthria [R47.1]  Yes   • Cardiomyopathy (CMS/HCC) [I42.9]  Yes   • Essential hypertension [I10]  Yes   • Dyslipidemia [E78.5]  Yes   • Acute left-sided weakness [R53.1]  Yes   • Dementia without behavioral disturbance (CMS/HCC) [F03.90]  Yes   • Hypothyroidism (acquired) [E03.9]  Yes   • GERD without esophagitis [K21.9]   Yes      Resolved Hospital Problems   No resolved problems to display.        Brief Hospital Course to date:  Sebastian Winter is a 81 y.o. male with recent CVA treated at [facility] with no residual deficit per family, subsequently transitioned to long term care at [facility].   Sent to [facility] ED with acute L side weakness and dysarthria similar to his recent stroke as well as right sided facial droop.      This patient's problems and plans were partially entered by my partner and updated as appropriate by me 04/05/21.    Dysarthria and left sided weakness, acute  HX recent CVA  - imaging (CT head, CT perfusion, CTA head/neck, MRI brain) shows old lacunar infarct in the bud but no acute abnormality per radiology interpretation.  - unclear if this is exac of prev stroke vs new stroke; r/o cardioembolic.  Stroke team has signed off.    - P2Y12 103:  approp inhibition noted  - echo - EF 35, bubble study neg   -Continue ASA, plavix, atorvastatin  -cardiac event monitor at DC: [place] - discussed w dr loza      Diastolic Heart Failure  -EF 35% , grade I,   -cardiology consulted and started carvedilol , signed off  -had LHC at [facility], with some CAD but no LETICIA      Dementia  -Son-in-law reports he normally does well when he gets his nightly clonazepam  -continue clonazepam 1 mg at HS  -continue Aricept     Hypothyroidism  -Continue synthroid     GERD  -Continue PPI     Vitamin B12 deficiency  Vitamin D-deficiency  -On replacement at SNF         DVT Prophylaxis: lovenoxl         Disposition: I expect the patient to be discharged TBD, will need IRF     CODE STATUS:   Code Status and Medical Interventions:   Ordered at: 03/26/21 5528     Limited Support to NOT Include:    Antiarrhythmic Drugs    Cardioversion/Defibrillation    Intubation     Code Status:    No CPR     Medical Interventions (Level of Support Prior to Arrest):    Limited       Lori Barrett MD  04/05/21              Electronically signed by  Lori Barrett MD at 21 1155          Physical Therapy Notes (last 24 hours) (Notes from 21 0812 through 21 0812)      Yoana Gramajo PT at 21 1305  Version 1 of 1       Goal Outcome Evaluation:  Plan of Care Reviewed With: patient  Progress: no change  Outcome Summary: Pt required max assist x 2 for supine to sit. Pt able to stand with BUE assist with mod assist x 2 and perform bed to chair transfer with max assist x 2 and BUE support. Pt requires several cues to stay oriented to task with exercises. Will continue to progress patient as able. Continue to recommend SNF at discharge.    Electronically signed by Yoana Gramajo PT at 21 1412     Yoana Gramajo PT at 21 1305  Version 1 of 1         Patient Name: Sebastian Winter  : 1940    MRN: [id number]                              Today's Date: 2021       Admit Date: 3/26/2021    Visit Dx:     ICD-10-CM ICD-9-CM   1. Aphasia  R47.01 784.3   2. Acute left-sided weakness  R53.1 728.87   3. Oropharyngeal dysphagia  R13.12 787.22   4. Dysarthria  R47.1 784.51     Patient Active Problem List   Diagnosis   • Acute left-sided weakness   • Dysarthria   • Dementia without behavioral disturbance (CMS/HCC)   • Hypothyroidism (acquired)   • GERD without esophagitis   • Cardiomyopathy (CMS/HCC)   • Essential hypertension   • Dyslipidemia     Past Medical History:   Diagnosis Date   • Dementia (CMS/HCC)    • Depression    • GERD (gastroesophageal reflux disease)    • Hypothyroid      Past Surgical History:   Procedure Laterality Date   • JOINT REPLACEMENT       General Information     Row Name 21 9012          Physical Therapy Time and Intention    Document Type  therapy note (daily note)  -CS     Mode of Treatment  physical therapy  -CS     Row Name 21 8746          General Information    Patient Profile Reviewed  yes  -CS     Existing Precautions/Restrictions  fall  -CS     Row Name 21 7884           Cognition    Orientation Status (Cognition)  oriented to;person;disoriented to;place;situation;time;verbal cues/prompts needed for orientation;other (see comments) Pt unable to state hospital but when asked if patient was in hospital he stated yes. Pt also able to answer yes when asked if it was April  -CS     Row Name 04/05/21 0557          Safety Issues, Functional Mobility    Safety Issues Affecting Function (Mobility)  safety precautions follow-through/compliance;insight into deficits/self-awareness;ability to follow commands;at risk behavior observed;awareness of need for assistance;judgment;problem-solving;safety precaution awareness;sequencing abilities  -CS     Impairments Affecting Function (Mobility)  endurance/activity tolerance;strength;range of motion (ROM);postural/trunk control;balance;coordination;motor control;cognition  -CS       User Key  (r) = Recorded By, (t) = Taken By, (c) = Cosigned By    Initials Name Provider Type    CS Yoana Gramajo, PT Physical Therapist        Mobility     Row Name 04/05/21 2983          Bed Mobility    Bed Mobility  rolling right;supine-sit;scooting/bridging  -CS     Rolling Right [place] (Bed Mobility)  verbal cues;maximum assist (25% patient effort)  -CS     Scooting/Bridging [place] (Bed Mobility)  verbal cues;maximum assist (25% patient effort)  -CS     Supine-Sit [place] (Bed Mobility)  verbal cues;maximum assist (25% patient effort);2 person assist  -CS     Assistive Device (Bed Mobility)  bed rails;draw sheet;head of bed elevated  -CS     Comment (Bed Mobility)  VC's to roll to side and drop feet off of bed and push off of bed to raise trunk. Pt needed several cues on bed mobility. Once is sitting, patient had a strong lean to the R but with cues able to correct to neutral upright position and able to sit CGA.  -CS     Row Name 04/05/21 7626          Transfers    Comment (Transfers)  VC's on safe hand placement with transfers. Once in  standing, patient continuously lifted R foot off of floor and required cues to keep foot on floor, patient able to follow. Pt required cues on sequencing of steps from bed to chair, but had difficulty following without assist to weightshift.  -CS     Row Name 04/05/21 1305          Bed-Chair Transfer    Bed-Chair [place] (Transfers)  verbal cues;maximum assist (25% patient effort);2 person assist  -CS     Assistive Device (Bed-Chair Transfers)  other (see comments) B UE assist  -CS     Row Name 04/05/21 1305          Sit-Stand Transfer    Sit-Stand [place] (Transfers)  verbal cues;moderate assist (50% patient effort);2 person assist  -CS     Assistive Device (Sit-Stand Transfers)  other (see comments) B UE assist  -CS     Row Name 04/05/21 1305          Gait/Stairs (Locomotion)    [place] Level (Gait)  unable to assess  -CS     Comment (Gait/Stairs)  Pt unable to follow cues to ambulate further then bed to chair.  -       User Key  (r) = Recorded By, (t) = Taken By, (c) = Cosigned By    Initials Name Provider Type    CS Yoana Gramajo, PT Physical Therapist        Obj/Interventions     Row Name 04/05/21 1305          Motor Skills    Therapeutic Exercise  hip;knee;ankle;other (see comments) Pt required several cues to stay oriented to exercise task  -     Row Name 04/05/21 1305          Hip (Therapeutic Exercise)    Hip Strengthening (Therapeutic Exercise)  bilateral;aBduction;aDduction;5 repetitions;10 repetitions  -     Row Name 04/05/21 1305          Knee (Therapeutic Exercise)    Knee Isometrics (Therapeutic Exercise)  bilateral;quad sets;10 repetitions;5 repetitions  -     Knee Strengthening (Therapeutic Exercise)  bilateral;heel slides;marching while seated;SLR (straight leg raise);LAQ (long arc quad);5 repetitions;10 repetitions  -     Row Name 04/05/21 1305          Ankle (Therapeutic Exercise)    Ankle AROM (Therapeutic Exercise)  bilateral;dorsiflexion;plantarflexion;10  repetitions;5 repetitions  -     Row Name 04/05/21 1302          Balance    Balance Assessment  sitting static balance;standing static balance;standing dynamic balance  -     Static Sitting Balance  mild impairment;unsupported;sitting, edge of bed Max assist with strong R lean but able to correct to CGA with cues.  -CS     Static Standing Balance  moderate impairment;supported;standing  -CS     Dynamic Standing Balance  severe impairment;supported;standing  -CS       User Key  (r) = Recorded By, (t) = Taken By, (c) = Cosigned By    Initials Name Provider Type    Yoana Gunn PT Physical Therapist        Goals/Plan     [facility] Name 04/05/21 1308          Transfer Goal 1 (PT)    Time Frame (Transfer Goal 1, PT)  other (see comments) Goal met for STS but not for bed to chair transfer  -       User Key  (r) = Recorded By, (t) = Taken By, (c) = Cosigned By    Initials Name Provider Type    Yoana Gunn PT Physical Therapist        Clinical Impression     Row Name 04/05/21 130          Pain    Additional Documentation  Pain Scale: Numbers Pre/Post-Treatment (Group)  -[facility] Name 04/05/21 1303          Pain Scale: Numbers Pre/Post-Treatment    Pretreatment Pain Rating  0/10 - no pain  -CS     Posttreatment Pain Rating  0/10 - no pain  -CS     Pain Intervention(s)  Repositioned;Ambulation/increased activity  -     Row Name 04/05/21 1304          Plan of Care Review    Plan of Care Reviewed With  patient  -     Progress  no change  -     Outcome Summary  Pt required max assist x 2 for supine to sit. Pt able to stand with BUE assist with mod assist x 2 and perform bed to chair transfer with max assist x 2 and BUE support. Pt requires several cues to stay oriented to task with exercises. Will continue to progress patient as able. Continue to recommend SNF at discharge.  -     Row Name 04/05/21 1304          Therapy Assessment/Plan (PT)    Rehab Potential (PT)  fair, will monitor progress  closely  -CS     Criteria for Skilled Interventions Met (PT)  yes;meets criteria;skilled treatment is necessary  -CS     Row Name 04/05/21 1305          Vital Signs    Pre Systolic BP Rehab  140  -CS     Pre Treatment Diastolic BP  73  -CS     Post Systolic BP Rehab  163  -CS     Post Treatment Diastolic BP  95  -CS     Row Name 04/05/21 1305          Positioning and Restraints    Pre-Treatment Position  in bed  -CS     Post Treatment Position  chair  -CS     In Chair  notified nsg;reclined;call light within reach;encouraged to call for assist;exit alarm on;legs elevated;with OT  -CS       User Key  (r) = Recorded By, (t) = Taken By, (c) = Cosigned By    Initials Name Provider Type    CS Yoana Gramajo PT Physical Therapist        Outcome Measures     Row Name 04/05/21 1305          How much help from another person do you currently need...    Turning from your back to your side while in flat bed without using bedrails?  2  -CS     Moving from lying on back to sitting on the side of a flat bed without bedrails?  2  -CS     Moving to and from a bed to a chair (including a wheelchair)?  2  -CS     Standing up from a chair using your arms (e.g., wheelchair, bedside chair)?  2  -CS     Climbing 3-5 steps with a railing?  1  -CS     To walk in hospital room?  2  -CS     AM-PAC 6 Clicks Score (PT)  11  -CS     Row Name 04/05/21 1305          Modified Monroe Scale    Modified Syeda Scale  4 - Moderately severe disability.  Unable to walk without assistance, and unable to attend to own bodily needs without assistance.  -     Row Name 04/05/21 1305          Functional Assessment    Outcome Measure Options  AM-PAC 6 Clicks Basic Mobility (PT);Modified Monroe  -CS       User Key  (r) = Recorded By, (t) = Taken By, (c) = Cosigned By    Initials Name Provider Type    Yoana Gunn PT Physical Therapist        Physical Therapy Education                 Title: PT OT SLP Therapies (In Progress)     Topic:  Physical Therapy (Done)     Point: Mobility training (Done)     Learning Progress Summary           Patient Acceptance, E,D, VU,NR by  at 4/5/2021 1305    Comment: Educated patient on bed mobility sequencing, safe hand placement with transfers, bed to chair transfer sequencing, ther ex, and plan for progression of care.    Acceptance, E,D, NL,NR by  at 4/2/2021 1530    Acceptance, E, NR by AS at 3/29/2021 1038    Acceptance, E, NR by SC at 3/28/2021 1010    Comment: REVIEWED BENEFITS OF ACTIVITY                   Point: Home exercise program (Done)     Learning Progress Summary           Patient Acceptance, E,D, VU,NR by CS at 4/5/2021 1305    Comment: Educated patient on bed mobility sequencing, safe hand placement with transfers, bed to chair transfer sequencing, ther ex, and plan for progression of care.    Acceptance, E,D, NL,NR by  at 4/2/2021 1530    Acceptance, E, NR by AS at 3/29/2021 1038    Acceptance, E, NR by SC at 3/28/2021 1010    Comment: REVIEWED BENEFITS OF ACTIVITY                   Point: Body mechanics (Done)     Learning Progress Summary           Patient Acceptance, E,D, VU,NR by  at 4/5/2021 1305    Comment: Educated patient on bed mobility sequencing, safe hand placement with transfers, bed to chair transfer sequencing, ther ex, and plan for progression of care.    Acceptance, E,D, NL,NR by  at 4/2/2021 1530    Acceptance, E, NR by AS at 3/29/2021 1038    Acceptance, E, NR by SC at 3/28/2021 1010    Comment: REVIEWED BENEFITS OF ACTIVITY                   Point: Precautions (Done)     Learning Progress Summary           Patient Acceptance, E,D, VU,NR by  at 4/5/2021 1305    Comment: Educated patient on bed mobility sequencing, safe hand placement with transfers, bed to chair transfer sequencing, ther ex, and plan for progression of care.    Acceptance, E,D, NL,NR by  at 4/2/2021 1530    Acceptance, E, NR by AS at 3/29/2021 1038    Acceptance, E, NR by SC at 3/28/2021 1010     Comment: REVIEWED BENEFITS OF ACTIVITY                               User Key     Initials Effective Dates Name Provider Type Discipline    SC 06/19/15 -  Bryce Bran, PT Physical Therapist PT    AS 06/22/15 -  Annette Ramirez, PTA Physical Therapy Assistant PT    LF 06/08/18 -  Nava Wilburn, PT Physical Therapist PT     03/26/19 -  Yoana Gramajo PT Physical Therapist PT              PT Recommendation and Plan  Planned Therapy Interventions (PT): balance training, bed mobility training, gait training, home exercise program, neuromuscular re-education, patient/family education, strengthening, transfer training  Plan of Care Reviewed With: patient  Progress: no change  Outcome Summary: Pt required max assist x 2 for supine to sit. Pt able to stand with BUE assist with mod assist x 2 and perform bed to chair transfer with max assist x 2 and BUE support. Pt requires several cues to stay oriented to task with exercises. Will continue to progress patient as able. Continue to recommend SNF at discharge.     Time Calculation:   PT Charges     Row Name 04/05/21 1305             Time Calculation    Start Time  1305  -      PT Received On  04/05/21  -CS         Time Calculation- PT    Total Timed Code Minutes- PT  18 minute(s)  -CS         Timed Charges    75500 - PT Therapeutic Exercise Minutes  6  -CS      30013 - PT Therapeutic Activity Minutes  12  -CS        User Key  (r) = Recorded By, (t) = Taken By, (c) = Cosigned By    Initials Name Provider Type    CS Yoana Gramajo, FATOU Physical Therapist        Therapy Charges for Today     Code Description Service Date Service Provider Modifiers Qty    85406701273 HC PT THERAPEUTIC ACT EA 15 MIN 4/5/2021 Yoana Gramajo, PT GP 1          PT G-Codes  Outcome Measure Options: AM-PAC 6 Clicks Basic Mobility (PT), Modified Syeda  AM-PAC 6 Clicks Score (PT): 11  AM-PAC 6 Clicks Score (OT): 8  Modified Syeda Scale: 4 - Moderately severe disability.   Unable to walk without assistance, and unable to attend to own bodily needs without assistance.    Yoana Gramajo, PT  2021      Electronically signed by Yoana Gramajo, PT at 21 1413          Occupational Therapy Notes (last 24 hours) (Notes from 21 0812 through 21 0812)      Romelia Nguyen, OT at 21 1305          Patient Name: Sebastian Winter  : 1940    MRN: [id number]                              Today's Date: 2021       Admit Date: 3/26/2021    Visit Dx:     ICD-10-CM ICD-9-CM   1. Aphasia  R47.01 784.3   2. Acute left-sided weakness  R53.1 728.87   3. Oropharyngeal dysphagia  R13.12 787.22   4. Dysarthria  R47.1 784.51     Patient Active Problem List   Diagnosis   • Acute left-sided weakness   • Dysarthria   • Dementia without behavioral disturbance (CMS/HCC)   • Hypothyroidism (acquired)   • GERD without esophagitis   • Cardiomyopathy (CMS/HCC)   • Essential hypertension   • Dyslipidemia     Past Medical History:   Diagnosis Date   • Dementia (CMS/HCC)    • Depression    • GERD (gastroesophageal reflux disease)    • Hypothyroid      Past Surgical History:   Procedure Laterality Date   • JOINT REPLACEMENT       General Information     Row Name 21 1434          OT Time and Intention    Document Type  therapy note (daily note)  -     Mode of Treatment  occupational therapy  -     Row Name 21 1434          General Information    Existing Precautions/Restrictions  fall  -HK     Barriers to Rehab  cognitive status;medically complex;visual deficit  -     Row Name 21 1434          Cognition    Orientation Status (Cognition)  oriented to;person;disoriented to;place;situation;time  -     Row Name 21 1434          Safety Issues, Functional Mobility    Safety Issues Affecting Function (Mobility)  safety precautions follow-through/compliance;safety precaution awareness;insight into deficits/self-awareness;judgment;problem-solving;ability to  follow commands;sequencing abilities  -     Impairments Affecting Function (Mobility)  endurance/activity tolerance;strength;range of motion (ROM);postural/trunk control;balance;coordination;motor control;cognition;grasp  -HK     Cognitive Impairments, Mobility Safety/Performance  attention;awareness, need for assistance;insight into deficits/self-awareness;judgment;problem-solving/reasoning;safety precaution awareness;sequencing abilities;safety precaution follow-through  -       User Key  (r) = Recorded By, (t) = Taken By, (c) = Cosigned By    Initials Name Provider Type     Romelia Nguyen, OT Occupational Therapist          Mobility/ADL's     Row Name 04/05/21 1437          Bed Mobility    Bed Mobility  rolling right;supine-sit;scooting/bridging  -     Rolling Right [place] (Bed Mobility)  verbal cues;maximum assist (25% patient effort)  -     Scooting/Bridging [place] (Bed Mobility)  verbal cues;maximum assist (25% patient effort)  -HK     Supine-Sit [place] (Bed Mobility)  verbal cues;maximum assist (25% patient effort);2 person assist  -HK     Bed Mobility, Safety Issues  cognitive deficits limit understanding;decreased use of arms for pushing/pulling;decreased use of legs for bridging/pushing;impaired trunk control for bed mobility  -     Assistive Device (Bed Mobility)  bed rails;draw sheet;head of bed elevated  -     Comment (Bed Mobility)  Pt requries increased processing time for all commands. maxA for all bed mobility.  -     Row Name 04/05/21 1437          Transfers    Transfers  sit-stand transfer;bed-chair transfer  -     Comment (Transfers)  Verbal cues for safe hand placement and sequencing. Limtied by posterior lean in standing.  -HK     Bed-Chair [place] (Transfers)  verbal cues;maximum assist (25% patient effort);2 person assist  -     Assistive Device (Bed-Chair Transfers)  other (see comments) B UE support  -     Sit-Stand [place] (Transfers)   verbal cues;moderate assist (50% patient effort);2 person assist  -[facility] Name 04/05/21 1437          Sit-Stand Transfer    Assistive Device (Sit-Stand Transfers)  other (see comments) B UE support  -[facility] Name 04/05/21 1437          Functional Mobility    Functional Mobility- Ind. Level  not appropriate to assess  -[facility] Name 04/05/21 1437          Activities of Daily Living    BADL Assessment/Intervention  feeding  -[facility] Name 04/05/21 1437          Self-Feeding Assessment/Training    Collins Level (Feeding)  minimum assist (75% patient effort);liquids to mouth;scoop food and bring to mouth  -HK     Position (Self-Feeding)  supported sitting  -HK     Comment (Feeding)  OT issued T handle cup and scoop plate. Pt able to complete all self feeding with use of large  utensils, scoop plate, and T handle cup. Francis as well as verbal cues this date for feeding.  -[facility] Name 04/05/21 1437          Toileting Assessment/Training    Collins Level (Toileting)  dependent (less than 25% patient effort)  -HK     Position (Toileting)  supine  -HK     Comment (Toileting)  Pt with noted bowel incontinence during bed mobility. Dependent for all chantell care.  -       User Key  (r) = Recorded By, (t) = Taken By, (c) = Cosigned By    Initials Name Provider Type     Romelia Nguyen, OT Occupational Therapist        Obj/Interventions     [facility] Name 04/05/21 1441          Sensory Assessment (Somatosensory)    Sensory Assessment (Somatosensory)  sensation intact  -[facility] Name 04/05/21 1441          Balance    Balance Assessment  sitting static balance;sitting dynamic balance;standing static balance  -     Static Sitting Balance  mild impairment;unsupported;sitting, edge of bed  -     Dynamic Sitting Balance  moderate impairment;unsupported;sitting, edge of bed  -     Static Standing Balance  moderate impairment;supported;standing  -HK     Dynamic Standing Balance  severe  impairment;supported;standing  -HK     Balance Interventions  sitting;supported;dynamic;dynamic reaching;occupation based/functional task  -       User Key  (r) = Recorded By, (t) = Taken By, (c) = Cosigned By    Initials Name Provider Type     Romelia Nguyen, OT Occupational Therapist        Goals/Plan    No documentation.       Clinical Impression     Row Name 04/05/21 1442          Pain Assessment    Additional Documentation  Pain Scale: Numbers Pre/Post-Treatment (Group)  -     Row Name 04/05/21 1442          Pain Scale: Numbers Pre/Post-Treatment    Pretreatment Pain Rating  0/10 - no pain  -HK     Posttreatment Pain Rating  0/10 - no pain  -HK     Pain Intervention(s)  Ambulation/increased activity;Repositioned  -     Row Name 04/05/21 1442          Plan of Care Review    Plan of Care Reviewed With  patient  -HK     Progress  improving  -     Outcome Summary  Pt requires maxA to roll in bed for chantell care and dependence for completion of chantell care. Pt maxAx2 to advance to EOB and required maxAx2 for bed to chair transfer. Pt sat supported in chair to complete self feeding. OT issued scoop plate and T handle cup in addition to large  utensils. Pt requires extra time/effort and Francis for all self feeding. Continue IP OT per POC.  -     Row Name 04/05/21 1442          Vital Signs    Pre Systolic BP Rehab  140  -HK     Pre Treatment Diastolic BP  73  -HK     Post Systolic BP Rehab  16  -HK     Post Treatment Diastolic BP  95  -HK     O2 Delivery Pre Treatment  room air  -HK     O2 Delivery Intra Treatment  room air  -HK     O2 Delivery Post Treatment  room air  -HK     Pre Patient Position  Supine  -HK     Intra Patient Position  Standing  -HK     Post Patient Position  Sitting  -HK     Row Name 04/05/21 1442          Positioning and Restraints    Pre-Treatment Position  in bed  -HK     Post Treatment Position  chair  -HK     In Chair  notified nsg;reclined;call light within reach;encouraged to call  for assist;exit alarm on  -HK       User Key  (r) = Recorded By, (t) = Taken By, (c) = Cosigned By    Initials Name Provider Type    Romelia Antonio OT Occupational Therapist        Outcome Measures     Row Name 04/05/21 1446          How much help from another is currently needed...    Putting on and taking off regular lower body clothing?  1  -HK     Bathing (including washing, rinsing, and drying)  1  -HK     Toileting (which includes using toilet bed pan or urinal)  1  -HK     Putting on and taking off regular upper body clothing  2  -HK     Taking care of personal grooming (such as brushing teeth)  2  -HK     Eating meals  3  -HK     AM-PAC 6 Clicks Score (OT)  10  -     Row Name 04/05/21 1446          Modified Plano Scale    Modified [place]ano Scale  4 - Moderately severe disability.  Unable to walk without assistance, and unable to attend to own bodily needs without assistance.  -     Row Name 04/05/21 1446          Functional Assessment    Outcome Measure Options  AM-PAC 6 Clicks Daily Activity (OT)  -       User Key  (r) = Recorded By, (t) = Taken By, (c) = Cosigned By    Initials Name Provider Type    Romelia Antonio, OT Occupational Therapist        Occupational Therapy Education                 Title: PT OT SLP Therapies (In Progress)     Topic: Occupational Therapy (In Progress)     Point: ADL training (Done)     Description:   Instruct learner(s) on proper safety adaptation and remediation techniques during self care or transfers.   Instruct in proper use of assistive devices.              Learning Progress Summary           Patient Acceptance, E,TB,D, VU,NR by  at 4/5/2021 1447    Acceptance, E,TB,D,H, VU,NR by  at 3/30/2021 1546    Nonacceptance, E, NL by AN at 3/28/2021 0814    Comment: Educated pt on OT role and POC.                   Point: Home exercise program (Not Started)     Description:   Instruct learner(s) on appropriate technique for monitoring, assisting and/or progressing  therapeutic exercises/activities.              Learner Progress:  Not documented in this visit.          Point: Precautions (Done)     Description:   Instruct learner(s) on prescribed precautions during self-care and functional transfers.              Learning Progress Summary           Patient Acceptance, E,TB,D, VU,NR by  at 4/5/2021 1447    Acceptance, E,TB,D,H, VU,NR by  at 3/30/2021 1546                   Point: Body mechanics (Done)     Description:   Instruct learner(s) on proper positioning and spine alignment during self-care, functional mobility activities and/or exercises.              Learning Progress Summary           Patient Acceptance, E,TB,D, VU,NR by HK at 4/5/2021 1447    Acceptance, E,TB,D,H, VU,NR by  at 3/30/2021 1546                               User Key     Initials Effective Dates Name Provider Type Discipline     06/22/15 -  Rhoda Coker, OT Occupational Therapist OT     03/07/18 -  Romelia Nguyen, OT Occupational Therapist OT              OT Recommendation and Plan     Plan of Care Review  Plan of Care Reviewed With: patient  Progress: improving  Outcome Summary: Pt requires maxA to roll in bed for chantell care and dependence for completion of chantell care. Pt maxAx2 to advance to EOB and required maxAx2 for bed to chair transfer. Pt sat supported in chair to complete self feeding. OT issued scoop plate and T handle cup in addition to large  utensils. Pt requires extra time/effort and Fracnis for all self feeding. Continue IP OT per POC.     Time Calculation:   Time Calculation- OT     Row Name 04/05/21 1305             Time Calculation- OT    OT Start Time  1305  -      OT Received On  04/05/21  -         Timed Charges    39532 - OT Self Care/Mgmt Minutes  30  -        User Key  (r) = Recorded By, (t) = Taken By, (c) = Cosigned By    Initials Name Provider Type     Romelia Nguyen, OT Occupational Therapist        Therapy Charges for Today     Code Description Service Date  Service Provider Modifiers Qty    [id number]  OT SELF CARE/MGMT/TRAIN EA 15 MIN 4/5/2021 Romelia Nguyen OT GO 2               Romelia Nguyen OT  4/5/2021    Electronically signed by Romelia Nguyen OT at 04/05/21 4879     Romelia Nguyen OT at 04/05/21 1305        Goal Outcome Evaluation:  Plan of Care Reviewed With: patient  Progress: improving  Outcome Summary: Pt requires maxA to roll in bed for chantell care and dependence for completion of chantell care. Pt maxAx2 to advance to EOB and required maxAx2 for bed to chair transfer. Pt sat supported in chair to complete self feeding. OT issued scoop plate and T handle cup in addition to large  utensils. Pt requires extra time/effort and Francis for all self feeding. Continue IP OT per POC.    Electronically signed by Romelia Nguyen OT at 04/05/21 6126

## 2021-04-06 NOTE — PROGRESS NOTES
Continued Stay Note  [facility]     Patient Name: Sebastian Winter  MRN: [id number]  Today's Date: 4/6/2021    Admit Date: 3/26/2021    Discharge Plan     Row Name 04/06/21 0921       Plan    Plan  SNF    Patient/Family in Agreement with Plan  yes    Plan Comments  Spoke with Jozef (daughter) by phone. Jozef wants to try SNF's in [place] and [place], [place]. Referrals faxed to [facility] and [facility]. Jayne at [facility] declined the patient due to patient being up with max assist. CM will continue to follow.    Final Discharge Disposition Code  03 - skilled nursing facility (SNF)    Row Name 04/06/21 0853       Plan    Plan  [facility]    Patient/Family in Agreement with Plan  yes    Plan Comments  Spoke with Jayne by phone. Jayne requested updated PT/OT notes. CM faxed them to her. Jayne and the DON from [facility] are coming today to assess the patient. CM will continue to follow.    Final Discharge Disposition Code  04 - intermediate care facility        Discharge Codes    No documentation.             Hipolito Thompson, RN

## 2021-04-07 LAB
ANION GAP SERPL CALCULATED.3IONS-SCNC: 9 MMOL/L (ref 5–15)
BUN SERPL-MCNC: 13 MG/DL (ref 8–23)
BUN/CREAT SERPL: 11.8 (ref 7–25)
CALCIUM SPEC-SCNC: 8.9 MG/DL (ref 8.6–10.5)
CHLORIDE SERPL-SCNC: 104 MMOL/L (ref 98–107)
CO2 SERPL-SCNC: 31 MMOL/L (ref 22–29)
CREAT SERPL-MCNC: 1.1 MG/DL (ref 0.76–1.27)
GFR SERPL CREATININE-BSD FRML MDRD: 64 ML/MIN/1.73
GLUCOSE SERPL-MCNC: 87 MG/DL (ref 65–99)
POTASSIUM SERPL-SCNC: 3.5 MMOL/L (ref 3.5–5.2)
SARS-COV-2 RDRP RESP QL NAA+PROBE: NORMAL
SODIUM SERPL-SCNC: 144 MMOL/L (ref 136–145)

## 2021-04-07 PROCEDURE — 97116 GAIT TRAINING THERAPY: CPT

## 2021-04-07 PROCEDURE — 25010000002 ENOXAPARIN PER 10 MG: Performed by: INTERNAL MEDICINE

## 2021-04-07 PROCEDURE — 92526 ORAL FUNCTION THERAPY: CPT

## 2021-04-07 PROCEDURE — 99232 SBSQ HOSP IP/OBS MODERATE 35: CPT | Performed by: INTERNAL MEDICINE

## 2021-04-07 PROCEDURE — 86769 SARS-COV-2 COVID-19 ANTIBODY: CPT | Performed by: INTERNAL MEDICINE

## 2021-04-07 PROCEDURE — 97110 THERAPEUTIC EXERCISES: CPT

## 2021-04-07 PROCEDURE — 80048 BASIC METABOLIC PNL TOTAL CA: CPT | Performed by: INTERNAL MEDICINE

## 2021-04-07 PROCEDURE — 97535 SELF CARE MNGMENT TRAINING: CPT

## 2021-04-07 PROCEDURE — 87635 SARS-COV-2 COVID-19 AMP PRB: CPT | Performed by: INTERNAL MEDICINE

## 2021-04-07 PROCEDURE — 92507 TX SP LANG VOICE COMM INDIV: CPT

## 2021-04-07 RX ADMIN — CARVEDILOL 3.12 MG: 3.12 TABLET, FILM COATED ORAL at 08:31

## 2021-04-07 RX ADMIN — DONEPEZIL HYDROCHLORIDE 5 MG: 10 TABLET, FILM COATED ORAL at 21:38

## 2021-04-07 RX ADMIN — ENOXAPARIN SODIUM 40 MG: 40 INJECTION SUBCUTANEOUS at 08:31

## 2021-04-07 RX ADMIN — SERTRALINE HYDROCHLORIDE 50 MG: 50 TABLET ORAL at 08:31

## 2021-04-07 RX ADMIN — ASPIRIN 325 MG ORAL TABLET 325 MG: 325 PILL ORAL at 08:31

## 2021-04-07 RX ADMIN — CARVEDILOL 3.12 MG: 3.12 TABLET, FILM COATED ORAL at 18:23

## 2021-04-07 RX ADMIN — ATORVASTATIN CALCIUM 80 MG: 40 TABLET, FILM COATED ORAL at 21:38

## 2021-04-07 RX ADMIN — CLONAZEPAM 1 MG: 1 TABLET ORAL at 21:38

## 2021-04-07 RX ADMIN — CLOPIDOGREL BISULFATE 75 MG: 75 TABLET ORAL at 08:31

## 2021-04-07 RX ADMIN — PANTOPRAZOLE SODIUM 40 MG: 40 TABLET, DELAYED RELEASE ORAL at 06:12

## 2021-04-07 RX ADMIN — FLUDROCORTISONE ACETATE 100 MCG: 0.1 TABLET ORAL at 08:31

## 2021-04-07 RX ADMIN — LEVOTHYROXINE SODIUM 75 MCG: 75 TABLET ORAL at 06:12

## 2021-04-07 NOTE — PLAN OF CARE
Goal Outcome Evaluation:  Plan of Care Reviewed With: patient     Outcome Summary: OT session focus on feeding this date. Pt requires set up with cup to drink. Pt mod assist overall with feeding skills. Pt required assist with spoon handling, he often hits just below his mouth. Pt needed mod to max assist to place food on spoon. Continue OT and recommend SNF.

## 2021-04-07 NOTE — PROGRESS NOTES
Continued Stay Note  [facility]     Patient Name: Sebastian Winter  MRN: [id number]  Today's Date: 4/7/2021    Admit Date: 3/26/2021    Discharge Plan     Row Name 04/07/21 1111       Plan    Plan  [facility]    Patient/Family in Agreement with Plan  yes    Plan Comments  Plan is [facility] tomorrow. Tosin is scheduledfor u. 4/8 at 1300. Spoke with Jozef and notified her of the plan and agreeable with plan. CM will continue to follow.    Final Discharge Disposition Code  03 - skilled nursing facility (SNF)    Row Name 04/07/21 0922       Plan    Plan  [facility]    Patient/Family in Agreement with Plan  yes    Plan Comments  Spoke with [facility] by phone. They can accept the patient anytime as long as he has a negative COVID test. CM is still waiting for transportation to be arranged for later today or tomorrow. CM will continue to follow.    Final Discharge Disposition Code  03 - skilled nursing facility (SNF)        Discharge Codes    No documentation.       Expected Discharge Date and Time     Expected Discharge Date Expected Discharge Time    Apr 8, 2021             Hipolito Thompson RN

## 2021-04-07 NOTE — DISCHARGE SUMMARY
[facility] Medicine Services  DISCHARGE SUMMARY    Patient Name: Sebastian Winter  : 1940  MRN: [id number]    Date of Admission: 3/26/2021  2:42 PM  Date of Discharge:  2021  Primary Care Physician: Yoav Bee MD    Consults     Date and Time Order Name Status Description    3/29/2021 12:16 PM Inpatient Cardiology Consult Completed     3/29/2021 10:02 AM Inpatient Cardiology Consult Completed     3/26/2021  2:42 PM Inpatient Neurology Consult Stroke Completed           Hospital Course     Presenting Problem:   Acute left-sided weakness [R53.1]    Active Hospital Problems    Diagnosis  POA   • **Dysarthria [R47.1]  Yes   • Cardiomyopathy (CMS/HCC) [I42.9]  Yes   • Essential hypertension [I10]  Yes   • Dyslipidemia [E78.5]  Yes   • Acute left-sided weakness [R53.1]  Yes   • Dementia without behavioral disturbance (CMS/HCC) [F03.90]  Yes   • Hypothyroidism (acquired) [E03.9]  Yes   • GERD without esophagitis [K21.9]  Yes      Resolved Hospital Problems   No resolved problems to display.          Hospital Course:  Sebastian Winter is a 81 y.o. male with recent CVA treated at [facility] with no residual deficit per family, subsequently transitioned to long term care at [facility].   Sent to [facility] ED with acute L side weakness and dysarthria similar to his recent stroke as well as right sided facial droop.      Dysarthria and left sided weakness, acute  HX recent CVA  - imaging (CT head, CT perfusion, CTA head/neck, MRI brain) showed old lacunar infarct in the bud but no acute abnormality per radiology interpretation.  - unclear if this is exac of prev stroke vs new stroke; r/o cardioembolic.  Stroke team has signed off.    - P2Y12 103:  approp inhibition noted  - echo - EF 35, bubble study neg   -Continue ASA, plavix, atorvastatin  -cardiac event monitor at DC: Alex - discussed w dr loza   -He will follow-up with neurology Dr. Moore in 4 weeks     Diastolic  "Heart Failure  -EF 35% , grade I,   -cardiology consulted and started carvedilol , signed off  -had LHC at [facility], with some CAD but no LETICIA   -We will follow up with Dr. Doyle in 4 to 6 weeks, will have a telehealth visit with the heart valve Merrill in 1 week     Dementia  -Son-in-law reports he normally does well when he gets his nightly clonazepam  -continue clonazepam 1 mg at HS and 0.5mg PRN  -continue Aricept     Hypothyroidism  -Continue synthroid     GERD  -Continue PPI     Vitamin B12 deficiency  Vitamin D-deficiency  -On replacement at SNF       Discharge Follow Up Recommendations for outpatient labs/diagnostics:  Follow-up with cardiology, Dr. Doyle in 4 weeks  Follow-up with neurology in 4 weeks    Day of Discharge     HPI: No acute events overnight, patient said that he slept okay excited about \"going home today\"    Review of Systems  Gen- No fevers, chills  CV- No chest pain, palpitations  Resp- No cough, dyspnea  GI- No N/V/D, abd pain    Vital Signs:   Temp:  [96.3 °F (35.7 °C)-97.9 °F (36.6 °C)] 97.6 °F (36.4 °C)  Heart Rate:  [53-59] 58  Resp:  [16-18] 16  BP: (129-152)/(66-91) 143/78     Physical Exam:  Constitutional: Currently ill-appearing elderly male, in no acute distress, awake, alert  HENT: NCAT, mucous membranes moist  Respiratory: Clear to auscultation bilaterally, respiratory effort normal   Cardiovascular: RRR, no murmurs, rubs, or gallops  Gastrointestinal: Positive bowel sounds, soft, nontender, nondistended  Musculoskeletal: No bilateral ankle edema  Psychiatric: Appropriate affect, cooperative  Neurologic: Slurred speech, mild left sided weakness  Skin: No rashes    Pertinent  and/or Most Recent Results     LAB RESULTS:      Lab 04/03/21  0516   WBC 7.13   HEMOGLOBIN 13.3   HEMATOCRIT 42.3   PLATELETS 182   MCV 91.6         Lab 04/07/21  0550 04/03/21  0856   SODIUM 144 143   POTASSIUM 3.5 3.7   CHLORIDE 104 106   CO2 31.0* 31.0*   ANION GAP 9.0 6.0   BUN 13 11   CREATININE " 1.10 1.05   GLUCOSE 87 97   CALCIUM 8.9 8.6   MAGNESIUM  --  2.0                         Brief Urine Lab Results  (Last result in the past 365 days)      Color   Clarity   Blood   Leuk Est   Nitrite   Protein   CREAT   Urine HCG        03/27/21 1259 Yellow Clear Small (1+) Negative Negative Trace             Microbiology Results (last 10 days)     Procedure Component Value - Date/Time    COVID PRE-OP / PRE-PROCEDURE SCREENING ORDER (NO ISOLATION) - Swab, Nasopharynx [[id number]]  (Normal) Collected: 04/07/21 2139    Lab Status: Final result Specimen: Swab from Nasopharynx Updated: 04/07/21 2335    Narrative:      The following orders were created for panel order COVID PRE-OP / PRE-PROCEDURE SCREENING ORDER (NO ISOLATION) - Swab, Nasopharynx.  Procedure                               Abnormality         Status                     ---------                               -----------         ------                     COVID-19, [facility] IN-HOUS...[[id number]]  Normal              Final result                 Please view results for these tests on the individual orders.    COVID-19, [facility] IN-HOUSE,NASAL Swab (NO TRANSPORT MEDIA) 2 HR TAT - Swab, Nasopharynx [[id number]]  (Normal) Collected: 04/07/21 2139    Lab Status: Final result Specimen: Swab from Nasopharynx Updated: 04/07/21 2335     COVID19 Presumptive Negative    Narrative:      Fact sheet for providers: https://www.fda.gov/media/368773/download     Fact sheet for patients: https://www.fda.gov/media/233901/download    Test performed by PCR.  If inconsistent with clinical signs and symptoms patient should be tested with different authorized molecular test.          FL Video Swallow With Speech Single Contrast    Result Date: 3/30/2021  EXAMINATION: FL VIDEO SWALLOW W SPEECH SINGLE-CONTRAST-  INDICATION: DYSPHAGIA, OROPHARYNGEAL, HAS ATTRIBUTABLE CAUSE  TECHNIQUE: 2 minutes and 42 seconds of fluoroscopic time was used for this exam. 10 associated fluoroscopic loops were  saved. The patient was evaluated in the seated lateral position while taking a variety of consistencies of barium by mouth under the direction of speech pathology.  COMPARISON: NONE  FINDINGS: 2 minutes and 42 seconds of fluoroscopy provided for a modified barium swallow. Please see speech therapy report for full details and recommendations.       Fluoroscopy provided for a modified barium swallow. Please see speech therapy report for full details and recommendations.    This report was finalized on 3/30/2021 6:53 PM by Dr. Armand Rangel.                Results for orders placed during the hospital encounter of 03/26/21    Adult Transthoracic Echo Complete W/ Cont if Necessary Per Protocol (With Agitated Saline)    Interpretation Summary  · Estimated left ventricular EF = 35% Left ventricular systolic function is moderately decreased. Inferoposterior hypokinesis  · Trace to mild mitral valve regurgitation is present.  · Trace tricuspid valve regurgitation is present.  · Saline test results are negative for intracardiac shunting.  · No obvious cardiac source for embolus is seen.      Plan for Follow-up of Pending Labs/Results:   Pending Labs     Order Current Status    SARS-CoV-2 Antibody, IgG ([facility]) In process        Discharge Details        Discharge Medications      New Medications      Instructions Start Date   aspirin 325 MG tablet  Replaces: aspirin 81 MG EC tablet   325 mg, Oral, Daily      carvedilol 3.125 MG tablet  Commonly known as: COREG   3.125 mg, Oral, 2 Times Daily With Meals      donepezil 5 MG tablet  Commonly known as: ARICEPT   5 mg, Oral, Nightly         Changes to Medications      Instructions Start Date   clonazePAM 0.5 MG tablet  Commonly known as: KlonoPIN  What changed: reasons to take this   0.5 mg, Oral, 2 Times Daily PRN      clonazePAM 1 MG tablet  Commonly known as: KlonoPIN  What changed:   · when to take this  · reasons to take this   1 mg, Oral, Nightly         Continue These  Medications      Instructions Start Date   acetaminophen 325 MG tablet  Commonly known as: TYLENOL   650 mg, Oral, Every 6 Hours PRN      Aplisol 5 UNIT/0.1ML injection  Generic drug: tuberculin   Intradermal, Once      atorvastatin 80 MG tablet  Commonly known as: LIPITOR   80 mg, Oral, Daily      bisacodyl 5 MG EC tablet  Commonly known as: DULCOLAX   10 mg, Oral, Daily PRN      cholecalciferol 25 MCG (1000 UT) tablet  Commonly known as: VITAMIN D3   1,000 Units, Oral, Daily      clopidogrel 75 MG tablet  Commonly known as: PLAVIX   75 mg, Oral, Daily      fludrocortisone 0.1 MG tablet   Oral, Daily      levothyroxine 75 MCG tablet  Commonly known as: SYNTHROID, LEVOTHROID   75 mcg, Oral, Daily      magnesium hydroxide 400 MG/5ML suspension  Commonly known as: MILK OF MAGNESIA   Oral, Daily PRN      pantoprazole 40 MG EC tablet  Commonly known as: PROTONIX   40 mg, Oral, Daily      sertraline 50 MG tablet  Commonly known as: ZOLOFT   50 mg, Oral, Daily      vitamin B-12 1000 MCG tablet  Commonly known as: CYANOCOBALAMIN   1,000 mcg, Oral, Daily         Stop These Medications    aspirin 81 MG EC tablet  Replaced by: aspirin 325 MG tablet            No Known Allergies      Discharge Disposition: [facility] ([place] [facility])    Diet:  Hospital:  Diet Order   Procedures   • Diet Dysphagia; II - Pureed; Nectar / Syrup Thick; Other, No Straws; small sips; Cardiac       Activity: As tolerated      Restrictions or Other Recommendations:  None       CODE STATUS:    Code Status and Medical Interventions:   Ordered at: 03/26/21 1744     Limited Support to NOT Include:    Antiarrhythmic Drugs    Cardioversion/Defibrillation    Intubation     Code Status:    No CPR     Medical Interventions (Level of Support Prior to Arrest):    Limited       No future appointments.      Kirt Lowe MD  04/08/21      Time Spent on Discharge:  I spent  35 minutes on this discharge activity which included:  face-to-face encounter with the patient, reviewing the data in the system, coordination of the care with the nursing staff as well as consultants, documentation, and entering orders.

## 2021-04-07 NOTE — PLAN OF CARE
Goal Outcome Evaluation:  Plan of Care Reviewed With: (P) patient  Progress: (P) improving    SLP treatment completed. Will continue to address dysphagia, speech, language, and cognition goals. Please see note for further details and recommendations.

## 2021-04-07 NOTE — THERAPY TREATMENT NOTE
Patient Name: Sebastian Winter  : 1940    MRN: [id number]                              Today's Date: 2021       Admit Date: 3/26/2021    Visit Dx:     ICD-10-CM ICD-9-CM   1. Oropharyngeal dysphagia  R13.12 787.22   2. Acute left-sided weakness  R53.1 728.87   3. Aphasia  R47.01 784.3   4. Dysarthria  R47.1 784.51     Patient Active Problem List   Diagnosis   • Acute left-sided weakness   • Dysarthria   • Dementia without behavioral disturbance (CMS/HCC)   • Hypothyroidism (acquired)   • GERD without esophagitis   • Cardiomyopathy (CMS/HCC)   • Essential hypertension   • Dyslipidemia     Past Medical History:   Diagnosis Date   • Dementia (CMS/HCC)    • Depression    • GERD (gastroesophageal reflux disease)    • Hypothyroid      Past Surgical History:   Procedure Laterality Date   • JOINT REPLACEMENT       General Information     Row Name 21 4804          Physical Therapy Time and Intention    Document Type  therapy note (daily note)  -CD     Mode of Treatment  physical therapy  -CD     Row Name 21 1346          General Information    Patient Profile Reviewed  yes  -CD     Existing Precautions/Restrictions  fall  -CD     Barriers to Rehab  cognitive status;medically complex;visual deficit  -CD     Row Name 21 1344          Cognition    Orientation Status (Cognition)  oriented to;person SPEECH IS SLURRED AND DIFFICULT TO UNDERSTAND  -CD     Row Name 21 1348          Safety Issues, Functional Mobility    Cognitive Impairments, Mobility Safety/Performance  attention;awareness, need for assistance;insight into deficits/self-awareness;safety precaution awareness;safety precaution follow-through;sequencing abilities  -CD       User Key  (r) = Recorded By, (t) = Taken By, (c) = Cosigned By    Initials Name Provider Type    CD Zuly Hallman PT Physical Therapist        Mobility     Row Name 21 9279          Bed Mobility    Bed Mobility  sidelying-sit  -CD     Rolling Right  [place] (Bed Mobility)  moderate assist (50% patient effort);verbal cues;nonverbal cues (demo/gesture)  -CD     Sidelying-Sit [place] (Bed Mobility)  moderate assist (50% patient effort);2 person assist;verbal cues;nonverbal cues (demo/gesture)  -CD     Assistive Device (Bed Mobility)  bed rails;draw sheet;head of bed elevated  -CD     Comment (Bed Mobility)  INCREASED TIME AND EFFORT TO COMPLETE. VISUAL NEGLECT TO THE R, MANUAL ASSIST TO SCOOT HIPS TO EOB VIA DRAW SHEET.  -CD     Row Name 04/07/21 1353          Transfers    Comment (Transfers)  CUES FOR HAND PLACEMENT. PIVOT TRANSFER BED TO CHAIR TO THE L VIA B UE SUPPORT, DEMONSTRATING HEAVY POSTERIOR LEAN AND ATAXIC, SHUFFLING  STEPS.  -CD     Row Name 04/07/21 1353          Bed-Chair Transfer    Bed-Chair [place] (Transfers)  maximum assist (25% patient effort);2 person assist  -CD     Assistive Device (Bed-Chair Transfers)  -- B UE SUPPORT.  -CD     Row Name 04/07/21 1353          Sit-Stand Transfer    Sit-Stand [place] (Transfers)  moderate assist (50% patient effort);2 person assist PROGRESSED TO MIN ASSIST OF 2 ON SECOND  AND 3RD REP.  -CD     Row Name 04/07/21 1353          Gait/Stairs (Locomotion)    [place] Level (Gait)  moderate assist (50% patient effort);2 person assist;verbal cues  -CD     Distance in Feet (Gait)  20 FEET.  -CD     Deviations/Abnormal Patterns (Gait)  gait speed decreased;anupam decreased;festinating/shuffling;stride length decreased;ataxic  -CD     Bilateral Gait Deviations  forward flexed posture;heel strike decreased  -CD     Left Sided Gait Deviations  weight shift ability decreased;leans right;knee buckling, bilateral  -CD     Comment (Gait/Stairs)  B KNEE REMAINED FLEXED IN STANDING WITH R BUCKLING AS FATIGUED.  NOTED IMPROVED STS TRANSITION USING R WALKER WITH CUES. GAIT DISTANCE LIMITED BY FATIGUE. FOLLOWED WITH RECLINER.  -CD       User Key  (r) = Recorded By, (t) = Taken By, (c) = Cosigned By     Initials Name Provider Type    CD Zuly Hallman PT Physical Therapist        Obj/Interventions     Row Name 04/07/21 1359          Motor Skills    Coordination  gross motor deficit;bilateral;lower extremity;moderate impairment;ataxia STIFF MOVEMENT.  -CD     Muscle Tone  bilateral;lower extremity(s);hypertonia  -CD     Therapeutic Exercise  -- COMPLETED SEATED LE THER EX: 1 SET OF 10 REPS EACH. AAROM/AROM- HEEL SLIDED, HIP FLEX, LAQ, AP'S, SLR (5 REPS)  -CD     Row Name 04/07/21 1359          Balance    Static Sitting Balance  mild impairment;supported;sitting, edge of bed  -CD     Dynamic Sitting Balance  moderate impairment;supported;sitting, edge of bed  -CD     Static Standing Balance  moderate impairment;supported;standing  -CD     Dynamic Standing Balance  severe impairment;supported;standing  -CD     Balance Interventions  sitting;standing;sit to stand;supported;static;dynamic;weight shifting activity;dynamic reaching;core stability exercise;UE activity with balance activity  -CD     Comment, Balance  WORKED ON TRUNK ROTATION WITH REACHING ACROSS MIDLINE AND ROCKING ANTERIOR/POSTERIOR SITTING EOB. IMPROVED ABILITY TO MAINTAIN MIDLINE WITH CUES.  -CD       User Key  (r) = Recorded By, (t) = Taken By, (c) = Cosigned By    Initials Name Provider Type    CD Zuly Hallman, PT Physical Therapist        Goals/Plan     Row Name 04/07/21 1410          Bed Mobility Goal 1 (PT)    Activity/Assistive Device (Bed Mobility Goal 1, PT)  bed mobility activities, all;sit to supine  -CD     [place] Level/Cues Needed (Bed Mobility Goal 1, PT)  moderate assist (50-74% patient effort)  -CD     Time Frame (Bed Mobility Goal 1, PT)  long term goal (LTG);10 days  -CD     Progress/Outcomes (Bed Mobility Goal 1, PT)  goal ongoing;continuing progress toward goal  -CD     Row Name 04/07/21 1410          Transfer Goal 1 (PT)    Activity/Assistive Device (Transfer Goal 1, PT)   sit-to-stand/stand-to-sit;bed-to-chair/chair-to-bed;walker, rolling  -CD     [place] Level/Cues Needed (Transfer Goal 1, PT)  moderate assist (50-74% patient effort);2 person assist  -CD     Time Frame (Transfer Goal 1, PT)  long term goal (LTG);10 days  -CD     Progress/Outcome (Transfer Goal 1, PT)  continuing progress toward goal;goal ongoing MET FOR STS.  -CD     Row Name 04/07/21 1410          Gait Training Goal 1 (PT)    Activity/Assistive Device (Gait Training Goal 1, PT)  gait (walking locomotion);increase endurance/gait distance  -CD     [place] Level (Gait Training Goal 1, PT)  2 person assist;minimum assist (75% or more patient effort)  -CD     Distance (Gait Training Goal 1, PT)  100 FEET  -CD     Time Frame (Gait Training Goal 1, PT)  long term goal (LTG);10 days  -CD     Progress/Outcome (Gait Training Goal 1, PT)  goal revised this date PRIOR GOAL MET.  -CD       User Key  (r) = Recorded By, (t) = Taken By, (c) = Cosigned By    Initials Name Provider Type    CD Zuly Hallman, PT Physical Therapist        Clinical Impression     Row Name 04/07/21 1406          Pain Scale: Numbers Pre/Post-Treatment    Pretreatment Pain Rating  0/10 - no pain  -CD     Posttreatment Pain Rating  0/10 - no pain  -CD     Row Name 04/07/21 1406          Plan of Care Review    Plan of Care Reviewed With  patient  -CD     Progress  improving  -CD     Outcome Summary  PT DEMONSRATED IMPROVED BED MOBILITY AND STS TRANSFERS. INITIATED GAIT WITH MOD ASSIST OF 2 AND R WALKER X 20 FEET. PT LIMITED BY WEAKNESS, FATIGUE AND BALANCE DEFICITS BUT GIVES GOOD EFFORT. RECOMMEND SNF AT D/C.  -CD     Row Name 04/07/21 1401          Therapy Assessment/Plan (PT)    Rehab Potential (PT)  good, to achieve stated therapy goals  -CD     Criteria for Skilled Interventions Met (PT)  yes;meets criteria;skilled treatment is necessary  -CD     Row Name 04/07/21 1401          Vital Signs    Post Systolic BP Rehab  165  -CD     Post Treatment  Diastolic BP  90  -CD     Posttreatment Heart Rate (beats/min)  57  -CD     Pre SpO2 (%)  95  -CD     O2 Delivery Pre Treatment  room air  -CD     O2 Delivery Intra Treatment  room air  -CD     Post SpO2 (%)  95  -CD     O2 Delivery Post Treatment  room air  -CD     Pre Patient Position  Supine  -CD     Intra Patient Position  Standing  -CD     Post Patient Position  Sitting  -CD     Row Name 04/07/21 1403          Positioning and Restraints    Pre-Treatment Position  in bed  -CD     Post Treatment Position  chair  -CD     In Chair  reclined;call light within reach;encouraged to call for assist;exit alarm on  -CD       User Key  (r) = Recorded By, (t) = Taken By, (c) = Cosigned By    Initials Name Provider Type    CD Zuly Hallman PT Physical Therapist        Outcome Measures     Row Name 04/07/21 1409          How much help from another person do you currently need...    Turning from your back to your side while in flat bed without using bedrails?  2  -CD     Moving from lying on back to sitting on the side of a flat bed without bedrails?  2  -CD     Moving to and from a bed to a chair (including a wheelchair)?  2  -CD     Standing up from a chair using your arms (e.g., wheelchair, bedside chair)?  2  -CD     Climbing 3-5 steps with a railing?  1  -CD     To walk in hospital room?  2  -CD     AM-PAC 6 Clicks Score (PT)  11  -CD     Row Name 04/07/21 1409          Modified Syeda Scale    Modified [place] Scale  4 - Moderately severe disability.  Unable to walk without assistance, and unable to attend to own bodily needs without assistance.  -CD     Row Name 04/07/21 140          Functional Assessment    Outcome Measure Options  AM-PAC 6 Clicks Basic Mobility (PT);Modified [place]  -CD       User Key  (r) = Recorded By, (t) = Taken By, (c) = Cosigned By    Initials Name Provider Type    CD Zuly Hallman PT Physical Therapist        Physical Therapy Education                 Title: PT OT SLP Therapies (In  Progress)     Topic: Physical Therapy (Done)     Point: Mobility training (Done)     Learning Progress Summary           Patient Acceptance, E, VU,NR by CD at 4/7/2021 1409    Comment: SEE FLOWSHEET    Acceptance, E,D, VU,NR by CS at 4/5/2021 1305    Comment: Educated patient on bed mobility sequencing, safe hand placement with transfers, bed to chair transfer sequencing, ther ex, and plan for progression of care.    Acceptance, E,D, NL,NR by  at 4/2/2021 1530    Acceptance, E, NR by AS at 3/29/2021 1038    Acceptance, E, NR by SC at 3/28/2021 1010    Comment: REVIEWED BENEFITS OF ACTIVITY                   Point: Home exercise program (Done)     Learning Progress Summary           Patient Acceptance, E, VU,NR by CD at 4/7/2021 1409    Comment: SEE FLOWSHEET    Acceptance, E,D, VU,NR by CS at 4/5/2021 1305    Comment: Educated patient on bed mobility sequencing, safe hand placement with transfers, bed to chair transfer sequencing, ther ex, and plan for progression of care.    Acceptance, E,D, NL,NR by  at 4/2/2021 1530    Acceptance, E, NR by AS at 3/29/2021 1038    Acceptance, E, NR by SC at 3/28/2021 1010    Comment: REVIEWED BENEFITS OF ACTIVITY                   Point: Body mechanics (Done)     Learning Progress Summary           Patient Acceptance, E, VU,NR by CD at 4/7/2021 1409    Comment: SEE FLOWSHEET    Acceptance, E,D, VU,NR by CS at 4/5/2021 1305    Comment: Educated patient on bed mobility sequencing, safe hand placement with transfers, bed to chair transfer sequencing, ther ex, and plan for progression of care.    Acceptance, E,D, NL,NR by  at 4/2/2021 1530    Acceptance, E, NR by AS at 3/29/2021 1038    Acceptance, E, NR by SC at 3/28/2021 1010    Comment: REVIEWED BENEFITS OF ACTIVITY                   Point: Precautions (Done)     Learning Progress Summary           Patient Acceptance, E, VU,NR by CD at 4/7/2021 1409    Comment: SEE FLOWSHEET    Acceptance, E,D, VU,NR by CS at 4/5/2021 1305     Comment: Educated patient on bed mobility sequencing, safe hand placement with transfers, bed to chair transfer sequencing, ther ex, and plan for progression of care.    Acceptance, E,D, NL,NR by  at 4/2/2021 1530    Acceptance, E, NR by AS at 3/29/2021 1038    Acceptance, E, NR by SC at 3/28/2021 1010    Comment: REVIEWED BENEFITS OF ACTIVITY                               User Key     Initials Effective Dates Name Provider Type Discipline    SC 06/19/15 -  Bryce Bran, PT Physical Therapist PT     06/19/15 -  Zuly Hallman, PT Physical Therapist PT    AS 06/22/15 -  Annette Ramirez, PTA Physical Therapy Assistant PT     06/08/18 -  Nava Wilburn, PT Physical Therapist PT     03/26/19 -  Yoana Gramajo, PT Physical Therapist PT              PT Recommendation and Plan  Planned Therapy Interventions (PT): balance training, bed mobility training, gait training, home exercise program, transfer training, neuromuscular re-education, strengthening, motor coordination training, postural re-education, patient/family education  Plan of Care Reviewed With: patient  Progress: improving  Outcome Summary: PT DEMONSRATED IMPROVED BED MOBILITY AND STS TRANSFERS. INITIATED GAIT WITH MOD ASSIST OF 2 AND R WALKER X 20 FEET. PT LIMITED BY WEAKNESS, FATIGUE AND BALANCE DEFICITS BUT GIVES GOOD EFFORT. RECOMMEND SNF AT D/C.     Time Calculation:   PT Charges     Row Name 04/07/21 1410             Time Calculation    Start Time  1309  -CD      PT Received On  04/07/21  -      PT Goal Re-Cert Due Date  04/17/21  -         Time Calculation- PT    Total Timed Code Minutes- PT  31 minute(s)  -CD         Timed Charges    10202 - PT Therapeutic Exercise Minutes  11  -CD      75191 - Gait Training Minutes   10  -CD      32278 - PT Therapeutic Activity Minutes  10  -CD        User Key  (r) = Recorded By, (t) = Taken By, (c) = Cosigned By    Initials Name Provider Type    CD Zuly Hallman, PT Physical Therapist         Therapy Charges for Today     Code Description Service Date Service Provider Modifiers Qty    30882207211  GAIT TRAINING EA 15 MIN 4/7/2021 Zuly Hallman, PT GP 1    [id number] HC PT THER PROC EA 15 MIN 4/7/2021 Zuly Hallman, PT GP 1    [id number]  PT THER SUPP EA 15 MIN 4/7/2021 Zuly Hallman, PT GP 2          PT G-Codes  Outcome Measure Options: AM-PAC 6 Clicks Basic Mobility (PT), Modified [place]  AM-PAC 6 Clicks Score (PT): 11  AM-PAC 6 Clicks Score (OT): 10  Modified Syeda Scale: 4 - Moderately severe disability.  Unable to walk without assistance, and unable to attend to own bodily needs without assistance.    Zuly Hallman, PT  4/7/2021

## 2021-04-07 NOTE — PLAN OF CARE
Goal Outcome Evaluation:  Plan of Care Reviewed With: patient  Progress: improving  Outcome Summary: PT DEMONSRATED IMPROVED BED MOBILITY AND STS TRANSFERS. INITIATED GAIT WITH MOD ASSIST OF 2 AND R WALKER X 20 FEET. PT LIMITED BY WEAKNESS, FATIGUE AND BALANCE DEFICITS BUT GIVES GOOD EFFORT. RECOMMEND SNF AT D/C.

## 2021-04-07 NOTE — THERAPY TREATMENT NOTE
Acute Care - Speech Language Pathology Treatment Note  [facility]     Patient Name: Sebastian Winter  : 1940  MRN: [id number]  Today's Date: 2021               Admit Date: 3/26/2021     Visit Dx:    ICD-10-CM ICD-9-CM   1. Oropharyngeal dysphagia  R13.12 787.22   2. Acute left-sided weakness  R53.1 728.87   3. Aphasia  R47.01 784.3   4. Dysarthria  R47.1 784.51     Patient Active Problem List   Diagnosis   • Acute left-sided weakness   • Dysarthria   • Dementia without behavioral disturbance (CMS/HCC)   • Hypothyroidism (acquired)   • GERD without esophagitis   • Cardiomyopathy (CMS/HCC)   • Essential hypertension   • Dyslipidemia     Past Medical History:   Diagnosis Date   • Dementia (CMS/HCC)    • Depression    • GERD (gastroesophageal reflux disease)    • Hypothyroid      Past Surgical History:   Procedure Laterality Date   • JOINT REPLACEMENT          SLP EVALUATION (last 72 hours)      SLP SLC Evaluation     Row Name 21 1030 21 1400 21 1045             Communication Assessment/Intervention    Document Type  therapy note (daily note)  (Pended)   -LB  --  --      Subjective Information  no complaints  (Pended)   -LB  --  --      Patient Observations  alert;cooperative;agree to therapy  (Pended)   -LB  --  --      Patient/Family/Caregiver Comments/Observations  No family was present.  (Pended)   -LB  --  Not present  (Pended)   -LB      Care Plan Review  care plan/treatment goals reviewed  (Pended)   -LB  --  care plan/treatment goals reviewed  (Pended)   -LB      Patient Effort  good  (Pended)   -LB  --  good  (Pended)   -LB      Comment  Pt was given adapted cup and utensils by other medical professionals.  (Pended)   -LB  --  --      Symptoms Noted During/After Treatment  none  (Pended)   -LB  --  none  (Pended)   -LB         General Information    Patient Profile Reviewed  yes  (Pended)   -LB  --  yes  (Pended)   -LB      Pertinent History Of Current Problem  See initial  evaluation.  (Pended)   -LB  --  --      Precautions/Limitations, Vision  WFL with corrective lenses;for purposes of eval  (Pended)   -LB  --  --      Precautions/Limitations, Hearing  WFL;for purposes of eval  (Pended)   -LB  --  --      Prior Level of Function-Communication  cognitive-linguistic impairment  (Pended)   -LB  --  --      Plans/Goals Discussed with  patient;agreed upon  (Pended)   -LB  --  --      Barriers to Rehab  none identified  (Pended)   -LB  --  --      Patient's Goals for Discharge  patient did not state  (Pended)   -LB  --  --         Pain    Additional Documentation  Pain Scale: Numbers Pre/Post-Treatment (Group)  (Pended)   -LB  --  --         Pain Scale: Numbers Pre/Post-Treatment    Pretreatment Pain Rating  0/10 - no pain  (Pended)   -LB  --  0/10 - no pain  (Pended)   -LB      Posttreatment Pain Rating  0/10 - no pain  (Pended)   -LB  --  0/10 - no pain  (Pended)   -LB         SLP Clinical Impressions    Daily Summary of Progress (SLP)  progress toward functional goals is good  (Pended)   -LB  --  progress toward functional goals is good  (Pended)   -LB      Barriers to Overall Progress (SLP)  --  --  Independent recall of articulation strategies  (Pended)   -LB      SLP Diagnosis  Pt presents w/ mild-mod dysarthria. Pt suspected of having mild-mod receptive and expressive deficits.  (Pended)   -LB  --  --      Rehab Potential/Prognosis  good  (Pended)   -LB  fair  -CJ  --      SLC Criteria for Skilled Therapy Interventions Met  yes  (Pended)   -LB  yes  -CJ  yes  (Pended)   -LB      Functional Impact  functional impact in social situations;functional impact in ADLs;difficulty communicating wants, needs;difficulty communicating in an emergency;difficulty in expressing complex messages;restrictions in personal and social life  (Pended)   -LB  functional impact in social situations;functional impact in ADLs;difficulty communicating wants, needs;difficulty communicating in an  emergency;difficulty in expressing complex messages;restrictions in personal and social life  -CJ  functional impact in social situations;functional impact in ADLs;difficulty communicating wants, needs;difficulty communicating in an emergency;difficulty in expressing complex messages;restrictions in personal and social life  (Pended)   -LB      Plan for Continued Treatment (SLP)  Pt participated in dysphagia and cog-comm tx this am. No over s/s on nectar thick and puree consistencies. S/s on thin by cup sip but not via ice chips or H2O by teaspoon.  (Pended)   -LB  --  Continue to follow treatment goals for dysphagia and language.  (Pended)   -LB         Recommendations    Therapy Frequency (SLP SLC)  5 days per week  (Pended)   -LB  5 days per week  -CJ  --      Predicted Duration Therapy Intervention (Days)  until discharge  (Pended)   -LB  --  --      Anticipated Discharge Disposition (SLP)  unknown;anticipate therapy at next level of care  (Pended)   -LB  --  --         Communication Treatment Objective and Progress Goals (SLP)    Auditory Comprehension Treatment Objectives  --  --  Auditory Comprehension Treatment Objectives (Group)  (Pended)   -LB      Verbal Expression Treatment Objectives  --  --  Verbal Expression Treatment Objectives (Group)  (Pended)   -LB      Motor Speech/Dysarthria Treatment Objectives  --  --  Motor Speech/Dysarthria Treatment Objectives (Group)  (Pended)   -LB      Additional Goals  --  --  Additional Goals (Group)  (Pended)   -LB         Auditory Comprehension Treatment Objectives    Words/Phrases/Sentences Selection  --  --  words/phrases/sentences, SLP goal 1  (Pended)   -LB      Comprehend Questions Selection  --  --  comprehend questions, SLP goal 1  (Pended)   -LB      Follow Directions Selection  --  --  follow directions, SLP goal 1  (Pended)   -LB         Words/Phrases/Sentences Goal 1 (SLP)    Improve Ability to Comprehend Words/Phrases/Sentences Through: Goal 1 (SLP)  --   --  identify body part;identify familiar objects;80%;with minimal cues (75-90%)  (Pended)   -LB      Time Frame (Identify Objects and Pictures Goal 1, SLP)  --  --  short term goal (STG)  (Pended)   -LB         Comprehend Questions Goal 1 (SLP)    Improve Ability to Comprehend Questions Goal 1 (SLP)  --  --  simple yes/no questions;80%;with minimal cues (75-90%)  (Pended)   -LB      Time Frame (Comprehend Questions Goal 1, SLP)  --  --  short term goal (STG)  (Pended)   -LB         Follow Directions Goal 2 (SLP)    Improve Ability to Follow Directions Goal 1 (SLP)  --  --  1 step direction without objects;with minimal cues (75-90%);2 step commands;with moderate cues (50-74%);80%  (Pended)   -LB      Time Frame (Follow Directions Goal 1, SLP)  --  --  short term goal (STG)  (Pended)   -LB         Verbal Expression Treatment Objectives    Word Retrieval Skills Selection  --  --  word retrieval, SLP goal 1  (Pended)   -LB      Phrase and Sentence Level Response Selection  --  --  phrase and sentence level response, SLP goal 1  (Pended)   -LB         Word Retrieval Skills Goal 1 (SLP)    Improve Word Retrieval Skills By Goal 1 (SLP)  --  --  confrontational naming task;responsive naming task;repeating words;completing open ended structured sentence;80%;with minimal cues (75-90%)  (Pended)   -LB      Time Frame (Word Retrieval Goal 1, SLP)  --  --  short term goal (STG)  (Pended)   -LB         Motor Speech/Dysarthria Treatment Objectives    Phonation Selection  --  --  phonation, SLP goal 1  (Pended)   -LB      Articulation Selection  --  --  articulation, SLP goal 1  (Pended)   -LB         Phonation Goal 1 (SLP)    Improve Phonation By Goal 1 (SLP)  --  --  using loud speech;80%;with minimal cues (75-90%)  (Pended)   -LB      Time Frame (Phonation Goal 1, SLP)  --  --  short term goal (STG)  (Pended)   -LB         Articulation Goal 1 (SLP)    Improve Articulation Goal 1 (SLP)  --  --  by over-articulating at word  level;80%;with moderate cues (50-74%)  (Pended)   -LB      Time Frame (Articulation Goal 1, SLP)  --  --  short term goal (STG)  (Pended)   -LB         Additional Goals    Additional Goal Selection  --  --  additional goal, SLP goal 1  (Pended)   -LB         Additional Goal 1 (SLP)    Additional Goal 1, SLP  --  --  LTG: Pt will improve communication skills to actively participate in care w/ 80% accuracy and min cues.   (Pended)   -LB      Time Frame (Additional Goal 1, SLP)  --  --  by discharge  (Pended)   -LB        User Key  (r) = Recorded By, (t) = Taken By, (c) = Cosigned By    Initials Name Effective Dates    CJ Kadi Nelson, MS CCC-SLP 02/11/20 -     Chuckie Pérez, Speech Therapy Student 03/09/21 -              EDUCATION  The patient has been educated in the following areas:     Cognitive Impairment Communication Impairment Dysphagia (Swallowing Impairment).    SLP Recommendation and Plan  SLP Diagnosis: (P) Pt presents w/ mild-mod dysarthria. Pt suspected of having mild-mod receptive and expressive deficits.        SLC Criteria for Skilled Therapy Interventions Met: (P) yes  Anticipated Discharge Disposition (SLP): (P) unknown, anticipate therapy at next level of care        Predicted Duration Therapy Intervention (Days): (P) until discharge  Daily Summary of Progress (SLP): (P) progress toward functional goals is good  Plan for Continued Treatment (SLP): (P) Pt participated in dysphagia and cog-comm tx this am. No over s/s on nectar thick and puree consistencies. S/s on thin by cup sip but not via ice chips or H2O by teaspoon.             Plan of Care Reviewed With: (P) patient  Progress: (P) improving      SLP GOALS     Row Name 04/07/21 1030 04/06/21 1400 04/05/21 1045       Oral Nutrition/Hydration Goal 1 (SLP)    Oral Nutrition/Hydration Goal 1, SLP  LTG: Pt will demonstrate functional swallow for regular/thin diet with use of compensatory strategies as needed.  (Pended)   -LB  LTG: Pt will  demonstrate functional swallow for regular/thin diet with use of compensatory strategies as needed.  -CJ  LTG: Pt will demonstrate functional swallow for regular/thin diet with use of compensatory strategies as needed.  (Pended)   -LB    Time Frame (Oral Nutrition/Hydration Goal 1, SLP)  by discharge  (Pended)   -LB  by discharge  -CJ  by discharge  (Pended)   -LB    Progress/Outcomes (Oral Nutrition/Hydration Goal 1, SLP)  goal ongoing  (Pended)   -LB  goal ongoing  -CJ  continuing progress toward goal  (Pended)   -LB       Oral Nutrition/Hydration Goal 2 (SLP)    Oral Nutrition/Hydration Goal 2, SLP  Pt will tolerate current diet of pureed solids and nectar-thick liquids with no s/sx aspiration with 100% accuracy.  (Pended)   -LB  Pt will tolerate current diet of pureed solids and nectar-thick liquids with no s/sx aspiration with 100% accuracy.  -CJ  Pt will tolerate current diet of pureed solids and nectar-thick liquids with no s/sx aspiration with 100% accuracy.  (Pended)   -LB    Time Frame (Oral Nutrition/Hydration Goal 2, SLP)  short term goal (STG)  (Pended)   -LB  short term goal (STG)  -CJ  short term goal (STG)  (Pended)   -LB    Barriers (Oral Nutrition/Hydration Goal 2, SLP)  no overt s/s of aspiration w/ nectar thick or puree consistencies  (Pended)   -LB  no overt s/s of aspiration w/ nectar thick or puree consistencies  -CJ  Pt tolerated 3 trials of applesauce via teaspioon w/o s/sx   (Pended)   -LB    Progress/Outcomes (Oral Nutrition/Hydration Goal 2, SLP)  continuing progress toward goal  (Pended)   -LB  continuing progress toward goal  -CJ  continuing progress toward goal  (Pended)   -LB       Oral Nutrition/Hydration Goal (SLP)    Oral Nutrition/Hydration Goal, SLP  Pt will accept trials of thin liquids with no s/sx aspiration w/ 60% accuracy to determine readiness of diet upgrade.  (Pended)   -LB  Pt will accept trials of thin liquids with no s/sx aspiration w/ 60% accuracy to determine  readiness of diet upgrade.  -CJ  Pt will accept trials of thin liquids with no s/sx aspiration w/ 60% accuracy to determine readiness of diet upgrade.  (Pended)   -LB    Time Frame (Oral Nutrition/Hydration Goal, SLP)  short term goal (STG)  (Pended)   -LB  short term goal (STG)  -CJ  short term goal (STG)  (Pended)   -LB    Barriers (Oral Nutrition/Hydration Goal, SLP)  Pt coughed on 2/2 H2O trials via cup sip. Patient coughed on 1/4 trials via spoon.  (Pended)   -LB  --  no s/sx of aspiration  (Pended)   -LB    Progress/Outcomes (Oral Nutrition/Hydration Goal, SLP)  continuing progress toward goal  (Pended)   -LB  goal ongoing  -CJ  continuing progress toward goal  (Pended)   -LB       Labial Strengthening Goal 1 (SLP)    Activity (Labial Strengthening Goal 1, SLP)  increase labial tone;increase labial sensation/afferent drive  (Pended)   -LB  increase labial tone;increase labial sensation/afferent drive  -CJ  increase labial tone;increase labial sensation/afferent drive  (Pended)   -LB    Increase Labial Tone  labial resistance exercises  (Pended)   -LB  labial resistance exercises  -CJ  labial resistance exercises  (Pended)   -LB    Increase Labial Sensation/Afferent Drive  swallow trials  (Pended)   -LB  swallow trials  -CJ  swallow trials  (Pended)   -LB    [place]/Accuracy (Labial Strengthening Goal 1, SLP)  with moderate cues (50-74% accuracy)  (Pended)   -LB  with moderate cues (50-74% accuracy)  -CJ  with moderate cues (50-74% accuracy)  (Pended)   -LB    Time Frame (Labial Strengthening Goal 1, SLP)  short term goal (STG)  (Pended)   -LB  short term goal (STG)  -CJ  short term goal (STG)  (Pended)   -LB    Barriers (Labial Strengthening Goal 1, SLP)  --  pt performed x2 sets x5 reps   -CJ  Pt had difficulty completing labial resistance exercises.  (Pended)   -LB    Progress/Outcomes (Labial Strengthening Goal 1, SLP)  goal ongoing  (Pended)   -LB  continuing progress toward goal  -CJ  goal ongoing   (Pended)   -LB       Lingual Strengthening Goal 1 (SLP)    Activity (Lingual Strengthening Goal 1, SLP)  increase lingual tone/sensation/control/coordination/movement;increase tongue back strength  (Pended)   -LB  increase lingual tone/sensation/control/coordination/movement;increase tongue back strength  -CJ  increase lingual tone/sensation/control/coordination/movement;increase tongue back strength  (Pended)   -LB    Increase Lingual Tone/Sensation/Control/Coordination/Movement  lingual movement exercises;lingual resistance exercises  (Pended)   -LB  lingual movement exercises;lingual resistance exercises  -CJ  lingual movement exercises;lingual resistance exercises  (Pended)   -LB    Increase Tongue Back Strength  lingual resistance exercises  (Pended)   -LB  lingual resistance exercises  -CJ  lingual resistance exercises  (Pended)   -LB    [place]/Accuracy (Lingual Strengthening Goal 1, SLP)  with moderate cues (50-74% accuracy)  (Pended)   -LB  with moderate cues (50-74% accuracy)  -CJ  with moderate cues (50-74% accuracy)  (Pended)   -LB    Time Frame (Lingual Strengthening Goal 1, SLP)  short term goal (STG)  (Pended)   -LB  short term goal (STG)  -CJ  short term goal (STG)  (Pended)   -LB    Barriers (Lingual Strengthening Goal 1, SLP)  --  Pt performed x2 sets x5 reps  -CJ  Pt pushed tongue against fork (tongue needed flater surface) for tongue strengthening exercise.  (Pended)   -LB    Progress/Outcomes (Lingual Strengthening Goal 1, SLP)  goal ongoing  (Pended)   -LB  continuing progress toward goal  -CJ  continuing progress toward goal  (Pended)   -LB       Pharyngeal Strengthening Exercise Goal 1 (SLP)    Activity (Pharyngeal Strengthening Goal 1, SLP)  increase timing;increase superior movement of the hyolaryngeal complex;increase anterior movement of the hyolaryngeal complex;increase closure at entrance to airway/closure of airway at glottis;increase squeeze/positive pressure generation;increase  tongue base retraction  (Pended)   -LB  increase timing;increase superior movement of the hyolaryngeal complex;increase anterior movement of the hyolaryngeal complex;increase closure at entrance to airway/closure of airway at glottis;increase squeeze/positive pressure generation;increase tongue base retraction  -CJ  increase timing;increase superior movement of the hyolaryngeal complex;increase anterior movement of the hyolaryngeal complex;increase closure at entrance to airway/closure of airway at glottis;increase squeeze/positive pressure generation;increase tongue base retraction  (Pended)   -LB    Increase Timing  prepping - 3 second prep or suck swallow or 3-step swallow  (Pended)   -LB  prepping - 3 second prep or suck swallow or 3-step swallow  -CJ  prepping - 3 second prep or suck swallow or 3-step swallow  (Pended)   -LB    Increase Superior Movement of the Hyolaryngeal Complex  falsetto  (Pended)   -LB  falsetto  -CJ  falsetto  (Pended)   -LB    Increase Anterior Movement of the Hyolaryngeal Complex  chin tuck against resistance (CTAR)  (Pended)   -LB  chin tuck against resistance (CTAR)  -CJ  chin tuck against resistance (CTAR)  (Pended)   -LB    Increase Closure at Entrance to Airway/Closure of Airway at Glottis  super-supraglottic swallow  (Pended)   -LB  super-supraglottic swallow  -CJ  super-supraglottic swallow  (Pended)   -LB    Increase Squeeze/Positive Pressure Generation  effortful pitch glide (falsetto + pharyngeal squeeze)  (Pended)   -LB  effortful pitch glide (falsetto + pharyngeal squeeze)  -CJ  effortful pitch glide (falsetto + pharyngeal squeeze)  (Pended)   -LB    Increase Tongue Base Retraction  hard effortful swallow  (Pended)   -LB  hard effortful swallow  -CJ  hard effortful swallow  (Pended)   -LB    [place]/Accuracy (Pharyngeal Strengthening Goal 1, SLP)  with moderate cues (50-74% accuracy)  (Pended)   -LB  with moderate cues (50-74% accuracy)  -CJ  with moderate cues (50-74%  accuracy)  (Pended)   -LB    Time Frame (Pharyngeal Strengthening Goal 1, SLP)  short term goal (STG)  (Pended)   -LB  short term goal (STG)  -CJ  short term goal (STG)  (Pended)   -LB    Barriers (Pharyngeal Strengthening Goal 1, SLP)  Pt performed CTAR x3 reps; falsetto x4 reps; 3 second prep x 3 reps;   (Pended)   -LB  Pt performed CTAR x2 set x3 reps; falsetto x2 sets x5 reps; effortful swallow x2 sets x5 reps  -CJ  Pt completed 3 trials of CTAR, 3 second prep, and falsetto pitch glide. Pt unable to complete effortful swallow trials.   (Pended)   -LB    Progress/Outcomes (Pharyngeal Strengthening Goal 1, SLP)  continuing progress toward goal;other (see comments)  (Pended)  Pt difficulty w/ 3 second prep. No SGS or pitch glide  -LB  continuing progress toward goal  -CJ  --       Swallow Compensatory Strategies Goal 1 (SLP)    Activity (Swallow Compensatory Strategies/Techniques Goal 1, SLP)  compensatory strategies;postural techniques;small bites;small cup sips  (Pended)   -LB  compensatory strategies;postural techniques;small bites;small cup sips  -CJ  compensatory strategies;postural techniques;small bites;small cup sips  (Pended)   -LB    [place]/Accuracy (Swallow Compensatory Strategies/Techniques Goal 1, SLP)  with minimal cues (75-90% accuracy)  (Pended)   -LB  with minimal cues (75-90% accuracy)  -CJ  with minimal cues (75-90% accuracy)  (Pended)   -LB    Time Frame (Swallow Compensatory Strategies/Techniques Goal 1, SLP)  short term goal (STG)  (Pended)   -LB  short term goal (STG)  -CJ  short term goal (STG)  (Pended)   -LB    Barriers (Swallow Compensatory Strategies/Techniques Goal 1, SLP)  --  Pt required min cues for small bites/sips  -CJ  Pt unable to independently recall strategies.   (Pended)   -LB    Progress/Outcomes (Swallow Compensatory Strategies/Techniques Goal 1, SLP)  goal ongoing  (Pended)   -LB  continuing progress toward goal  -CJ  progress slower than expected  (Pended)   -LB        Words/Phrases/Sentences Goal 1 (SLP)    Improve Ability to Comprehend Words/Phrases/Sentences Through: Goal 1 (SLP)  identify body part;identify familiar objects;80%;with minimal cues (75-90%)  (Pended)   -LB  identify body part;identify familiar objects;80%;with minimal cues (75-90%)  -CJ  identify body part;identify familiar objects;80%;with minimal cues (75-90%)  (Pended)   -LB    Time Frame (Identify Objects and Pictures Goal 1, SLP)  short term goal (STG)  (Pended)   -LB  short term goal (STG)  -CJ  short term goal (STG)  (Pended)   -LB    Progress (Ability to Contruct Words/Phrases/Sentences Goal 1, SLP)  60%;70%;with moderate cues (50-74%)  (Pended)   -LB  50%;with moderate cues (50-74%)  -CJ  --    Progress/Outcomes (Identify Objects and Pictures Goal 1, SLP)  continuing progress toward goal  (Pended)   -LB  continuing progress toward goal  -CJ  goal ongoing  (Pended)   -LB    Comment (Words/Phrases/Sentences Goal 1, SLP)  Pt identified objects in room. Pt required phonemic cues.  (Pended)   -LB  --  --       Comprehend Questions Goal 1 (SLP)    Improve Ability to Comprehend Questions Goal 1 (SLP)  simple yes/no questions;80%;with minimal cues (75-90%)  (Pended)   -LB  simple yes/no questions;80%;with minimal cues (75-90%)  -CJ  simple yes/no questions;80%;with minimal cues (75-90%)  (Pended)   -LB    Time Frame (Comprehend Questions Goal 1, SLP)  short term goal (STG)  (Pended)   -LB  short term goal (STG)  -CJ  short term goal (STG)  (Pended)   -LB    Progress (Ability to Comprehend Questions Goal 1, SLP)  70%;with minimal cues (75-90%)  (Pended)   -LB  70%;with minimal cues (75-90%)  -CJ  50%;with moderate cues (50-74%)  (Pended)   -LB    Progress/Outcomes (Comprehend Questions Goal 1, SLP)  continuing progress toward goal  (Pended)   -LB  continuing progress toward goal  -CJ  progress slower than expected  (Pended)   -LB       Follow Directions Goal 2 (SLP)    Improve Ability to Follow Directions Goal  1 (SLP)  1 step direction without objects;with minimal cues (75-90%);2 step commands;with moderate cues (50-74%);80%  (Pended)   -LB  1 step direction without objects;with minimal cues (75-90%);2 step commands;with moderate cues (50-74%);80%  -CJ  1 step direction without objects;with minimal cues (75-90%);2 step commands;with moderate cues (50-74%);80%  (Pended)   -LB    Time Frame (Follow Directions Goal 1, SLP)  short term goal (STG)  (Pended)   -LB  short term goal (STG)  -CJ  short term goal (STG)  (Pended)   -LB    Progress (Ability to Follow Directions Goal 1, SLP)  80%;with minimal cues (75-90%)  (Pended)   -LB  70%;with minimal cues (75-90%)  -CJ  60%;with moderate cues (50-74%)  (Pended)   -LB    Progress/Outcomes (Follow Directions Goal 1, SLP)  continuing progress toward goal  (Pended)   -LB  continuing progress toward goal  -CJ  continuing progress toward goal  (Pended)   -LB    Comment (Follow Directions Goal 1, SLP)  --  --  Pt able to follow directions for treatment exercises and compensatory strategies.  (Pended)   -LB       Word Retrieval Skills Goal 1 (SLP)    Improve Word Retrieval Skills By Goal 1 (SLP)  confrontational naming task;responsive naming task;repeating words;completing open ended structured sentence;80%;with minimal cues (75-90%)  (Pended)   -LB  confrontational naming task;responsive naming task;repeating words;completing open ended structured sentence;80%;with minimal cues (75-90%)  -CJ  confrontational naming task;responsive naming task;repeating words;completing open ended structured sentence;80%;with minimal cues (75-90%)  (Pended)   -LB    Time Frame (Word Retrieval Goal 1, SLP)  short term goal (STG)  (Pended)   -LB  short term goal (STG)  -CJ  short term goal (STG)  (Pended)   -LB    Progress (Word Retrieval Skills Goal 1, SLP)  60%;70%;with moderate cues (50-74%)  (Pended)   -LB  70%;with minimal cues (75-90%)  -CJ  --    Progress/Outcomes (Word Retrieval Goal 1, SLP)   continuing progress toward goal  (Pended)   -LB  continuing progress toward goal  -CJ  goal ongoing  (Pended)   -LB    Comment (Word Retrieval Goal 1, SLP)  Confrontational naming of objects in room.  (Pended)   -LB  completing open ended sentence.   -CJ  --       Phonation Goal 1 (SLP)    Improve Phonation By Goal 1 (SLP)  using loud speech;80%;with minimal cues (75-90%)  (Pended)   -LB  using loud speech;80%;with minimal cues (75-90%)  -CJ  using loud speech;80%;with minimal cues (75-90%)  (Pended)   -LB    Time Frame (Phonation Goal 1, SLP)  short term goal (STG)  (Pended)   -LB  short term goal (STG)  -CJ  short term goal (STG)  (Pended)   -LB    Progress (Phonation Goal 1, SLP)  70%;with minimal cues (75-90%)  (Pended)   -LB  70%;with moderate cues (50-74%)  -CJ  --    Progress/Outcomes (Phonation Goal 1, SLP)  continuing progress toward goal  (Pended)   -LB  continuing progress toward goal  -CJ  --    Comment (Phonation Goal 1, SLP)  Loud speech was utilized w/ occasional reminders to speak louder when message was unclear.   (Pended)   -LB  loud speech improved at word level  -CJ  --       Articulation Goal 1 (SLP)    Improve Articulation Goal 1 (SLP)  by over-articulating at word level;80%;with moderate cues (50-74%)  (Pended)   -LB  by over-articulating at word level;80%;with moderate cues (50-74%)  -CJ  by over-articulating at word level;80%;with moderate cues (50-74%)  (Pended)   -LB    Time Frame (Articulation Goal 1, SLP)  short term goal (STG)  (Pended)   -LB  short term goal (STG)  -CJ  short term goal (STG)  (Pended)   -LB    Progress (Articulation Goal 1, SLP)  60%;with minimal cues (75-90%)  (Pended)   -LB  40%;with moderate cues (50-74%)  -CJ  60%;with maximum cues (25-49%)  (Pended)   -LB    Progress/Outcomes (Articulation Goal 1, SLP)  continuing progress toward goal  (Pended)   -LB  continuing progress toward goal  -CJ  progress slower than expected  (Pended)   -LB    Comment (Articulation Goal  1, SLP)  Pt articulation improved when occasional reminders to overarticulate were given.  (Pended)   -LB  --  Pt required continuous prompts to use overarticulated speech.  (Pended)   -LB       Additional Goal 1 (SLP)    Additional Goal 1, SLP  LTG: Pt will improve communication skills to actively participate in care w/ 80% accuracy and min cues.   (Pended)   -LB  LTG: Pt will improve communication skills to actively participate in care w/ 80% accuracy and min cues.   -CJ  LTG: Pt will improve communication skills to actively participate in care w/ 80% accuracy and min cues.   (Pended)   -LB    Time Frame (Additional Goal 1, SLP)  by discharge  (Pended)   -LB  by discharge  -CJ  by discharge  (Pended)   -LB    Progress/Outcomes (Additional Goal 1, SLP)  continuing progress toward goal  (Pended)   -LB  continuing progress toward goal  -CJ  continuing progress toward goal  (Pended)   -LB      User Key  (r) = Recorded By, (t) = Taken By, (c) = Cosigned By    Initials Name Provider Type    Kadi Robles MS CCC-SLP Speech and Language Pathologist    Chuckie Pérez, Speech Therapy Student Speech Therapy Student                  Time Calculation:     Time Calculation- SLP     Row Name 04/07/21 1306             Time Calculation- SLP    SLP Start Time  1030  (Pended)   -LB      SLP Received On  04/07/21  (Pended)   -LB        User Key  (r) = Recorded By, (t) = Taken By, (c) = Cosigned By    Initials Name Provider Type    Chuckie Pérez, Speech Therapy Student Speech Therapy Student          Therapy Charges for Today     Code Description Service Date Service Provider Modifiers Qty    12881860694 HC ST TREATMENT SPEECH 2 4/7/2021 Chuckie Hunt Speech Therapy Student GN 1    72898125510 HC ST TREATMENT SWALLOW 3 4/7/2021 Chuckie Hunt Speech Therapy Student GN 1                     Yolis Solorzano Therapy Student  4/7/2021   Patient was not wearing a face mask and did not exhibit coughing during this  therapy encounter.  Procedure performed was aerosolizing, involved close contact (within 6 feet for at least 15 minutes or longer), and did not involve contact with infectious secretions or specimens.  Therapist used appropriate personal protective equipment including gloves, standard procedure mask and eye protection.  Appropriate PPE was worn during the entire therapy session.  Hand hygiene was completed before and after therapy session.  Blanca Lenz was also present during this encounter and the aforementioned applies to them, as well.

## 2021-04-07 NOTE — PROGRESS NOTES
Continued Stay Note  [facility]     Patient Name: Sebastian Winter  MRN: [id number]  Today's Date: 4/7/2021    Admit Date: 3/26/2021    Discharge Plan     Row Name 04/07/21 0922       Plan    Plan  [facility]    Patient/Family in Agreement with Plan  yes    Plan Comments  Spoke with [facility] by phone. They can accept the patient anytime as long as he has a negative COVID test. CM is still waiting for transportation to be arranged for later today or tomorrow. CM will continue to follow.    Final Discharge Disposition Code  03 - skilled nursing facility (SNF)        Discharge Codes    No documentation.       Expected Discharge Date and Time     Expected Discharge Date Expected Discharge Time    Apr 8, 2021             Hipolito Thompson RN

## 2021-04-07 NOTE — THERAPY PROGRESS REPORT/RE-CERT
Patient Name: Sebastian Winter  : 1940    MRN: [id number]                              Today's Date: 2021       Admit Date: 3/26/2021    Visit Dx:     ICD-10-CM ICD-9-CM   1. Oropharyngeal dysphagia  R13.12 787.22   2. Acute left-sided weakness  R53.1 728.87   3. Aphasia  R47.01 784.3   4. Dysarthria  R47.1 784.51     Patient Active Problem List   Diagnosis   • Acute left-sided weakness   • Dysarthria   • Dementia without behavioral disturbance (CMS/HCC)   • Hypothyroidism (acquired)   • GERD without esophagitis   • Cardiomyopathy (CMS/HCC)   • Essential hypertension   • Dyslipidemia     Past Medical History:   Diagnosis Date   • Dementia (CMS/HCC)    • Depression    • GERD (gastroesophageal reflux disease)    • Hypothyroid      Past Surgical History:   Procedure Laterality Date   • JOINT REPLACEMENT       General Information     Row Name 21 1437          OT Time and Intention    Document Type  progress note/recertification  -AN     Mode of Treatment  occupational therapy  -AN     Row Name 21 1437          General Information    Patient Profile Reviewed  yes  -AN     Existing Precautions/Restrictions  fall  -AN     Row Name 21 1437          Cognition    Orientation Status (Cognition)  oriented to;person  -AN     Row Name 21 1437          Safety Issues, Functional Mobility    Impairments Affecting Function (Mobility)  endurance/activity tolerance;strength;range of motion (ROM);postural/trunk control;balance;coordination;motor control;cognition;grasp  -AN       User Key  (r) = Recorded By, (t) = Taken By, (c) = Cosigned By    Initials Name Provider Type    AN Rhoda Coker OT Occupational Therapist          Mobility/ADL's     Row Name 21 1442          Bed Mobility    Comment (Bed Mobility)  pt up in chair  -AN     Row Name 21 1442          Grooming Assessment/Training    [place] Level (Grooming)  wash face, hands;moderate assist (50% patient effort)  -AN      Position (Grooming)  supported sitting  -AN     Row Name 04/07/21 1442          Self-Feeding Assessment/Training    [place] Level (Feeding)  liquids to mouth;set up;supervision;feeding skills;moderate assist (50% patient effort)  -AN     Assistive Devices (Feeding)  built-up handle utensils;adapted cup  -AN     Position (Self-Feeding)  supported sitting  -AN     Comment (Feeding)  OT to assist with scooping food 75% of time; min assist to assist pt getting spoon to mouth 50% of time  -AN     Row Name 04/07/21 1442          Toileting Assessment/Training    [place] Level (Toileting)  dependent (less than 25% patient effort)  -AN       User Key  (r) = Recorded By, (t) = Taken By, (c) = Cosigned By    Initials Name Provider Type    Rhoda Palacios, OT Occupational Therapist        Obj/Interventions    No documentation.       Goals/Plan    No documentation.       Clinical Impression     Row Name 04/07/21 1445          Pain Scale: Numbers Pre/Post-Treatment    Pretreatment Pain Rating  0/10 - no pain  -AN     Posttreatment Pain Rating  0/10 - no pain  -AN     Row Name 04/07/21 1445          Plan of Care Review    Plan of Care Reviewed With  patient  -AN     Outcome Summary  OT session focus on feeding this date. Pt requires set up with cup to drink. Pt mod assist overall with feeding skills. Pt required assist with spoon handling, he often hits just below his mouth. Pt needed mod to max assist to place food on spoon. Continue OT and recommend SNF.  -AN     Row Name 04/07/21 1445          Therapy Plan Review/Discharge Plan (OT)    Anticipated Discharge Disposition (OT)  skilled nursing facility  -AN     Row Name 04/07/21 1445          Vital Signs    Pre Systolic BP Rehab  130  -AN     Pre Treatment Diastolic BP  90  -AN     Pretreatment Heart Rate (beats/min)  65  -AN     Posttreatment Heart Rate (beats/min)  58  -AN     Row Name 04/07/21 1445          Positioning and Restraints    Pre-Treatment Position   sitting in chair/recliner  -AN     Post Treatment Position  chair  -AN     In Chair  reclined;call light within reach;encouraged to call for assist;exit alarm on  -AN       User Key  (r) = Recorded By, (t) = Taken By, (c) = Cosigned By    Initials Name Provider Type    Rhoda Palacios OT Occupational Therapist        Outcome Measures     Row Name 04/07/21 1450          How much help from another is currently needed...    Putting on and taking off regular lower body clothing?  1  -AN     Bathing (including washing, rinsing, and drying)  1  -AN     Toileting (which includes using toilet bed pan or urinal)  1  -AN     Putting on and taking off regular upper body clothing  2  -AN     Taking care of personal grooming (such as brushing teeth)  2  -AN     Eating meals  3  -AN     AM-PAC 6 Clicks Score (OT)  10  -AN       User Key  (r) = Recorded By, (t) = Taken By, (c) = Cosigned By    Initials Name Provider Type    Rhoda Palacios OT Occupational Therapist        Occupational Therapy Education                 Title: PT OT SLP Therapies (In Progress)     Topic: Occupational Therapy (In Progress)     Point: ADL training (In Progress)     Description:   Instruct learner(s) on proper safety adaptation and remediation techniques during self care or transfers.   Instruct in proper use of assistive devices.              Learning Progress Summary           Patient Acceptance, E, NR by  at 4/7/2021 1350    Comment: Feeding task retraining.    Acceptance, E,TB,D, VU,NR by  at 4/5/2021 1447    Acceptance, E,TB,D,H, VU,NR by  at 3/30/2021 1546    Nonacceptance, E, NL by  at 3/28/2021 0814    Comment: Educated pt on OT role and POC.                   Point: Home exercise program (Not Started)     Description:   Instruct learner(s) on appropriate technique for monitoring, assisting and/or progressing therapeutic exercises/activities.              Learner Progress:  Not documented in this visit.          Point: Precautions  (Done)     Description:   Instruct learner(s) on prescribed precautions during self-care and functional transfers.              Learning Progress Summary           Patient Acceptance, E,TB,D, VU,NR by  at 4/5/2021 1447    Acceptance, E,TB,D,H, VU,NR by  at 3/30/2021 1546                   Point: Body mechanics (Done)     Description:   Instruct learner(s) on proper positioning and spine alignment during self-care, functional mobility activities and/or exercises.              Learning Progress Summary           Patient Acceptance, E,TB,D, VU,NR by  at 4/5/2021 1447    Acceptance, E,TB,D,H, VU,NR by  at 3/30/2021 1546                               User Key     Initials Effective Dates Name Provider Type Discipline     06/22/15 -  Rhoda Coker, OT Occupational Therapist OT     03/07/18 -  Romelia Nguyen, OT Occupational Therapist OT              OT Recommendation and Plan  Therapy Frequency (OT): daily  Plan of Care Review  Plan of Care Reviewed With: patient  Outcome Summary: OT session focus on feeding this date. Pt requires set up with cup to drink. Pt mod assist overall with feeding skills. Pt required assist with spoon handling, he often hits just below his mouth. Pt needed mod to max assist to place food on spoon. Continue OT and recommend SNF.     Time Calculation:   Time Calculation- OT     Row Name 04/07/21 1453 04/07/21 1410          Time Calculation- OT    OT Start Time  1350  -AN  --     Total Timed Code Minutes- OT  24 minute(s)  -AN  --     OT Received On  04/07/21  -AN  --     OT Goal Re-Cert Due Date  04/17/21  -AN  --        Timed Charges    43602 - Gait Training Minutes   --  10  -CD       User Key  (r) = Recorded By, (t) = Taken By, (c) = Cosigned By    Initials Name Provider Type    Zuly Rodriguez, PT Physical Therapist    Rhoda Palacios, OT Occupational Therapist        Therapy Charges for Today     Code Description Service Date Service Provider Modifiers Qty    [id number]  OT  SELF CARE/MGMT/TRAIN EA 15 MIN 4/7/2021 Rhoda Coker, OT GO 2               Rhoda Coker, PACO  4/7/2021

## 2021-04-07 NOTE — PROGRESS NOTES
[facility] Medicine Services  PROGRESS NOTE    Patient Name: Sebastian Winter  : 1940  MRN: [id number]    Date of Admission: 3/26/2021  Primary Care Physician: Yoav Bee MD    Subjective   Subjective     CC: Follow-up CVA    HPI: No acute events overnight, patient is awake alert this morning that he is doing okay does have some dysarthria, working with speech    ROS:  Gen- No fevers, chills  CV- No chest pain, palpitations  Resp- No cough, dyspnea  GI- No N/V/D, abd pain      Objective   Objective     Vital Signs:   Temp:  [96.1 °F (35.6 °C)-98.2 °F (36.8 °C)] 97.1 °F (36.2 °C)  Heart Rate:  [50-69] 50  Resp:  [16] 16  BP: (102-160)/(64-91) 153/91  Total (NIH Stroke Scale): 4     Physical Exam:  Constitutional: Chronically ill-appearing elderly male, in no acute distress, awake, alert  HENT: NCAT, mucous membranes moist  Respiratory: Clear to auscultation bilaterally, respiratory effort normal   Cardiovascular: RRR, no murmurs, rubs, or gallops  Gastrointestinal: Positive bowel sounds, soft, nontender, nondistended  Musculoskeletal: No bilateral ankle edema  Psychiatric: Appropriate affect, cooperative  Neurologic: Alert, speech is dysarthric  Skin: No rashes    Results Reviewed:  Results from last 7 days   Lab Units 21  0516   WBC 10*3/mm3 7.13   HEMOGLOBIN g/dL 13.3   HEMATOCRIT % 42.3   PLATELETS 10*3/mm3 182     Results from last 7 days   Lab Units 21  0550 21  0856   SODIUM mmol/L 144 143   POTASSIUM mmol/L 3.5 3.7   CHLORIDE mmol/L 104 106   CO2 mmol/L 31.0* 31.0*   BUN mg/dL 13 11   CREATININE mg/dL 1.10 1.05   GLUCOSE mg/dL 87 97   CALCIUM mg/dL 8.9 8.6     Estimated Creatinine Clearance: 45.5 mL/min (by C-G formula based on SCr of 1.1 mg/dL).    Microbiology Results Abnormal     Procedure Component Value - Date/Time    COVID PRE-OP / PRE-PROCEDURE SCREENING ORDER (NO ISOLATION) - Swab, Nasopharynx [[id number]]  (Normal) Collected: 21 5139     Lab Status: Final result Specimen: Swab from Nasopharynx Updated: 03/26/21 1852    Narrative:      The following orders were created for panel order COVID PRE-OP / PRE-PROCEDURE SCREENING ORDER (NO ISOLATION) - Swab, Nasopharynx.  Procedure                               Abnormality         Status                     ---------                               -----------         ------                     COVID-19 and FLU A/B PCR...[[id number]]  Normal              Final result                 Please view results for these tests on the individual orders.    COVID-19 and FLU A/B PCR - Swab, Nasopharynx [[id number]]  (Normal) Collected: 03/26/21 1816    Lab Status: Final result Specimen: Swab from Nasopharynx Updated: 03/26/21 1852     COVID19 Not Detected     Influenza A PCR Not Detected     Influenza B PCR Not Detected    Narrative:      Fact sheet for providers: https://www.fda.gov/media/853648/download    Fact sheet for patients: https://www.fda.gov/media/193172/download    Test performed by PCR.          Imaging Results (Last 24 Hours)     ** No results found for the last 24 hours. **          Results for orders placed during the hospital encounter of 03/26/21    Adult Transthoracic Echo Complete W/ Cont if Necessary Per Protocol (With Agitated Saline)    Interpretation Summary  · Estimated left ventricular EF = 35% Left ventricular systolic function is moderately decreased. Inferoposterior hypokinesis  · Trace to mild mitral valve regurgitation is present.  · Trace tricuspid valve regurgitation is present.  · Saline test results are negative for intracardiac shunting.  · No obvious cardiac source for embolus is seen.      I have reviewed the medications:  Scheduled Meds:aspirin, 325 mg, Oral, Daily   Or  aspirin, 300 mg, Rectal, Daily  atorvastatin, 80 mg, Oral, Nightly  carvedilol, 3.125 mg, Oral, BID With Meals  clonazePAM, 1 mg, Oral, Nightly  clopidogrel, 75 mg, Oral, Daily  donepezil, 5 mg, Oral,  Nightly  enoxaparin, 40 mg, Subcutaneous, Daily  fludrocortisone, 100 mcg, Oral, Daily  levothyroxine, 75 mcg, Oral, Q AM  pantoprazole, 40 mg, Oral, Q AM  sertraline, 50 mg, Oral, Daily  sodium chloride, 10 mL, Intravenous, Q12H      Continuous Infusions:   PRN Meds:.•  acetaminophen  •  acetaminophen  •  clonazePAM  •  magnesium sulfate **OR** magnesium sulfate **OR** magnesium sulfate  •  potassium chloride **OR** potassium chloride **OR** potassium chloride  •  sodium chloride  •  sodium chloride    Assessment/Plan   Assessment & Plan     Active Hospital Problems    Diagnosis  POA   • **Dysarthria [R47.1]  Yes   • Cardiomyopathy (CMS/HCC) [I42.9]  Yes   • Essential hypertension [I10]  Yes   • Dyslipidemia [E78.5]  Yes   • Acute left-sided weakness [R53.1]  Yes   • Dementia without behavioral disturbance (CMS/HCC) [F03.90]  Yes   • Hypothyroidism (acquired) [E03.9]  Yes   • GERD without esophagitis [K21.9]  Yes      Resolved Hospital Problems   No resolved problems to display.        Brief Hospital Course to date:  Sebastian Winter is a 81 y.o. male with recent CVA treated at [facility] with no residual deficit per family, subsequently transitioned to long term care at [facility].   Sent to [facility] ED with acute L side weakness and dysarthria similar to his recent stroke as well as right sided facial droop.      This patient's problems and plans were partially entered by my partner and updated as appropriate by me 04/06/21.     Dysarthria and left sided weakness, acute  HX recent CVA  - imaging (CT head, CT perfusion, CTA head/neck, MRI brain) shows old lacunar infarct in the bud but no acute abnormality per radiology interpretation.  - unclear if this is exac of prev stroke vs new stroke; r/o cardioembolic.  Stroke team has signed off.    - P2Y12 103:  approp inhibition noted  - echo - EF 35, bubble study neg   -Continue ASA, plavix, atorvastatin  -cardiac event monitor at DC: Alex - discussed w   aslam      Diastolic Heart Failure  -EF 35% , grade I,   -cardiology consulted and started carvedilol , signed off  -had LHC at [facility], with some CAD but no LETICIA      Dementia  -Son-in-law reports he normally does well when he gets his nightly clonazepam  -continue clonazepam 1 mg at HS  -continue Aricept     Hypothyroidism  -Continue synthroid     GERD  -Continue PPI     Vitamin B12 deficiency  Vitamin D-deficiency  -On replacement at SNF      DVT Prophylaxis: Lovenox      Disposition: TBD, anticipate discharge to rehab ([facility]),  following      CODE STATUS:   Code Status and Medical Interventions:   Ordered at: 03/26/21 1741     Limited Support to NOT Include:    Antiarrhythmic Drugs    Cardioversion/Defibrillation    Intubation     Code Status:    No CPR     Medical Interventions (Level of Support Prior to Arrest):    Limited       Kirt Lowe MD  04/07/21

## 2021-04-08 VITALS
HEART RATE: 56 BPM | WEIGHT: 137 LBS | SYSTOLIC BLOOD PRESSURE: 166 MMHG | TEMPERATURE: 97.4 F | OXYGEN SATURATION: 97 % | DIASTOLIC BLOOD PRESSURE: 83 MMHG | BODY MASS INDEX: 20.29 KG/M2 | RESPIRATION RATE: 16 BRPM | HEIGHT: 69 IN

## 2021-04-08 PROCEDURE — 25010000002 ENOXAPARIN PER 10 MG: Performed by: INTERNAL MEDICINE

## 2021-04-08 PROCEDURE — 99239 HOSP IP/OBS DSCHRG MGMT >30: CPT | Performed by: INTERNAL MEDICINE

## 2021-04-08 RX ORDER — CARVEDILOL 3.12 MG/1
3.12 TABLET ORAL 2 TIMES DAILY WITH MEALS
Qty: 60 TABLET | Refills: 1 | Status: SHIPPED | OUTPATIENT
Start: 2021-04-08 | End: 2021-05-08

## 2021-04-08 RX ORDER — CLONAZEPAM 1 MG/1
1 TABLET ORAL NIGHTLY
Qty: 3 TABLET | Refills: 0 | Status: SHIPPED | OUTPATIENT
Start: 2021-04-08 | End: 2021-04-15

## 2021-04-08 RX ORDER — DONEPEZIL HYDROCHLORIDE 5 MG/1
5 TABLET, FILM COATED ORAL NIGHTLY
Qty: 30 TABLET | Refills: 1 | Status: SHIPPED | OUTPATIENT
Start: 2021-04-08 | End: 2021-05-08

## 2021-04-08 RX ORDER — CLONAZEPAM 0.5 MG/1
0.5 TABLET ORAL 2 TIMES DAILY PRN
Qty: 6 TABLET | Refills: 0 | Status: SHIPPED | OUTPATIENT
Start: 2021-04-08 | End: 2021-04-15

## 2021-04-08 RX ORDER — ASPIRIN 325 MG
325 TABLET ORAL DAILY
Qty: 30 TABLET | Refills: 1 | Status: SHIPPED | OUTPATIENT
Start: 2021-04-08 | End: 2021-05-08

## 2021-04-08 RX ADMIN — CLOPIDOGREL BISULFATE 75 MG: 75 TABLET ORAL at 08:09

## 2021-04-08 RX ADMIN — LEVOTHYROXINE SODIUM 75 MCG: 75 TABLET ORAL at 06:06

## 2021-04-08 RX ADMIN — SERTRALINE HYDROCHLORIDE 50 MG: 50 TABLET ORAL at 08:09

## 2021-04-08 RX ADMIN — PANTOPRAZOLE SODIUM 40 MG: 40 TABLET, DELAYED RELEASE ORAL at 06:06

## 2021-04-08 RX ADMIN — FLUDROCORTISONE ACETATE 100 MCG: 0.1 TABLET ORAL at 08:17

## 2021-04-08 RX ADMIN — CARVEDILOL 3.12 MG: 3.12 TABLET, FILM COATED ORAL at 08:09

## 2021-04-08 RX ADMIN — ENOXAPARIN SODIUM 40 MG: 40 INJECTION SUBCUTANEOUS at 08:07

## 2021-04-08 RX ADMIN — ASPIRIN 325 MG ORAL TABLET 325 MG: 325 PILL ORAL at 08:09

## 2021-04-08 NOTE — DISCHARGE PLACEMENT REQUEST
"Sebastian Winter (81 y.o. Male)     Date of Birth Social Security Number Address Home Phone MRN    1940  [id number]  [facility] [address] [phone] [id number]    Faith Marital Status          Lutheran        Admission Date Admission Type Admitting Provider Attending Provider Department, Room/Bed    3/26/21 Emergency Kirt Lowe MD Opii, [place], MD [facility] 3F, S319/1    Discharge Date Discharge Disposition Discharge Destination         Skilled Nursing Facility (DC - External)              Attending Provider: Kirt Lowe MD    Allergies: No Known Allergies    Isolation: None   Infection: None   Code Status: No CPR    Ht: 175.3 cm (69\")   Wt: 62.1 kg (137 lb)    Admission Cmt: None   Principal Problem: Dysarthria [R47.1]                 Active Insurance as of 3/26/2021     Primary Coverage     Payor Plan Insurance Group Employer/Plan Group    MEDICARE MEDICARE A & B      Payor Plan Address Payor Plan Phone Number Payor Plan Fax Number Effective Dates    [address] [phone]  1/1/2004 - None Entered    [facility] [address]       Subscriber Name Subscriber Birth Date Member ID       SEBASTIAN WINTER 1940 [id number]           Secondary Coverage     Payor Plan Insurance Group Employer/Plan Group    [employer] MC SUP AAR HEALTH CARE OPTIONS      Payor Plan Address Payor Plan Phone Number Payor Plan Fax Number Effective Dates    [facility] [phone]  1/1/2021 - None Entered    [address]       [facility] [address]       Subscriber Name Subscriber Birth Date Member ID       SEBASTIAN WINTER 1940 [id number]                 Emergency Contacts      (Rel.) Home Phone Work Phone Mobile Phone    Jozef Lock (Daughter) [phone] -- [phone]    AbdullahibinNola ferguson (Daughter) [phone] -- [phone]        COVID PRE-OP / PRE-PROCEDURE SCREENING ORDER (NO ISOLATION) - Swab, Nasopharynx [[id number]] (Order [id number])  Order  Date: " 2021 Department: [facility] 3F Released By: Mariel Garrett RN (auto-released) Authorizing: Kirt Lowe MD   Linked Results    Procedure Abnormality Status   COVID-19, [facility] IN-HOUSE,NASAL Swab (NO TRANSPORT MEDIA) 2 HR TAT - Swab, Nasopharynx Normal Final result   Reprint Order Requisition    COVID-19, [facility] IN-HOUSE,NASAL Swab (NO TRANSPORT MEDIA) 2 HR TAT - Swab, Nasopharynx (Order #[id number]) on 21       COVID-19, [facility]OTT IN-HOUSE,NASAL Swab (NO TRANSPORT MEDIA) 2 HR TAT - Swab, Nasopharynx  Order: [id number] - Part of Panel Order [id number]  Status:  Final result   Visible to patient:  No (not released) Next appt:  None  Specimen Information: Nasopharynx; Swab        Component   Ref Range & Units    COVID19   Presumptive Negative - Ref. Range Presumptive Negative    Resulting Agency [facility]      Narrative  Performed by: [facility]  Fact sheet for providers: https://www.fda.gov/media/188956/download     Fact sheet for patients: https://www.fda.gov/media/372424/download     Test performed by PCR.   If inconsistent with clinical signs and symptoms patient should be tested with different authorized molecular test.      Specimen Collected: 21 21:39 Last Resulted: 21 23:35       Order Details     View Encounter     Lab and Collection Details     Routing     Result History              Result Read / Acknowledged    Acknowledge result  No acknowledgement history exists for this order.   Lab Component SmartPhrase Guide    COVID-19, [facility] IN-HOUSE,NASAL Swab (NO TRANSPORT MEDIA) 2 HR TAT - Swab, Nasopharynx (Order #[id number]) on 21            Discharge Summary      Kirt Lowe MD at 21 0758              [facility] Medicine Services  DISCHARGE SUMMARY    Patient Name: Sebastian Winter  : 1940  MRN: [id number]    Date of Admission: 3/26/2021  2:42 PM  Date of Discharge:  2021  Primary Care Physician: Yoav Bee  MD    Consults     Date and Time Order Name Status Description    3/29/2021 12:16 PM Inpatient Cardiology Consult Completed     3/29/2021 10:02 AM Inpatient Cardiology Consult Completed     3/26/2021  2:42 PM Inpatient Neurology Consult Stroke Completed           Hospital Course     Presenting Problem:   Acute left-sided weakness [R53.1]    Active Hospital Problems    Diagnosis  POA   • **Dysarthria [R47.1]  Yes   • Cardiomyopathy (CMS/HCC) [I42.9]  Yes   • Essential hypertension [I10]  Yes   • Dyslipidemia [E78.5]  Yes   • Acute left-sided weakness [R53.1]  Yes   • Dementia without behavioral disturbance (CMS/HCC) [F03.90]  Yes   • Hypothyroidism (acquired) [E03.9]  Yes   • GERD without esophagitis [K21.9]  Yes      Resolved Hospital Problems   No resolved problems to display.          Hospital Course:  Sebastian Winter is a 81 y.o. male with recent CVA treated at [facility] with no residual deficit per family, subsequently transitioned to long term care at [facility].   Sent to [facility] ED with acute L side weakness and dysarthria similar to his recent stroke as well as right sided facial droop.      Dysarthria and left sided weakness, acute  HX recent CVA  - imaging (CT head, CT perfusion, CTA head/neck, MRI brain) showed old lacunar infarct in the bud but no acute abnormality per radiology interpretation.  - unclear if this is exac of prev stroke vs new stroke; r/o cardioembolic.  Stroke team has signed off.    - P2Y12 103:  approp inhibition noted  - echo - EF 35, bubble study neg   -Continue ASA, plavix, atorvastatin  -cardiac event monitor at DC: Alex - discussed w dr doyle   -He will follow-up with neurology Dr. Moore in 4 weeks     Diastolic Heart Failure  -EF 35% , grade I,   -cardiology consulted and started carvedilol , signed off  -had C at [facility], with some CAD but no LETICIA   -We will follow up with Dr. Doyle in 4 to 6 weeks, will have a telehealth visit with the heart valve Wayan  "in 1 week     Dementia  -Son-in-law reports he normally does well when he gets his nightly clonazepam  -continue clonazepam 1 mg at HS and 0.5mg PRN  -continue Aricept     Hypothyroidism  -Continue synthroid     GERD  -Continue PPI     Vitamin B12 deficiency  Vitamin D-deficiency  -On replacement at SNF       Discharge Follow Up Recommendations for outpatient labs/diagnostics:  Follow-up with cardiology, Dr. Doyle in 4 weeks  Follow-up with neurology in 4 weeks    Day of Discharge     HPI: No acute events overnight, patient said that he slept okay excited about \"going home today\"    Review of Systems  Gen- No fevers, chills  CV- No chest pain, palpitations  Resp- No cough, dyspnea  GI- No N/V/D, abd pain    Vital Signs:   Temp:  [96.3 °F (35.7 °C)-97.9 °F (36.6 °C)] 97.6 °F (36.4 °C)  Heart Rate:  [53-59] 58  Resp:  [16-18] 16  BP: (129-152)/(66-91) 143/78     Physical Exam:  Constitutional: Currently ill-appearing elderly male, in no acute distress, awake, alert  HENT: NCAT, mucous membranes moist  Respiratory: Clear to auscultation bilaterally, respiratory effort normal   Cardiovascular: RRR, no murmurs, rubs, or gallops  Gastrointestinal: Positive bowel sounds, soft, nontender, nondistended  Musculoskeletal: No bilateral ankle edema  Psychiatric: Appropriate affect, cooperative  Neurologic: Slurred speech, mild left sided weakness  Skin: No rashes    Pertinent  and/or Most Recent Results     LAB RESULTS:      Lab 04/03/21  0516   WBC 7.13   HEMOGLOBIN 13.3   HEMATOCRIT 42.3   PLATELETS 182   MCV 91.6         Lab 04/07/21  0550 04/03/21  0856   SODIUM 144 143   POTASSIUM 3.5 3.7   CHLORIDE 104 106   CO2 31.0* 31.0*   ANION GAP 9.0 6.0   BUN 13 11   CREATININE 1.10 1.05   GLUCOSE 87 97   CALCIUM 8.9 8.6   MAGNESIUM  --  2.0                         Brief Urine Lab Results  (Last result in the past 365 days)      Color   Clarity   Blood   Leuk Est   Nitrite   Protein   CREAT   Urine HCG        03/27/21 1259 Yellow " Clear Small (1+) Negative Negative Trace             Microbiology Results (last 10 days)     Procedure Component Value - Date/Time    COVID PRE-OP / PRE-PROCEDURE SCREENING ORDER (NO ISOLATION) - Swab, Nasopharynx [[id number]]  (Normal) Collected: 04/07/21 2139    Lab Status: Final result Specimen: Swab from Nasopharynx Updated: 04/07/21 2335    Narrative:      The following orders were created for panel order COVID PRE-OP / PRE-PROCEDURE SCREENING ORDER (NO ISOLATION) - Swab, Nasopharynx.  Procedure                               Abnormality         Status                     ---------                               -----------         ------                     COVID-19, [facility]OTT IN-HOUS...[[id number]]  Normal              Final result                 Please view results for these tests on the individual orders.    COVID-19, [facility]BBOTT IN-HOUSE,NASAL Swab (NO TRANSPORT MEDIA) 2 HR TAT - Swab, Nasopharynx [[id number]]  (Normal) Collected: 04/07/21 2139    Lab Status: Final result Specimen: Swab from Nasopharynx Updated: 04/07/21 2335     COVID19 Presumptive Negative    Narrative:      Fact sheet for providers: https://www.fda.gov/media/631797/download     Fact sheet for patients: https://www.fda.gov/media/471911/download    Test performed by PCR.  If inconsistent with clinical signs and symptoms patient should be tested with different authorized molecular test.          FL Video Swallow With Speech Single Contrast    Result Date: 3/30/2021  EXAMINATION: FL VIDEO SWALLOW W SPEECH SINGLE-CONTRAST-  INDICATION: DYSPHAGIA, OROPHARYNGEAL, HAS ATTRIBUTABLE CAUSE  TECHNIQUE: 2 minutes and 42 seconds of fluoroscopic time was used for this exam. 10 associated fluoroscopic loops were saved. The patient was evaluated in the seated lateral position while taking a variety of consistencies of barium by mouth under the direction of speech pathology.  COMPARISON: NONE  FINDINGS: 2 minutes and 42 seconds of fluoroscopy provided for a modified  barium swallow. Please see speech therapy report for full details and recommendations.       Fluoroscopy provided for a modified barium swallow. Please see speech therapy report for full details and recommendations.    This report was finalized on 3/30/2021 6:53 PM by Dr. Armand Rangel.                Results for orders placed during the hospital encounter of 03/26/21    Adult Transthoracic Echo Complete W/ Cont if Necessary Per Protocol (With Agitated Saline)    Interpretation Summary  · Estimated left ventricular EF = 35% Left ventricular systolic function is moderately decreased. Inferoposterior hypokinesis  · Trace to mild mitral valve regurgitation is present.  · Trace tricuspid valve regurgitation is present.  · Saline test results are negative for intracardiac shunting.  · No obvious cardiac source for embolus is seen.      Plan for Follow-up of Pending Labs/Results:   Pending Labs     Order Current Status    SARS-CoV-2 Antibody, IgG ([facility]) In process        Discharge Details        Discharge Medications      New Medications      Instructions Start Date   aspirin 325 MG tablet  Replaces: aspirin 81 MG EC tablet   325 mg, Oral, Daily      carvedilol 3.125 MG tablet  Commonly known as: COREG   3.125 mg, Oral, 2 Times Daily With Meals      donepezil 5 MG tablet  Commonly known as: ARICEPT   5 mg, Oral, Nightly         Changes to Medications      Instructions Start Date   clonazePAM 0.5 MG tablet  Commonly known as: KlonoPIN  What changed: reasons to take this   0.5 mg, Oral, 2 Times Daily PRN      clonazePAM 1 MG tablet  Commonly known as: KlonoPIN  What changed:   · when to take this  · reasons to take this   1 mg, Oral, Nightly         Continue These Medications      Instructions Start Date   acetaminophen 325 MG tablet  Commonly known as: TYLENOL   650 mg, Oral, Every 6 Hours PRN      Aplisol 5 UNIT/0.1ML injection  Generic drug: tuberculin   Intradermal, Once      atorvastatin 80 MG tablet  Commonly known  as: LIPITOR   80 mg, Oral, Daily      bisacodyl 5 MG EC tablet  Commonly known as: DULCOLAX   10 mg, Oral, Daily PRN      cholecalciferol 25 MCG (1000 UT) tablet  Commonly known as: VITAMIN D3   1,000 Units, Oral, Daily      clopidogrel 75 MG tablet  Commonly known as: PLAVIX   75 mg, Oral, Daily      fludrocortisone 0.1 MG tablet   Oral, Daily      levothyroxine 75 MCG tablet  Commonly known as: SYNTHROID, LEVOTHROID   75 mcg, Oral, Daily      magnesium hydroxide 400 MG/5ML suspension  Commonly known as: MILK OF MAGNESIA   Oral, Daily PRN      pantoprazole 40 MG EC tablet  Commonly known as: PROTONIX   40 mg, Oral, Daily      sertraline 50 MG tablet  Commonly known as: ZOLOFT   50 mg, Oral, Daily      vitamin B-12 1000 MCG tablet  Commonly known as: CYANOCOBALAMIN   1,000 mcg, Oral, Daily         Stop These Medications    aspirin 81 MG EC tablet  Replaced by: aspirin 325 MG tablet            No Known Allergies      Discharge Disposition: [facility] Facility ([place] - External)    Diet:  Hospital:  Diet Order   Procedures   • Diet Dysphagia; II - Pureed; Nectar / Syrup Thick; Other, No Straws; small sips; Cardiac       Activity: As tolerated      Restrictions or Other Recommendations:  None       CODE STATUS:    Code Status and Medical Interventions:   Ordered at: 03/26/21 3549     Limited Support to NOT Include:    Antiarrhythmic Drugs    Cardioversion/Defibrillation    Intubation     Code Status:    No CPR     Medical Interventions (Level of Support Prior to Arrest):    Limited       No future appointments.      Kirt Lowe MD  04/08/21      Time Spent on Discharge:  I spent  35 minutes on this discharge activity which included: face-to-face encounter with the patient, reviewing the data in the system, coordination of the care with the nursing staff as well as consultants, documentation, and entering orders.          Electronically signed by iKrt Lowe MD at 04/08/21 0875

## 2021-04-08 NOTE — PROGRESS NOTES
Case Management Discharge Note      Final Note: Plan is [facility] Today. Tosin is scheduled for 1300. Tosin will come to the room at pickup patient. Nurse to call report to [phone]. CM faxed the discharge summary and COVID test results.    Provided Post Acute Provider List?: Yes  Post Acute Provider List: Inpatient Rehab  Provided Post Acute Provider Quality & Resource List?: Yes  Post Acute Provider Quality and Resource List: Inpatient Rehab  Delivered To: Support Person  Method of Delivery: Telephone    Selected Continued Care - Admitted Since 3/26/2021     Destination Coordination complete    Service Provider Selected Services Address Phone Fax Patient Preferred    [facility]  Skilled Nursing [address] [place] [phone] --          Durable Medical Equipment    No services have been selected for the patient.              Dialysis/Infusion    No services have been selected for the patient.              Home Medical Care    No services have been selected for the patient.              Therapy    No services have been selected for the patient.              Community Resources    No services have been selected for the patient.                  Transportation Services  [facility]    Final Discharge Disposition Code: 03 - skilled nursing facility (SNF)

## 2021-04-09 LAB — SARS-COV-2 IGG SERPL QL IA: POSITIVE

## 2021-04-15 ENCOUNTER — OFFICE VISIT (OUTPATIENT)
Dept: CARDIOLOGY | Facility: HOSPITAL | Age: 81
End: 2021-04-15

## 2021-04-15 VITALS
SYSTOLIC BLOOD PRESSURE: 148 MMHG | TEMPERATURE: 97.4 F | RESPIRATION RATE: 16 BRPM | BODY MASS INDEX: 20.59 KG/M2 | HEART RATE: 63 BPM | DIASTOLIC BLOOD PRESSURE: 80 MMHG | WEIGHT: 139 LBS | HEIGHT: 69 IN | OXYGEN SATURATION: 97 %

## 2021-04-15 DIAGNOSIS — I42.9 CARDIOMYOPATHY, UNSPECIFIED TYPE (HCC): Primary | ICD-10-CM

## 2021-04-15 DIAGNOSIS — E78.5 DYSLIPIDEMIA: ICD-10-CM

## 2021-04-15 DIAGNOSIS — Z86.73 HISTORY OF CVA (CEREBROVASCULAR ACCIDENT): ICD-10-CM

## 2021-04-15 DIAGNOSIS — I10 ESSENTIAL HYPERTENSION: ICD-10-CM

## 2021-04-15 PROCEDURE — 99441 PR PHYS/QHP TELEPHONE EVALUATION 5-10 MIN: CPT | Performed by: NURSE PRACTITIONER

## 2021-04-15 NOTE — PROGRESS NOTES
You have chosen to receive care through the use of telemedicine. Telemedicine enables health care providers at different locations to provide safe, effective, and convenient care through the use of technology. As with any health care service, there are risks associated with the use of telemedicine, including equipment failure, poor connections, and  issues.    • Do you understand the risks and benefits of telemedicine as I have explained them to you? Yes  • Have your questions regarding telemedicine been answered? Yes  • Do you consent to the use of telemedicine in your medical care today? Yes  Chief Complaint  Establish Care and Cardiomyopathy    Subjective    History of Present Illness {CC  Problem List  Visit  Diagnosis   Encounters  Notes  Medications  Labs  Result Review Imaging  Media :23}       History of Present Illness    81-year-old male with telephone visit today for ongoing evaluation of his chronic systolic heart failure.  He presented to [facility] ED on 3/26/2021 with left-sided weakness and dysarthria.  Patient recently had been treated at [facility] with a CVA, transition to long-term care at [facility].  Upon presentation to ED complaints included acute left-sided weakness and dysarthria.  CT of head, CTA head and neck and MRI of brain showed old lacunar infarct in the bud but no acute abnormality.  Patient underwent an echo with a negative bubble study which showed an EF of 35%.  He was followed by cardiology during his stay.  Carvedilol was initiated.  Had left heart cath at [facility] with some CAD but no LETICIA.  He is currently residing at [facility].  He notes that he is breathing well and is not currently experiencing pedal edema.  Currently wearing extended Holter to rule in or rule out atrial fibrillation.  Objective     Vital Signs:   Vitals:    04/15/21 1049   BP: 148/80   BP Location: Left arm   Patient Position: Sitting  "  Pulse: 63   Resp: 16   Temp: 97.4 °F (36.3 °C)   SpO2: 97%   Weight: 63 kg (139 lb)   Height: 175.3 cm (69\")     Body mass index is 20.53 kg/m².  Physical Exam  Vitals and nursing note reviewed.   Constitutional:       Appearance: Normal appearance.   Pulmonary:      Effort: Pulmonary effort is normal.   Neurological:      Mental Status: He is alert and oriented to person, place, and time.   Psychiatric:         Mood and Affect: Mood normal.         Behavior: Behavior normal.         Thought Content: Thought content normal.              Result Review  Data Reviewed:{ Labs  Result Review  Imaging  Med Tab  Media :23}   Adult Transthoracic Echo Complete W/ Cont if Necessary Per Protocol (With Agitated Saline) (03/27/2021 13:18)               Assessment and Plan {CC Problem List  Visit Diagnosis  ROS  Review (Popup)  Health Maintenance  Quality  BestPractice  Medications  SmartSets  SnapShot Encounters  Media :23}   1. Cardiomyopathy, unspecified type (CMS/HCC)  Continue coreg   2. Essential hypertension  Well-controlled    3. Dyslipidemia  Statin therapy   4. History of CVA  Currently in skilled nursing for rehab      Telephone time 10 minutes  Follow Up {Instructions Charge Capture  Follow-up Communications :23}   Return if symptoms worsen or fail to improve.    Patient was given instructions and counseling regarding his condition or for health maintenance advice. Please see specific information pulled into the AVS if appropriate.  Patient was instructed to call the [facility] with any questions, concerns, or worsening symptoms.    *Please note that portions of this note were completed with a voice recognition program. Efforts were made to edit the dictations, but occasionally words are mistranscribed.    "

## 2021-06-11 ENCOUNTER — OFFICE VISIT (OUTPATIENT)
Dept: CARDIOLOGY | Facility: CLINIC | Age: 81
End: 2021-06-11

## 2021-06-11 VITALS
WEIGHT: 131.6 LBS | SYSTOLIC BLOOD PRESSURE: 121 MMHG | DIASTOLIC BLOOD PRESSURE: 77 MMHG | BODY MASS INDEX: 19.43 KG/M2 | HEART RATE: 60 BPM

## 2021-06-11 DIAGNOSIS — I42.9 CARDIOMYOPATHY, UNSPECIFIED TYPE (HCC): Primary | ICD-10-CM

## 2021-06-11 DIAGNOSIS — E78.5 DYSLIPIDEMIA: ICD-10-CM

## 2021-06-11 DIAGNOSIS — I10 ESSENTIAL HYPERTENSION: ICD-10-CM

## 2021-06-11 PROCEDURE — 99442 PR PHYS/QHP TELEPHONE EVALUATION 11-20 MIN: CPT | Performed by: INTERNAL MEDICINE

## 2021-06-11 RX ORDER — ASPIRIN 325 MG
325 TABLET, DELAYED RELEASE (ENTERIC COATED) ORAL DAILY
COMMUNITY
Start: 2021-06-08

## 2021-06-11 RX ORDER — MICONAZOLE NITRATE 20 MG/G
CREAM TOPICAL
COMMUNITY
Start: 2021-06-08

## 2021-06-11 NOTE — PROGRESS NOTES
[facility]    Encounter Date:2021    Patient ID: Sebastian Winter is a 81 y.o. male.  : 1940     PCP: Dawson Chen MD       Chief Complaint: No chief complaint on file.      PROBLEM LIST:  1. Cardiomyopathy  a. Echocardiogram ([facility]), 2021: EF 40-45%. LV wall thickness mildly increased.  LV cavity size normal.  b. Echocardiogram, 3/27/2021: EF 35%. Inferoposterior hypokinesis.  Saline test results are negative for intracardiac shunting. No obvious cardiac source for embolus is seen.   2. Suspected history of CAD versus angina   3. Essential Hypertension  4. Dyslipidemia  5. CVA  a. Carotid duplex ([facility]), 2021: Normal carotid velocities, no significant stenosis (0-49%)  b. 30d MCOT, 2021: SR. Occasional PACs and PVCs. WCT up to 15 sec. Rare PAT/SVT vs NSVT   6. Dysarthia  7. Acute left-sided weakness  8. Dementia without behavioral disturbance   9. Hypothyroidism   10. GERD without esophagitis     History of Present Illness  Patient has a telephone visit today for a 6 week hospital follow-up with a history of cardiomyopathy and cardiac risk factors. Since last visit, patient has been placed in skilled nursing facility for rehab, his nurse was present for the visit.  The patient states that he is having physical therapy and Occupational Therapy.  He gets up and walks.  He has not had any chest pain, his breathing is comfortable.  No significant edema.  No complaints of dizziness lightheadedness or syncope.  No orthopnea or PND.    No Known Allergies      Current Outpatient Medications:   •  acetaminophen (TYLENOL) 325 MG tablet, Take 650 mg by mouth Every 6 (Six) Hours As Needed for Mild Pain ., Disp: , Rfl:   •  aspirin  MG tablet, Take 325 mg by mouth Daily., Disp: , Rfl:   •  atorvastatin (LIPITOR) 80 MG tablet, Take 80 mg by mouth Daily., Disp: , Rfl:   •  Eliane Antifungal 2 % cream, , Disp: , Rfl:   •  bisacodyl (DULCOLAX) 5 MG EC  tablet, Take 10 mg by mouth Daily As Needed for Constipation., Disp: , Rfl:   •  cholecalciferol (VITAMIN D3) 25 MCG (1000 UT) tablet, Take 1,000 Units by mouth Daily., Disp: , Rfl:   •  clopidogrel (PLAVIX) 75 MG tablet, Take 75 mg by mouth Daily., Disp: , Rfl:   •  fludrocortisone 0.1 MG tablet, Take  by mouth Daily., Disp: , Rfl:   •  levothyroxine (SYNTHROID, LEVOTHROID) 75 MCG tablet, Take 75 mcg by mouth Daily., Disp: , Rfl:   •  magnesium hydroxide (MILK OF MAGNESIA) 400 MG/5ML suspension, Take  by mouth Daily As Needed for Constipation., Disp: , Rfl:   •  pantoprazole (PROTONIX) 40 MG EC tablet, Take 40 mg by mouth Daily., Disp: , Rfl:   •  sertraline (ZOLOFT) 50 MG tablet, Take 50 mg by mouth Daily., Disp: , Rfl:   •  tuberculin (Aplisol) 5 UNIT/0.1ML injection, Inject  into the appropriate area of the skin as directed by provider 1 (One) Time., Disp: , Rfl:   •  vitamin B-12 (CYANOCOBALAMIN) 1000 MCG tablet, Take 1,000 mcg by mouth Daily., Disp: , Rfl:   •  carvedilol (COREG) 3.125 MG tablet, Take 1 tablet by mouth 2 (Two) Times a Day With Meals for 30 days., Disp: 60 tablet, Rfl: 1  •  clonazePAM (KlonoPIN) 0.5 MG tablet, Take 1 tablet by mouth 2 (Two) Times a Day As Needed for Anxiety for up to 3 days., Disp: 6 tablet, Rfl: 0  •  clonazePAM (KlonoPIN) 1 MG tablet, Take 1 tablet by mouth Every Night for 3 days., Disp: 3 tablet, Rfl: 0  •  donepezil (ARICEPT) 5 MG tablet, Take 1 tablet by mouth Every Night for 30 days., Disp: 30 tablet, Rfl: 1    The following portions of the patient's history were reviewed and updated as appropriate: allergies, current medications, past family history, past medical history, past social history, past surgical history and problem list.    ROS  Review of Systems   Constitution: Negative for chills, fever, fatigue, generalized weakness.   Cardiovascular: Pertinent positives in HPI, otherwise negative.  Respiratory: Pertinent positives in HPI, otherwise negative.  HENT:  Negative for ear pain, nosebleeds, and tinnitus.  Gastrointestinal: Negative for abdominal pain, constipation, diarrhea, nausea and vomiting.   Genitourinary: No urinary symptoms.  Musculoskeletal: Negative for muscle cramps.  Neurological: Negative for headaches, loss of balance, numbness, and symptoms of stroke.  Psychiatric: Normal mental status.     All other systems reviewed and are negative.    Objective:     /77   Pulse 60   Wt 59.7 kg (131 lb 9.6 oz)   BMI 19.43 kg/m²        Physical Exam  No physical exam performed secondary to telephone visit.   Vitals reviewed.    Lab Review:   Lab Results   Component Value Date    GLUCOSE 87 04/07/2021    BUN 13 04/07/2021    CREATININE 1.10 04/07/2021    EGFRIFNONA 64 04/07/2021    BCR 11.8 04/07/2021    K 3.5 04/07/2021    CO2 31.0 (H) 04/07/2021    CALCIUM 8.9 04/07/2021    AST 20 03/26/2021    ALT 12 03/26/2021     Lab Results   Component Value Date    CHOL 142 03/27/2021    TRIG 109 03/27/2021    HDL 37 (L) 03/27/2021    LDL 85 03/27/2021      Lab Results   Component Value Date    WBC 7.13 04/03/2021    RBC 4.62 04/03/2021    HGB 13.3 04/03/2021    HCT 42.3 04/03/2021    MCV 91.6 04/03/2021     04/03/2021     Lab Results   Component Value Date    TSH 3.570 03/27/2021     Lab Results   Component Value Date    HGBA1C 5.40 03/27/2021       Procedures       Assessment:      Diagnosis Plan   1. Cardiomyopathy, unspecified type (CMS/HCC)   stable, compensated, no CHF symptoms.   2. Essential hypertension   had orthostatic hypotension and is currently on Florinef, maintaining acceptable/normal blood pressure.   3. Dyslipidemia   well-controlled on current statin therapy.     Plan:   Stable from cardiac standpoint.  Continue current medications.  Continue PT/OT.  FU in 6 MO, sooner as needed.  Thank you for allowing us to participate in the care of your patient.     This patient has consented to a telehealth visit via telephone. The visit was scheduled as a  telephone visit to comply with patient safety concerns in accordance with CDC recommendations.  All vitals recorded within this visit are reported by the patient.  I spent 15 minutes in total including but not limited to the 10 minutes spent in direct conversation with this patient.       Jenn Doyle MD, [facility], [facility]      Please note that portions of this note may have been completed with a voice recognition program. Efforts were made to edit the dictations, but occasionally words are mistranscribed.